# Patient Record
Sex: MALE | Race: WHITE | Employment: OTHER | URBAN - METROPOLITAN AREA
[De-identification: names, ages, dates, MRNs, and addresses within clinical notes are randomized per-mention and may not be internally consistent; named-entity substitution may affect disease eponyms.]

---

## 2017-01-01 ENCOUNTER — CARE COORDINATION (OUTPATIENT)
Dept: CARE COORDINATION | Facility: CLINIC | Age: 82
End: 2017-01-01

## 2017-01-01 ENCOUNTER — TELEPHONE (OUTPATIENT)
Dept: FAMILY MEDICINE | Facility: CLINIC | Age: 82
End: 2017-01-01

## 2017-01-01 ENCOUNTER — APPOINTMENT (OUTPATIENT)
Dept: CT IMAGING | Facility: CLINIC | Age: 82
DRG: 291 | End: 2017-01-01
Attending: EMERGENCY MEDICINE
Payer: MEDICARE

## 2017-01-01 ENCOUNTER — APPOINTMENT (OUTPATIENT)
Dept: PHYSICAL THERAPY | Facility: CLINIC | Age: 82
DRG: 699 | End: 2017-01-01
Payer: MEDICARE

## 2017-01-01 ENCOUNTER — APPOINTMENT (OUTPATIENT)
Dept: CARDIOLOGY | Facility: CLINIC | Age: 82
DRG: 291 | End: 2017-01-01
Attending: INTERNAL MEDICINE
Payer: MEDICARE

## 2017-01-01 ENCOUNTER — APPOINTMENT (OUTPATIENT)
Dept: GENERAL RADIOLOGY | Facility: CLINIC | Age: 82
DRG: 872 | End: 2017-01-01
Attending: EMERGENCY MEDICINE
Payer: MEDICARE

## 2017-01-01 ENCOUNTER — MEDICAL CORRESPONDENCE (OUTPATIENT)
Dept: HEALTH INFORMATION MANAGEMENT | Facility: CLINIC | Age: 82
End: 2017-01-01

## 2017-01-01 ENCOUNTER — OFFICE VISIT (OUTPATIENT)
Dept: FAMILY MEDICINE | Facility: CLINIC | Age: 82
End: 2017-01-01
Payer: MEDICARE

## 2017-01-01 ENCOUNTER — NURSING HOME VISIT (OUTPATIENT)
Dept: GERIATRICS | Facility: CLINIC | Age: 82
End: 2017-01-01
Payer: MEDICARE

## 2017-01-01 ENCOUNTER — TRANSFERRED RECORDS (OUTPATIENT)
Dept: HEALTH INFORMATION MANAGEMENT | Facility: CLINIC | Age: 82
End: 2017-01-01

## 2017-01-01 ENCOUNTER — APPOINTMENT (OUTPATIENT)
Dept: PHYSICAL THERAPY | Facility: CLINIC | Age: 82
DRG: 699 | End: 2017-01-01
Attending: INTERNAL MEDICINE
Payer: MEDICARE

## 2017-01-01 ENCOUNTER — HOSPITAL ENCOUNTER (INPATIENT)
Facility: CLINIC | Age: 82
LOS: 4 days | Discharge: HOSPICE | DRG: 291 | End: 2017-04-06
Attending: EMERGENCY MEDICINE | Admitting: INTERNAL MEDICINE
Payer: MEDICARE

## 2017-01-01 ENCOUNTER — DOCUMENTATION ONLY (OUTPATIENT)
Dept: CARE COORDINATION | Facility: CLINIC | Age: 82
End: 2017-01-01

## 2017-01-01 ENCOUNTER — APPOINTMENT (OUTPATIENT)
Dept: PHYSICAL THERAPY | Facility: CLINIC | Age: 82
DRG: 872 | End: 2017-01-01
Attending: INTERNAL MEDICINE
Payer: MEDICARE

## 2017-01-01 ENCOUNTER — APPOINTMENT (OUTPATIENT)
Dept: ULTRASOUND IMAGING | Facility: CLINIC | Age: 82
DRG: 291 | End: 2017-01-01
Attending: EMERGENCY MEDICINE
Payer: MEDICARE

## 2017-01-01 ENCOUNTER — HOSPITAL ENCOUNTER (INPATIENT)
Facility: CLINIC | Age: 82
LOS: 3 days | Discharge: NURSING, REHAB OR LONG-TERM CARE | DRG: 699 | End: 2017-01-05
Attending: EMERGENCY MEDICINE | Admitting: INTERNAL MEDICINE
Payer: MEDICARE

## 2017-01-01 ENCOUNTER — DISCHARGE SUMMARY NURSING HOME (OUTPATIENT)
Dept: GERIATRICS | Facility: CLINIC | Age: 82
End: 2017-01-01
Payer: MEDICARE

## 2017-01-01 ENCOUNTER — TELEPHONE (OUTPATIENT)
Dept: FAMILY MEDICINE | Facility: CLINIC | Age: 82
End: 2017-01-01
Payer: MEDICARE

## 2017-01-01 ENCOUNTER — APPOINTMENT (OUTPATIENT)
Dept: PHYSICAL THERAPY | Facility: CLINIC | Age: 82
DRG: 872 | End: 2017-01-01
Payer: MEDICARE

## 2017-01-01 ENCOUNTER — TELEPHONE (OUTPATIENT)
Dept: NURSING | Facility: CLINIC | Age: 82
End: 2017-01-01

## 2017-01-01 ENCOUNTER — APPOINTMENT (OUTPATIENT)
Dept: GENERAL RADIOLOGY | Facility: CLINIC | Age: 82
DRG: 291 | End: 2017-01-01
Attending: EMERGENCY MEDICINE
Payer: MEDICARE

## 2017-01-01 ENCOUNTER — HOSPITAL ENCOUNTER (INPATIENT)
Facility: CLINIC | Age: 82
LOS: 3 days | Discharge: HOME HEALTH | DRG: 872 | End: 2017-02-02
Attending: EMERGENCY MEDICINE | Admitting: INTERNAL MEDICINE
Payer: MEDICARE

## 2017-01-01 VITALS
HEART RATE: 83 BPM | DIASTOLIC BLOOD PRESSURE: 81 MMHG | RESPIRATION RATE: 16 BRPM | OXYGEN SATURATION: 95 % | WEIGHT: 153.4 LBS | SYSTOLIC BLOOD PRESSURE: 154 MMHG | TEMPERATURE: 98 F | BODY MASS INDEX: 25.53 KG/M2

## 2017-01-01 VITALS
DIASTOLIC BLOOD PRESSURE: 56 MMHG | BODY MASS INDEX: 25.77 KG/M2 | HEART RATE: 69 BPM | HEIGHT: 67 IN | SYSTOLIC BLOOD PRESSURE: 132 MMHG | RESPIRATION RATE: 18 BRPM | OXYGEN SATURATION: 95 % | TEMPERATURE: 98.5 F | WEIGHT: 164.2 LBS

## 2017-01-01 VITALS
BODY MASS INDEX: 24.09 KG/M2 | TEMPERATURE: 99.2 F | OXYGEN SATURATION: 91 % | RESPIRATION RATE: 18 BRPM | HEART RATE: 63 BPM | SYSTOLIC BLOOD PRESSURE: 151 MMHG | DIASTOLIC BLOOD PRESSURE: 95 MMHG | WEIGHT: 149.25 LBS

## 2017-01-01 VITALS
SYSTOLIC BLOOD PRESSURE: 142 MMHG | TEMPERATURE: 97.9 F | RESPIRATION RATE: 16 BRPM | HEART RATE: 69 BPM | BODY MASS INDEX: 27.01 KG/M2 | HEIGHT: 65 IN | DIASTOLIC BLOOD PRESSURE: 72 MMHG | WEIGHT: 162.1 LBS | OXYGEN SATURATION: 96 %

## 2017-01-01 VITALS
RESPIRATION RATE: 24 BRPM | HEART RATE: 72 BPM | OXYGEN SATURATION: 98 % | BODY MASS INDEX: 26.84 KG/M2 | SYSTOLIC BLOOD PRESSURE: 86 MMHG | HEIGHT: 65 IN | WEIGHT: 161.1 LBS | TEMPERATURE: 99.3 F | DIASTOLIC BLOOD PRESSURE: 51 MMHG

## 2017-01-01 VITALS
HEART RATE: 66 BPM | DIASTOLIC BLOOD PRESSURE: 66 MMHG | SYSTOLIC BLOOD PRESSURE: 126 MMHG | HEIGHT: 66 IN | WEIGHT: 156 LBS | RESPIRATION RATE: 14 BRPM | BODY MASS INDEX: 25.07 KG/M2 | TEMPERATURE: 97.6 F

## 2017-01-01 VITALS
SYSTOLIC BLOOD PRESSURE: 124 MMHG | BODY MASS INDEX: 26.49 KG/M2 | TEMPERATURE: 98 F | HEIGHT: 65 IN | RESPIRATION RATE: 18 BRPM | DIASTOLIC BLOOD PRESSURE: 78 MMHG | WEIGHT: 159 LBS | HEART RATE: 68 BPM

## 2017-01-01 VITALS
BODY MASS INDEX: 26.69 KG/M2 | RESPIRATION RATE: 16 BRPM | HEART RATE: 68 BPM | SYSTOLIC BLOOD PRESSURE: 114 MMHG | WEIGHT: 160.4 LBS | TEMPERATURE: 100.9 F | DIASTOLIC BLOOD PRESSURE: 57 MMHG | OXYGEN SATURATION: 99 %

## 2017-01-01 VITALS
HEIGHT: 65 IN | WEIGHT: 161.1 LBS | RESPIRATION RATE: 18 BRPM | DIASTOLIC BLOOD PRESSURE: 62 MMHG | TEMPERATURE: 98.7 F | BODY MASS INDEX: 26.84 KG/M2 | HEART RATE: 73 BPM | SYSTOLIC BLOOD PRESSURE: 122 MMHG | OXYGEN SATURATION: 97 %

## 2017-01-01 DIAGNOSIS — C67.9 MALIGNANT NEOPLASM OF URINARY BLADDER, UNSPECIFIED SITE (H): ICD-10-CM

## 2017-01-01 DIAGNOSIS — N39.0 URINARY TRACT INFECTION, SITE UNSPECIFIED: ICD-10-CM

## 2017-01-01 DIAGNOSIS — N39.0 URINARY TRACT INFECTION WITHOUT HEMATURIA, SITE UNSPECIFIED: Primary | ICD-10-CM

## 2017-01-01 DIAGNOSIS — F33.1 MODERATE EPISODE OF RECURRENT MAJOR DEPRESSIVE DISORDER (H): Primary | ICD-10-CM

## 2017-01-01 DIAGNOSIS — Z51.5 HOSPICE CARE PATIENT: Primary | ICD-10-CM

## 2017-01-01 DIAGNOSIS — I48.0 PAROXYSMAL A-FIB (H): ICD-10-CM

## 2017-01-01 DIAGNOSIS — M62.81 GENERALIZED MUSCLE WEAKNESS: ICD-10-CM

## 2017-01-01 DIAGNOSIS — I50.9 ACUTE CONGESTIVE HEART FAILURE, UNSPECIFIED CONGESTIVE HEART FAILURE TYPE: ICD-10-CM

## 2017-01-01 DIAGNOSIS — A41.9 SEVERE SEPSIS (H): ICD-10-CM

## 2017-01-01 DIAGNOSIS — R35.0 URINE FREQUENCY: Primary | ICD-10-CM

## 2017-01-01 DIAGNOSIS — I10 ESSENTIAL HYPERTENSION WITH GOAL BLOOD PRESSURE LESS THAN 140/90: ICD-10-CM

## 2017-01-01 DIAGNOSIS — N39.0 URINARY TRACT INFECTION DUE TO PROTEUS: ICD-10-CM

## 2017-01-01 DIAGNOSIS — R50.9 FEVER, UNSPECIFIED: ICD-10-CM

## 2017-01-01 DIAGNOSIS — D64.81 ANEMIA DUE TO ANTINEOPLASTIC CHEMOTHERAPY: ICD-10-CM

## 2017-01-01 DIAGNOSIS — D61.818 PANCYTOPENIA (H): ICD-10-CM

## 2017-01-01 DIAGNOSIS — D63.8 ANEMIA IN OTHER CHRONIC DISEASES CLASSIFIED ELSEWHERE: ICD-10-CM

## 2017-01-01 DIAGNOSIS — N19 RENAL FAILURE: ICD-10-CM

## 2017-01-01 DIAGNOSIS — R65.20 SEVERE SEPSIS (H): ICD-10-CM

## 2017-01-01 DIAGNOSIS — N39.0 URINARY TRACT INFECTION, SITE UNSPECIFIED: Primary | ICD-10-CM

## 2017-01-01 DIAGNOSIS — K92.1 MELENA: Primary | ICD-10-CM

## 2017-01-01 DIAGNOSIS — C67.8 MALIGNANT NEOPLASM OF OVERLAPPING SITES OF BLADDER (H): ICD-10-CM

## 2017-01-01 DIAGNOSIS — D63.8 ANEMIA IN OTHER CHRONIC DISEASES CLASSIFIED ELSEWHERE: Primary | ICD-10-CM

## 2017-01-01 DIAGNOSIS — D69.6 THROMBOCYTOPENIA (H): ICD-10-CM

## 2017-01-01 DIAGNOSIS — C67.8 MALIGNANT NEOPLASM OF OVERLAPPING SITES OF BLADDER (H): Primary | ICD-10-CM

## 2017-01-01 DIAGNOSIS — I63.9 CEREBROVASCULAR ACCIDENT (CVA), UNSPECIFIED MECHANISM (H): ICD-10-CM

## 2017-01-01 DIAGNOSIS — N18.30 CKD (CHRONIC KIDNEY DISEASE) STAGE 3, GFR 30-59 ML/MIN (H): ICD-10-CM

## 2017-01-01 DIAGNOSIS — Z85.51 PERSONAL HISTORY OF MALIGNANT NEOPLASM OF BLADDER: Primary | ICD-10-CM

## 2017-01-01 DIAGNOSIS — N30.90 BLADDER INFECTION: ICD-10-CM

## 2017-01-01 DIAGNOSIS — T45.1X5A ANEMIA DUE TO ANTINEOPLASTIC CHEMOTHERAPY: ICD-10-CM

## 2017-01-01 DIAGNOSIS — Z53.9 DIAGNOSIS NOT YET DEFINED: Primary | ICD-10-CM

## 2017-01-01 DIAGNOSIS — N39.0 URINARY TRACT INFECTION WITHOUT HEMATURIA, SITE UNSPECIFIED: ICD-10-CM

## 2017-01-01 DIAGNOSIS — F33.1 MAJOR DEPRESSIVE DISORDER, RECURRENT EPISODE, MODERATE (H): Primary | ICD-10-CM

## 2017-01-01 DIAGNOSIS — D50.0 IRON DEFICIENCY ANEMIA DUE TO CHRONIC BLOOD LOSS: Primary | ICD-10-CM

## 2017-01-01 DIAGNOSIS — Z53.9 ERRONEOUS ENCOUNTER--DISREGARD: Primary | ICD-10-CM

## 2017-01-01 DIAGNOSIS — I48.91 ATRIAL FIBRILLATION WITH RVR (H): ICD-10-CM

## 2017-01-01 DIAGNOSIS — K59.09 CHRONIC CONSTIPATION: ICD-10-CM

## 2017-01-01 DIAGNOSIS — I73.9 PVD (PERIPHERAL VASCULAR DISEASE) (H): ICD-10-CM

## 2017-01-01 DIAGNOSIS — Z86.19 H/O SEPSIS: Primary | ICD-10-CM

## 2017-01-01 DIAGNOSIS — R50.9 ELEVATED TEMPERATURE: ICD-10-CM

## 2017-01-01 DIAGNOSIS — B96.4 URINARY TRACT INFECTION DUE TO PROTEUS: ICD-10-CM

## 2017-01-01 DIAGNOSIS — E11.00 TYPE 2 DIABETES MELLITUS WITH HYPEROSMOLARITY WITHOUT COMA, WITHOUT LONG-TERM CURRENT USE OF INSULIN (H): Primary | ICD-10-CM

## 2017-01-01 DIAGNOSIS — D64.9 ANEMIA, UNSPECIFIED TYPE: ICD-10-CM

## 2017-01-01 DIAGNOSIS — R79.89 ELEVATED LIVER FUNCTION TESTS: ICD-10-CM

## 2017-01-01 DIAGNOSIS — K59.00 CONSTIPATION, UNSPECIFIED CONSTIPATION TYPE: ICD-10-CM

## 2017-01-01 LAB
ABO + RH BLD: NORMAL
ALBUMIN SERPL-MCNC: 2.2 G/DL (ref 3.4–5)
ALBUMIN SERPL-MCNC: 2.4 G/DL (ref 3.4–5)
ALBUMIN SERPL-MCNC: 2.7 G/DL (ref 3.4–5)
ALBUMIN UR-MCNC: 100 MG/DL
ALBUMIN UR-MCNC: >=300 MG/DL
ALBUMIN UR-MCNC: >=300 MG/DL
ALP SERPL-CCNC: 108 U/L (ref 40–150)
ALP SERPL-CCNC: 78 U/L (ref 40–150)
ALP SERPL-CCNC: 97 U/L (ref 40–150)
ALT SERPL W P-5'-P-CCNC: 334 U/L (ref 0–70)
ALT SERPL W P-5'-P-CCNC: 381 U/L (ref 0–70)
ALT SERPL W P-5'-P-CCNC: 39 U/L (ref 0–70)
ANION GAP SERPL CALCULATED.3IONS-SCNC: 10 MMOL/L (ref 3–14)
ANION GAP SERPL CALCULATED.3IONS-SCNC: 10 MMOL/L (ref 3–14)
ANION GAP SERPL CALCULATED.3IONS-SCNC: 11 MMOL/L (ref 3–14)
ANION GAP SERPL CALCULATED.3IONS-SCNC: 11 MMOL/L (ref 3–14)
ANION GAP SERPL CALCULATED.3IONS-SCNC: 7 MMOL/L (ref 3–14)
ANION GAP SERPL CALCULATED.3IONS-SCNC: 8 MMOL/L (ref 3–14)
ANION GAP SERPL CALCULATED.3IONS-SCNC: 8 MMOL/L (ref 3–14)
ANION GAP SERPL CALCULATED.3IONS-SCNC: 9 MMOL/L (ref 3–14)
ANION GAP SERPL CALCULATED.3IONS-SCNC: 9 MMOL/L (ref 3–14)
APPEARANCE UR: ABNORMAL
APPEARANCE UR: ABNORMAL
APPEARANCE UR: CLEAR
AST SERPL W P-5'-P-CCNC: 27 U/L (ref 0–45)
AST SERPL W P-5'-P-CCNC: 508 U/L (ref 0–45)
AST SERPL W P-5'-P-CCNC: 940 U/L (ref 0–45)
BACTERIA #/AREA URNS HPF: ABNORMAL /HPF
BACTERIA SPEC CULT: ABNORMAL
BACTERIA SPEC CULT: ABNORMAL
BACTERIA SPEC CULT: NO GROWTH
BASOPHILS # BLD AUTO: 0 10E9/L (ref 0–0.2)
BASOPHILS NFR BLD AUTO: 0 %
BASOPHILS NFR BLD AUTO: 0.1 %
BASOPHILS NFR BLD AUTO: 0.2 %
BILIRUB DIRECT SERPL-MCNC: 0.2 MG/DL (ref 0–0.2)
BILIRUB SERPL-MCNC: 0.4 MG/DL (ref 0.2–1.3)
BILIRUB SERPL-MCNC: 0.5 MG/DL (ref 0.2–1.3)
BILIRUB SERPL-MCNC: 0.5 MG/DL (ref 0.2–1.3)
BILIRUB UR QL STRIP: NEGATIVE
BLD GP AB SCN SERPL QL: NORMAL
BLD PROD TYP BPU: NORMAL
BLD PROD TYP BPU: NORMAL
BLD UNIT ID BPU: 0
BLOOD BANK CMNT PATIENT-IMP: NORMAL
BLOOD PRODUCT CODE: NORMAL
BPU ID: NORMAL
BUN SERPL-MCNC: 17 MG/DL (ref 7–30)
BUN SERPL-MCNC: 23 MG/DL (ref 7–30)
BUN SERPL-MCNC: 23 MG/DL (ref 9–26)
BUN SERPL-MCNC: 24 MG/DL (ref 7–30)
BUN SERPL-MCNC: 29 MG/DL (ref 7–30)
BUN SERPL-MCNC: 30 MG/DL (ref 7–30)
BUN SERPL-MCNC: 32 MG/DL (ref 7–30)
BUN SERPL-MCNC: 33 MG/DL (ref 7–30)
C DIFF TOX B STL QL: NORMAL
CALCIUM SERPL-MCNC: 8 MG/DL (ref 8.5–10.1)
CALCIUM SERPL-MCNC: 8.3 MG/DL (ref 8.5–10.1)
CALCIUM SERPL-MCNC: 8.5 MG/DL (ref 8.5–10.1)
CALCIUM SERPL-MCNC: 8.8 MG/DL (ref 8.5–10.1)
CALCIUM SERPL-MCNC: 8.8 MG/DL (ref 8.5–10.1)
CALCIUM SERPL-MCNC: 8.9 MG/DL (ref 8.5–10.1)
CALCIUM SERPL-MCNC: 9.1 MG/DL (ref 8.4–10.2)
CALCIUM SERPL-MCNC: 9.1 MG/DL (ref 8.5–10.1)
CHLORIDE SERPL-SCNC: 100 MMOL/L (ref 94–109)
CHLORIDE SERPL-SCNC: 101 MMOL/L (ref 94–109)
CHLORIDE SERPL-SCNC: 104 MMOL/L (ref 94–109)
CHLORIDE SERPL-SCNC: 107 MMOL/L (ref 94–109)
CHLORIDE SERPL-SCNC: 107 MMOL/L (ref 94–109)
CHLORIDE SERPL-SCNC: 108 MMOL/L (ref 94–109)
CHLORIDE SERPL-SCNC: 109 MMOL/L (ref 94–109)
CHLORIDE SERPLBLD-SCNC: 105 MMOL/L (ref 98–109)
CK SERPL-CCNC: 31 U/L (ref 30–300)
CO2 BLDCOV-SCNC: 24 MMOL/L (ref 21–28)
CO2 SERPL-SCNC: 23 MMOL/L (ref 20–32)
CO2 SERPL-SCNC: 23 MMOL/L (ref 20–32)
CO2 SERPL-SCNC: 24 MMOL/L (ref 20–32)
CO2 SERPL-SCNC: 25 MMOL/L (ref 20–32)
CO2 SERPL-SCNC: 28 MMOL/L (ref 20–32)
CO2 SERPL-SCNC: 29 MMOL/L (ref 22–31)
CO2 SERPL-SCNC: 30 MMOL/L (ref 20–32)
CO2 SERPL-SCNC: 32 MMOL/L (ref 20–32)
COLOR UR AUTO: YELLOW
CREAT SERPL-MCNC: 1.18 MG/DL (ref 0.66–1.25)
CREAT SERPL-MCNC: 1.31 MG/DL (ref 0.66–1.25)
CREAT SERPL-MCNC: 1.37 MG/DL (ref 0.66–1.25)
CREAT SERPL-MCNC: 1.41 MG/DL (ref 0.73–1.18)
CREAT SERPL-MCNC: 1.45 MG/DL (ref 0.66–1.25)
CREAT SERPL-MCNC: 1.49 MG/DL (ref 0.66–1.25)
CREAT SERPL-MCNC: 1.56 MG/DL (ref 0.66–1.25)
CREAT SERPL-MCNC: 1.68 MG/DL (ref 0.66–1.25)
CREAT SERPL-MCNC: 1.94 MG/DL (ref 0.66–1.25)
CREAT SERPL-MCNC: 1.99 MG/DL (ref 0.66–1.25)
CREAT SERPL-MCNC: 2.05 MG/DL (ref 0.66–1.25)
DIFFERENTIAL METHOD BLD: ABNORMAL
EOSINOPHIL # BLD AUTO: 0 10E9/L (ref 0–0.7)
EOSINOPHIL # BLD AUTO: 0.1 10E9/L (ref 0–0.7)
EOSINOPHIL # BLD AUTO: 0.3 10E9/L (ref 0–0.7)
EOSINOPHIL # BLD AUTO: 0.4 10E9/L (ref 0–0.7)
EOSINOPHIL # BLD AUTO: 0.5 10E9/L (ref 0–0.7)
EOSINOPHIL NFR BLD AUTO: 0 %
EOSINOPHIL NFR BLD AUTO: 1.1 %
EOSINOPHIL NFR BLD AUTO: 2.5 %
EOSINOPHIL NFR BLD AUTO: 4.3 %
EOSINOPHIL NFR BLD AUTO: 5.5 %
ERYTHROCYTE [DISTWIDTH] IN BLOOD BY AUTOMATED COUNT: 14.5 % (ref 10–15)
ERYTHROCYTE [DISTWIDTH] IN BLOOD BY AUTOMATED COUNT: 14.5 % (ref 10–15)
ERYTHROCYTE [DISTWIDTH] IN BLOOD BY AUTOMATED COUNT: 14.8 % (ref 11–15)
ERYTHROCYTE [DISTWIDTH] IN BLOOD BY AUTOMATED COUNT: 15.3 % (ref 10–15)
ERYTHROCYTE [DISTWIDTH] IN BLOOD BY AUTOMATED COUNT: 15.5 % (ref 10–15)
ERYTHROCYTE [DISTWIDTH] IN BLOOD BY AUTOMATED COUNT: 16.2 % (ref 10–15)
ERYTHROCYTE [DISTWIDTH] IN BLOOD BY AUTOMATED COUNT: 16.3 % (ref 10–15)
ERYTHROCYTE [DISTWIDTH] IN BLOOD BY AUTOMATED COUNT: 16.3 % (ref 10–15)
FERRITIN SERPL-MCNC: 1348 NG/ML (ref 26–388)
FERRITIN SERPL-MCNC: 532 NG/ML (ref 26–388)
FLUAV+FLUBV AG SPEC QL: NEGATIVE
FLUAV+FLUBV AG SPEC QL: NORMAL
FOLATE SERPL-MCNC: 35.9 NG/ML
GFR SERPL CREATININE-BSD FRML MDRD: 31 ML/MIN/1.7M2
GFR SERPL CREATININE-BSD FRML MDRD: 32 ML/MIN/1.7M2
GFR SERPL CREATININE-BSD FRML MDRD: 33 ML/MIN/1.7M2
GFR SERPL CREATININE-BSD FRML MDRD: 39 ML/MIN/1.7M2
GFR SERPL CREATININE-BSD FRML MDRD: 43 ML/MIN/1.7M2
GFR SERPL CREATININE-BSD FRML MDRD: 45 ML/MIN/1.7M2
GFR SERPL CREATININE-BSD FRML MDRD: 46 ML/MIN/1.73M2
GFR SERPL CREATININE-BSD FRML MDRD: 46 ML/MIN/1.7M2
GFR SERPL CREATININE-BSD FRML MDRD: 50 ML/MIN/1.7M2
GFR SERPL CREATININE-BSD FRML MDRD: 52 ML/MIN/1.7M2
GFR SERPL CREATININE-BSD FRML MDRD: 59 ML/MIN/1.7M2
GLUCOSE BLDC GLUCOMTR-MCNC: 118 MG/DL (ref 70–99)
GLUCOSE BLDC GLUCOMTR-MCNC: 120 MG/DL (ref 70–99)
GLUCOSE BLDC GLUCOMTR-MCNC: 120 MG/DL (ref 70–99)
GLUCOSE BLDC GLUCOMTR-MCNC: 121 MG/DL (ref 70–99)
GLUCOSE BLDC GLUCOMTR-MCNC: 126 MG/DL (ref 70–99)
GLUCOSE BLDC GLUCOMTR-MCNC: 129 MG/DL (ref 70–99)
GLUCOSE BLDC GLUCOMTR-MCNC: 140 MG/DL (ref 70–99)
GLUCOSE BLDC GLUCOMTR-MCNC: 140 MG/DL (ref 70–99)
GLUCOSE BLDC GLUCOMTR-MCNC: 155 MG/DL (ref 70–99)
GLUCOSE BLDC GLUCOMTR-MCNC: 165 MG/DL (ref 70–99)
GLUCOSE BLDC GLUCOMTR-MCNC: 167 MG/DL (ref 70–99)
GLUCOSE BLDC GLUCOMTR-MCNC: 168 MG/DL (ref 70–99)
GLUCOSE SERPL-MCNC: 109 MG/DL (ref 70–99)
GLUCOSE SERPL-MCNC: 117 MG/DL (ref 70–99)
GLUCOSE SERPL-MCNC: 118 MG/DL (ref 70–99)
GLUCOSE SERPL-MCNC: 119 MG/DL (ref 70–100)
GLUCOSE SERPL-MCNC: 120 MG/DL (ref 70–99)
GLUCOSE SERPL-MCNC: 142 MG/DL (ref 70–99)
GLUCOSE SERPL-MCNC: 192 MG/DL (ref 70–99)
GLUCOSE SERPL-MCNC: 200 MG/DL (ref 70–99)
GLUCOSE SERPL-MCNC: 237 MG/DL (ref 70–99)
GLUCOSE SERPL-MCNC: 98 MG/DL (ref 70–99)
GLUCOSE UR STRIP-MCNC: NEGATIVE MG/DL
HBA1C MFR BLD: 6.8 % (ref 4.3–6)
HCT VFR BLD AUTO: 23.9 % (ref 40–53)
HCT VFR BLD AUTO: 24.6 % (ref 40–53)
HCT VFR BLD AUTO: 26.9 % (ref 40–53)
HCT VFR BLD AUTO: 26.9 % (ref 40–53)
HCT VFR BLD AUTO: 27 % (ref 40–53)
HCT VFR BLD AUTO: 27.4 % (ref 40–53)
HCT VFR BLD AUTO: 28.4 % (ref 40–53)
HCT VFR BLD AUTO: 28.5 % (ref 40–53)
HCT VFR BLD AUTO: 29.6 % (ref 39–51)
HCT VFR BLD AUTO: 30.4 % (ref 40–53)
HEMOGLOBIN: 9.1 G/DL (ref 13.4–17.5)
HGB BLD-MCNC: 7.4 G/DL (ref 13.3–17.7)
HGB BLD-MCNC: 7.5 G/DL (ref 13.3–17.7)
HGB BLD-MCNC: 7.6 G/DL (ref 13.3–17.7)
HGB BLD-MCNC: 7.8 G/DL (ref 13.3–17.7)
HGB BLD-MCNC: 8.7 G/DL (ref 13.3–17.7)
HGB BLD-MCNC: 8.8 G/DL (ref 13.3–17.7)
HGB BLD-MCNC: 8.8 G/DL (ref 13.3–17.7)
HGB BLD-MCNC: 9 G/DL (ref 13.3–17.7)
HGB BLD-MCNC: 9.1 G/DL (ref 13.3–17.7)
HGB BLD-MCNC: 9.3 G/DL (ref 13.3–17.7)
HGB BLD-MCNC: 9.8 G/DL (ref 13.3–17.7)
HGB UR QL STRIP: ABNORMAL
IMM GRANULOCYTES # BLD: 0 10E9/L (ref 0–0.4)
IMM GRANULOCYTES NFR BLD: 0.2 %
IMM GRANULOCYTES NFR BLD: 0.3 %
IMM GRANULOCYTES NFR BLD: 0.5 %
IRON SATN MFR SERPL: 16 % (ref 15–46)
IRON SATN MFR SERPL: 8 % (ref 15–46)
IRON SERPL-MCNC: 18 UG/DL (ref 35–180)
IRON SERPL-MCNC: 26 UG/DL (ref 35–180)
KETONES UR STRIP-MCNC: NEGATIVE MG/DL
LACTATE BLD-SCNC: 0.9 MMOL/L (ref 0.7–2.1)
LACTATE BLD-SCNC: 2 MMOL/L (ref 0.7–2.1)
LACTATE BLD-SCNC: 2.3 MMOL/L (ref 0.7–2.1)
LACTATE SERPL-SCNC: 2.6 MMOL/L (ref 0.4–2)
LEUKOCYTE ESTERASE UR QL STRIP: ABNORMAL
LYMPHOCYTES # BLD AUTO: 1 10E9/L (ref 0.8–5.3)
LYMPHOCYTES # BLD AUTO: 1.5 10E9/L (ref 0.8–5.3)
LYMPHOCYTES # BLD AUTO: 1.8 10E9/L (ref 0.8–5.3)
LYMPHOCYTES # BLD AUTO: 1.9 10E9/L (ref 0.8–5.3)
LYMPHOCYTES # BLD AUTO: 2.1 10E9/L (ref 0.8–5.3)
LYMPHOCYTES NFR BLD AUTO: 17.2 %
LYMPHOCYTES NFR BLD AUTO: 18.2 %
LYMPHOCYTES NFR BLD AUTO: 18.7 %
LYMPHOCYTES NFR BLD AUTO: 22.6 %
LYMPHOCYTES NFR BLD AUTO: 9.5 %
Lab: ABNORMAL
Lab: ABNORMAL
Lab: NORMAL
Lab: NORMAL
MCH RBC QN AUTO: 29.8 PG (ref 26.5–33)
MCH RBC QN AUTO: 30.2 PG (ref 26.5–33)
MCH RBC QN AUTO: 30.6 PG (ref 26.5–33)
MCH RBC QN AUTO: 30.7 PG (ref 26.5–33)
MCH RBC QN AUTO: 31 PG (ref 26.5–33)
MCH RBC QN AUTO: 31.2 PG (ref 26.5–33)
MCH RBC QN AUTO: 31.5 PG (ref 26.5–33)
MCHC RBC AUTO-ENTMCNC: 30.5 G/DL (ref 31.5–36.5)
MCHC RBC AUTO-ENTMCNC: 31 G/DL (ref 31.5–36.5)
MCHC RBC AUTO-ENTMCNC: 31.9 G/DL (ref 31.5–36.5)
MCHC RBC AUTO-ENTMCNC: 32.2 G/DL (ref 31.5–36.5)
MCHC RBC AUTO-ENTMCNC: 32.2 G/DL (ref 31.5–36.5)
MCHC RBC AUTO-ENTMCNC: 32.7 G/DL (ref 31.5–36.5)
MCHC RBC AUTO-ENTMCNC: 32.7 G/DL (ref 31.5–36.5)
MCV RBC AUTO: 95 FL (ref 78–100)
MCV RBC AUTO: 95 FL (ref 78–100)
MCV RBC AUTO: 96 FL (ref 78–100)
MCV RBC AUTO: 97.7 FL (ref 80–100)
MCV RBC AUTO: 98 FL (ref 78–100)
MCV RBC AUTO: 98 FL (ref 78–100)
MICRO REPORT STATUS: ABNORMAL
MICRO REPORT STATUS: ABNORMAL
MICRO REPORT STATUS: NORMAL
MICROORGANISM SPEC CULT: ABNORMAL
MICROORGANISM SPEC CULT: ABNORMAL
MONOCYTES # BLD AUTO: 0.5 10E9/L (ref 0–1.3)
MONOCYTES # BLD AUTO: 0.7 10E9/L (ref 0–1.3)
MONOCYTES # BLD AUTO: 0.7 10E9/L (ref 0–1.3)
MONOCYTES # BLD AUTO: 0.8 10E9/L (ref 0–1.3)
MONOCYTES # BLD AUTO: 0.9 10E9/L (ref 0–1.3)
MONOCYTES NFR BLD AUTO: 5.7 %
MONOCYTES NFR BLD AUTO: 7.1 %
MONOCYTES NFR BLD AUTO: 7.5 %
MONOCYTES NFR BLD AUTO: 7.5 %
MONOCYTES NFR BLD AUTO: 8.5 %
NEUTROPHILS # BLD AUTO: 5.5 10E9/L (ref 1.6–8.3)
NEUTROPHILS # BLD AUTO: 6.5 10E9/L (ref 1.6–8.3)
NEUTROPHILS # BLD AUTO: 7.1 10E9/L (ref 1.6–8.3)
NEUTROPHILS # BLD AUTO: 7.7 10E9/L (ref 1.6–8.3)
NEUTROPHILS # BLD AUTO: 8.7 10E9/L (ref 1.6–8.3)
NEUTROPHILS NFR BLD AUTO: 68.8 %
NEUTROPHILS NFR BLD AUTO: 69.5 %
NEUTROPHILS NFR BLD AUTO: 69.6 %
NEUTROPHILS NFR BLD AUTO: 71.4 %
NEUTROPHILS NFR BLD AUTO: 82.7 %
NITRATE UR QL: NEGATIVE
NITRATE UR QL: NEGATIVE
NITRATE UR QL: POSITIVE
NON-SQ EPI CELLS #/AREA URNS LPF: ABNORMAL /LPF
NRBC # BLD AUTO: 0 10*3/UL
NRBC BLD AUTO-RTO: 0 /100
NT-PROBNP SERPL-MCNC: ABNORMAL PG/ML (ref 0–1800)
NUM BPU REQUESTED: 1
PCO2 BLDV: 41 MM HG (ref 40–50)
PH BLDV: 7.38 PH (ref 7.32–7.43)
PH UR STRIP: 5.5 PH (ref 5–7)
PH UR STRIP: 8.5 PH (ref 5–7)
PH UR STRIP: >=9 PH (ref 5–7)
PLATELET # BLD AUTO: 214 10E9/L (ref 150–450)
PLATELET # BLD AUTO: 258 10E9/L (ref 150–450)
PLATELET # BLD AUTO: 279 10E9/L (ref 150–450)
PLATELET # BLD AUTO: 286 10E9/L (ref 150–450)
PLATELET # BLD AUTO: 299 K/CMM (ref 140–450)
PLATELET # BLD AUTO: 313 10E9/L (ref 150–450)
PLATELET # BLD AUTO: 337 10E9/L (ref 150–450)
PLATELET # BLD AUTO: 358 10E9/L (ref 150–450)
PO2 BLDV: 30 MM HG (ref 25–47)
POTASSIUM SERPL-SCNC: 3.5 MMOL/L (ref 3.4–5.3)
POTASSIUM SERPL-SCNC: 3.5 MMOL/L (ref 3.4–5.3)
POTASSIUM SERPL-SCNC: 3.6 MMOL/L (ref 3.4–5.3)
POTASSIUM SERPL-SCNC: 3.7 MMOL/L (ref 3.4–5.3)
POTASSIUM SERPL-SCNC: 3.8 MMOL/L (ref 3.4–5.3)
POTASSIUM SERPL-SCNC: 4.2 MMOL/L (ref 3.5–5.2)
POTASSIUM SERPL-SCNC: 4.7 MMOL/L (ref 3.4–5.3)
PROT SERPL-MCNC: 6.3 G/DL (ref 6.8–8.8)
PROT SERPL-MCNC: 7.1 G/DL (ref 6.8–8.8)
PROT SERPL-MCNC: 7.1 G/DL (ref 6.8–8.8)
RBC # BLD AUTO: 2.45 10E12/L (ref 4.4–5.9)
RBC # BLD AUTO: 2.52 10E12/L (ref 4.4–5.9)
RBC # BLD AUTO: 2.81 10E12/L (ref 4.4–5.9)
RBC # BLD AUTO: 2.82 10E12/L (ref 4.4–5.9)
RBC # BLD AUTO: 2.95 10E12/L (ref 4.4–5.9)
RBC # BLD AUTO: 2.96 10E12/L (ref 4.4–5.9)
RBC # BLD AUTO: 3.03 M/CMM (ref 4.2–5.9)
RBC # BLD AUTO: 3.2 10E12/L (ref 4.4–5.9)
RBC #/AREA URNS AUTO: ABNORMAL /HPF (ref 0–2)
SAO2 % BLDV FROM PO2: 56 %
SODIUM SERPL-SCNC: 139 MMOL/L (ref 133–144)
SODIUM SERPL-SCNC: 140 MMOL/L (ref 133–144)
SODIUM SERPL-SCNC: 140 MMOL/L (ref 133–144)
SODIUM SERPL-SCNC: 142 MMOL/L (ref 133–144)
SODIUM SERPL-SCNC: 142 MMOL/L (ref 136–145)
SP GR UR STRIP: 1.01 (ref 1–1.03)
SP GR UR STRIP: 1.01 (ref 1–1.03)
SP GR UR STRIP: 1.02 (ref 1–1.03)
SPECIMEN EXP DATE BLD: NORMAL
SPECIMEN SOURCE: ABNORMAL
SPECIMEN SOURCE: ABNORMAL
SPECIMEN SOURCE: NORMAL
TIBC SERPL-MCNC: 160 UG/DL (ref 240–430)
TIBC SERPL-MCNC: 228 UG/DL (ref 240–430)
TRANSFUSION STATUS PATIENT QL: NORMAL
TRANSFUSION STATUS PATIENT QL: NORMAL
TRI-PHOS CRY #/AREA URNS HPF: ABNORMAL /HPF
TROPONIN I SERPL-MCNC: 0.04 UG/L (ref 0–0.04)
TSH SERPL DL<=0.005 MIU/L-ACNC: 0.81 MU/L (ref 0.4–4)
URN SPEC COLLECT METH UR: ABNORMAL
UROBILINOGEN UR STRIP-ACNC: 0.2 EU/DL (ref 0.2–1)
VIT B12 SERPL-MCNC: 384 PG/ML (ref 193–986)
WBC # BLD AUTO: 10.5 10E9/L (ref 4–11)
WBC # BLD AUTO: 10.5 10E9/L (ref 4–11)
WBC # BLD AUTO: 11 10E9/L (ref 4–11)
WBC # BLD AUTO: 7.9 10E9/L (ref 4–11)
WBC # BLD AUTO: 8.6 K/CMM (ref 3.8–11)
WBC # BLD AUTO: 9.4 10E9/L (ref 4–11)
WBC # BLD AUTO: 9.9 10E9/L (ref 4–11)
WBC # BLD AUTO: 9.9 10E9/L (ref 4–11)
WBC #/AREA URNS AUTO: >100 /HPF (ref 0–2)
WBC #/AREA URNS AUTO: ABNORMAL /HPF (ref 0–2)
WBC #/AREA URNS AUTO: ABNORMAL /HPF (ref 0–2)

## 2017-01-01 PROCEDURE — 25000125 ZZHC RX 250: Performed by: EMERGENCY MEDICINE

## 2017-01-01 PROCEDURE — 99310 SBSQ NF CARE HIGH MDM 45: CPT | Performed by: NURSE PRACTITIONER

## 2017-01-01 PROCEDURE — 84484 ASSAY OF TROPONIN QUANT: CPT | Performed by: EMERGENCY MEDICINE

## 2017-01-01 PROCEDURE — 83605 ASSAY OF LACTIC ACID: CPT | Performed by: EMERGENCY MEDICINE

## 2017-01-01 PROCEDURE — 87186 SC STD MICRODIL/AGAR DIL: CPT | Performed by: EMERGENCY MEDICINE

## 2017-01-01 PROCEDURE — 40000894 ZZH STATISTIC OT IP EVAL DEFER

## 2017-01-01 PROCEDURE — A9270 NON-COVERED ITEM OR SERVICE: HCPCS | Mod: GY | Performed by: INTERNAL MEDICINE

## 2017-01-01 PROCEDURE — 87088 URINE BACTERIA CULTURE: CPT | Performed by: EMERGENCY MEDICINE

## 2017-01-01 PROCEDURE — 25000128 H RX IP 250 OP 636: Performed by: INTERNAL MEDICINE

## 2017-01-01 PROCEDURE — 80048 BASIC METABOLIC PNL TOTAL CA: CPT | Performed by: INTERNAL MEDICINE

## 2017-01-01 PROCEDURE — 99233 SBSQ HOSP IP/OBS HIGH 50: CPT | Performed by: INTERNAL MEDICINE

## 2017-01-01 PROCEDURE — 99232 SBSQ HOSP IP/OBS MODERATE 35: CPT | Performed by: HOSPITALIST

## 2017-01-01 PROCEDURE — A9270 NON-COVERED ITEM OR SERVICE: HCPCS | Mod: GY | Performed by: HOSPITALIST

## 2017-01-01 PROCEDURE — 25000132 ZZH RX MED GY IP 250 OP 250 PS 637: Mod: GY | Performed by: EMERGENCY MEDICINE

## 2017-01-01 PROCEDURE — 97116 GAIT TRAINING THERAPY: CPT | Mod: GP | Performed by: PHYSICAL THERAPIST

## 2017-01-01 PROCEDURE — 21000001 ZZH R&B HEART CARE

## 2017-01-01 PROCEDURE — 99285 EMERGENCY DEPT VISIT HI MDM: CPT

## 2017-01-01 PROCEDURE — 86850 RBC ANTIBODY SCREEN: CPT | Performed by: EMERGENCY MEDICINE

## 2017-01-01 PROCEDURE — 25000132 ZZH RX MED GY IP 250 OP 250 PS 637: Mod: GY | Performed by: INTERNAL MEDICINE

## 2017-01-01 PROCEDURE — 83605 ASSAY OF LACTIC ACID: CPT

## 2017-01-01 PROCEDURE — 86850 RBC ANTIBODY SCREEN: CPT | Performed by: HOSPITALIST

## 2017-01-01 PROCEDURE — 83880 ASSAY OF NATRIURETIC PEPTIDE: CPT | Performed by: EMERGENCY MEDICINE

## 2017-01-01 PROCEDURE — 86901 BLOOD TYPING SEROLOGIC RH(D): CPT | Performed by: EMERGENCY MEDICINE

## 2017-01-01 PROCEDURE — 36415 COLL VENOUS BLD VENIPUNCTURE: CPT | Performed by: HOSPITALIST

## 2017-01-01 PROCEDURE — 99207 ZZC CDG-MDM COMPONENT: MEETS HIGH - UP CODED: CPT | Performed by: INTERNAL MEDICINE

## 2017-01-01 PROCEDURE — 86923 COMPATIBILITY TEST ELECTRIC: CPT | Performed by: INTERNAL MEDICINE

## 2017-01-01 PROCEDURE — 96365 THER/PROPH/DIAG IV INF INIT: CPT

## 2017-01-01 PROCEDURE — 97530 THERAPEUTIC ACTIVITIES: CPT | Mod: GP | Performed by: PHYSICAL THERAPIST

## 2017-01-01 PROCEDURE — 99316 NF DSCHRG MGMT 30 MIN+: CPT | Performed by: NURSE PRACTITIONER

## 2017-01-01 PROCEDURE — 36415 COLL VENOUS BLD VENIPUNCTURE: CPT | Performed by: INTERNAL MEDICINE

## 2017-01-01 PROCEDURE — 99239 HOSP IP/OBS DSCHRG MGMT >30: CPT | Performed by: HOSPITALIST

## 2017-01-01 PROCEDURE — 85018 HEMOGLOBIN: CPT | Performed by: HOSPITALIST

## 2017-01-01 PROCEDURE — 12000000 ZZH R&B MED SURG/OB

## 2017-01-01 PROCEDURE — 99223 1ST HOSP IP/OBS HIGH 75: CPT | Mod: AI | Performed by: INTERNAL MEDICINE

## 2017-01-01 PROCEDURE — 82803 BLOOD GASES ANY COMBINATION: CPT

## 2017-01-01 PROCEDURE — 00000146 ZZHCL STATISTIC GLUCOSE BY METER IP

## 2017-01-01 PROCEDURE — 87086 URINE CULTURE/COLONY COUNT: CPT | Performed by: FAMILY MEDICINE

## 2017-01-01 PROCEDURE — A9270 NON-COVERED ITEM OR SERVICE: HCPCS | Mod: GY | Performed by: EMERGENCY MEDICINE

## 2017-01-01 PROCEDURE — 25000132 ZZH RX MED GY IP 250 OP 250 PS 637: Mod: GY | Performed by: HOSPITALIST

## 2017-01-01 PROCEDURE — 71020 XR CHEST 2 VW: CPT

## 2017-01-01 PROCEDURE — 85018 HEMOGLOBIN: CPT | Performed by: INTERNAL MEDICINE

## 2017-01-01 PROCEDURE — 25000125 ZZHC RX 250: Performed by: INTERNAL MEDICINE

## 2017-01-01 PROCEDURE — 99207 ZZC CDG-CORRECTLY CODED, REVIEWED AND AGREE: CPT | Performed by: NURSE PRACTITIONER

## 2017-01-01 PROCEDURE — 82728 ASSAY OF FERRITIN: CPT | Performed by: INTERNAL MEDICINE

## 2017-01-01 PROCEDURE — 80048 BASIC METABOLIC PNL TOTAL CA: CPT | Performed by: EMERGENCY MEDICINE

## 2017-01-01 PROCEDURE — 85025 COMPLETE CBC W/AUTO DIFF WBC: CPT | Performed by: EMERGENCY MEDICINE

## 2017-01-01 PROCEDURE — 76705 ECHO EXAM OF ABDOMEN: CPT

## 2017-01-01 PROCEDURE — 81001 URINALYSIS AUTO W/SCOPE: CPT | Performed by: EMERGENCY MEDICINE

## 2017-01-01 PROCEDURE — 87086 URINE CULTURE/COLONY COUNT: CPT | Performed by: EMERGENCY MEDICINE

## 2017-01-01 PROCEDURE — 83605 ASSAY OF LACTIC ACID: CPT | Performed by: INTERNAL MEDICINE

## 2017-01-01 PROCEDURE — 86900 BLOOD TYPING SEROLOGIC ABO: CPT | Performed by: EMERGENCY MEDICINE

## 2017-01-01 PROCEDURE — 99232 SBSQ HOSP IP/OBS MODERATE 35: CPT | Performed by: INTERNAL MEDICINE

## 2017-01-01 PROCEDURE — 25500064 ZZH RX 255 OP 636: Performed by: INTERNAL MEDICINE

## 2017-01-01 PROCEDURE — 83540 ASSAY OF IRON: CPT | Performed by: INTERNAL MEDICINE

## 2017-01-01 PROCEDURE — G0180 MD CERTIFICATION HHA PATIENT: HCPCS | Performed by: FAMILY MEDICINE

## 2017-01-01 PROCEDURE — 40000264 ECHO COMPLETE WITH LUMASON

## 2017-01-01 PROCEDURE — 83550 IRON BINDING TEST: CPT | Performed by: INTERNAL MEDICINE

## 2017-01-01 PROCEDURE — 97530 THERAPEUTIC ACTIVITIES: CPT | Mod: GP

## 2017-01-01 PROCEDURE — 82607 VITAMIN B-12: CPT | Performed by: INTERNAL MEDICINE

## 2017-01-01 PROCEDURE — 25000128 H RX IP 250 OP 636: Performed by: EMERGENCY MEDICINE

## 2017-01-01 PROCEDURE — 99221 1ST HOSP IP/OBS SF/LOW 40: CPT | Mod: 25 | Performed by: INTERNAL MEDICINE

## 2017-01-01 PROCEDURE — 82565 ASSAY OF CREATININE: CPT | Performed by: INTERNAL MEDICINE

## 2017-01-01 PROCEDURE — 82272 OCCULT BLD FECES 1-3 TESTS: CPT | Performed by: FAMILY MEDICINE

## 2017-01-01 PROCEDURE — 40000193 ZZH STATISTIC PT WARD VISIT: Performed by: PHYSICAL THERAPIST

## 2017-01-01 PROCEDURE — 86901 BLOOD TYPING SEROLOGIC RH(D): CPT | Performed by: INTERNAL MEDICINE

## 2017-01-01 PROCEDURE — 82746 ASSAY OF FOLIC ACID SERUM: CPT | Performed by: INTERNAL MEDICINE

## 2017-01-01 PROCEDURE — 85027 COMPLETE CBC AUTOMATED: CPT | Performed by: INTERNAL MEDICINE

## 2017-01-01 PROCEDURE — 86901 BLOOD TYPING SEROLOGIC RH(D): CPT | Performed by: HOSPITALIST

## 2017-01-01 PROCEDURE — 84443 ASSAY THYROID STIM HORMONE: CPT | Performed by: INTERNAL MEDICINE

## 2017-01-01 PROCEDURE — 99239 HOSP IP/OBS DSCHRG MGMT >30: CPT | Performed by: INTERNAL MEDICINE

## 2017-01-01 PROCEDURE — 99207 ZZC CDG-CORRECTLY CODED, REVIEWED AND AGREE: CPT | Performed by: INTERNAL MEDICINE

## 2017-01-01 PROCEDURE — 99222 1ST HOSP IP/OBS MODERATE 55: CPT | Performed by: UROLOGY

## 2017-01-01 PROCEDURE — 97110 THERAPEUTIC EXERCISES: CPT | Mod: GP | Performed by: PHYSICAL THERAPIST

## 2017-01-01 PROCEDURE — 80076 HEPATIC FUNCTION PANEL: CPT | Performed by: INTERNAL MEDICINE

## 2017-01-01 PROCEDURE — 93306 TTE W/DOPPLER COMPLETE: CPT | Mod: 26 | Performed by: INTERNAL MEDICINE

## 2017-01-01 PROCEDURE — 99309 SBSQ NF CARE MODERATE MDM 30: CPT | Performed by: NURSE PRACTITIONER

## 2017-01-01 PROCEDURE — 81001 URINALYSIS AUTO W/SCOPE: CPT | Performed by: FAMILY MEDICINE

## 2017-01-01 PROCEDURE — 99285 EMERGENCY DEPT VISIT HI MDM: CPT | Mod: 25

## 2017-01-01 PROCEDURE — 25000131 ZZH RX MED GY IP 250 OP 636 PS 637: Mod: GY | Performed by: INTERNAL MEDICINE

## 2017-01-01 PROCEDURE — 97161 PT EVAL LOW COMPLEX 20 MIN: CPT | Mod: GP

## 2017-01-01 PROCEDURE — 83036 HEMOGLOBIN GLYCOSYLATED A1C: CPT | Performed by: INTERNAL MEDICINE

## 2017-01-01 PROCEDURE — 85014 HEMATOCRIT: CPT | Performed by: HOSPITALIST

## 2017-01-01 PROCEDURE — 80053 COMPREHEN METABOLIC PANEL: CPT | Performed by: EMERGENCY MEDICINE

## 2017-01-01 PROCEDURE — 85025 COMPLETE CBC W/AUTO DIFF WBC: CPT | Performed by: INTERNAL MEDICINE

## 2017-01-01 PROCEDURE — 40000193 ZZH STATISTIC PT WARD VISIT

## 2017-01-01 PROCEDURE — 86900 BLOOD TYPING SEROLOGIC ABO: CPT | Performed by: INTERNAL MEDICINE

## 2017-01-01 PROCEDURE — 74176 CT ABD & PELVIS W/O CONTRAST: CPT

## 2017-01-01 PROCEDURE — 93005 ELECTROCARDIOGRAM TRACING: CPT

## 2017-01-01 PROCEDURE — 86900 BLOOD TYPING SEROLOGIC ABO: CPT | Performed by: HOSPITALIST

## 2017-01-01 PROCEDURE — 99214 OFFICE O/P EST MOD 30 MIN: CPT | Performed by: FAMILY MEDICINE

## 2017-01-01 PROCEDURE — 25800025 ZZH RX 258: Performed by: EMERGENCY MEDICINE

## 2017-01-01 PROCEDURE — 87804 INFLUENZA ASSAY W/OPTIC: CPT | Performed by: EMERGENCY MEDICINE

## 2017-01-01 PROCEDURE — 99231 SBSQ HOSP IP/OBS SF/LOW 25: CPT | Performed by: UROLOGY

## 2017-01-01 PROCEDURE — 99309 SBSQ NF CARE MODERATE MDM 30: CPT | Performed by: INTERNAL MEDICINE

## 2017-01-01 PROCEDURE — 80048 BASIC METABOLIC PNL TOTAL CA: CPT | Performed by: HOSPITALIST

## 2017-01-01 PROCEDURE — 96361 HYDRATE IV INFUSION ADD-ON: CPT

## 2017-01-01 PROCEDURE — P9016 RBC LEUKOCYTES REDUCED: HCPCS | Performed by: INTERNAL MEDICINE

## 2017-01-01 PROCEDURE — 99221 1ST HOSP IP/OBS SF/LOW 40: CPT | Performed by: UROLOGY

## 2017-01-01 PROCEDURE — 87040 BLOOD CULTURE FOR BACTERIA: CPT | Performed by: EMERGENCY MEDICINE

## 2017-01-01 PROCEDURE — 99215 OFFICE O/P EST HI 40 MIN: CPT | Performed by: FAMILY MEDICINE

## 2017-01-01 PROCEDURE — 82550 ASSAY OF CK (CPK): CPT | Performed by: INTERNAL MEDICINE

## 2017-01-01 PROCEDURE — 87493 C DIFF AMPLIFIED PROBE: CPT | Performed by: HOSPITALIST

## 2017-01-01 PROCEDURE — 97161 PT EVAL LOW COMPLEX 20 MIN: CPT | Mod: GP | Performed by: PHYSICAL THERAPIST

## 2017-01-01 PROCEDURE — 96374 THER/PROPH/DIAG INJ IV PUSH: CPT

## 2017-01-01 PROCEDURE — 86850 RBC ANTIBODY SCREEN: CPT | Performed by: INTERNAL MEDICINE

## 2017-01-01 RX ORDER — ATORVASTATIN CALCIUM 20 MG/1
20 TABLET, FILM COATED ORAL EVERY EVENING
Status: DISCONTINUED | OUTPATIENT
Start: 2017-01-01 | End: 2017-01-01 | Stop reason: HOSPADM

## 2017-01-01 RX ORDER — BLOOD-GLUCOSE CONTROL, NORMAL
EACH MISCELLANEOUS
Qty: 1 EACH | Refills: 0 | Status: SHIPPED | OUTPATIENT
Start: 2017-01-01

## 2017-01-01 RX ORDER — ACETAMINOPHEN 325 MG/1
650 TABLET ORAL EVERY 4 HOURS PRN
Status: DISCONTINUED | OUTPATIENT
Start: 2017-01-01 | End: 2017-01-01 | Stop reason: HOSPADM

## 2017-01-01 RX ORDER — DEXTROSE MONOHYDRATE 25 G/50ML
25-50 INJECTION, SOLUTION INTRAVENOUS
Status: DISCONTINUED | OUTPATIENT
Start: 2017-01-01 | End: 2017-01-01 | Stop reason: HOSPADM

## 2017-01-01 RX ORDER — SENNOSIDES 8.6 MG
2 TABLET ORAL DAILY PRN
Status: DISCONTINUED | OUTPATIENT
Start: 2017-01-01 | End: 2017-01-01 | Stop reason: HOSPADM

## 2017-01-01 RX ORDER — PROCHLORPERAZINE MALEATE 5 MG
5 TABLET ORAL EVERY 6 HOURS PRN
Status: DISCONTINUED | OUTPATIENT
Start: 2017-01-01 | End: 2017-01-01 | Stop reason: HOSPADM

## 2017-01-01 RX ORDER — FUROSEMIDE 10 MG/ML
40 INJECTION INTRAMUSCULAR; INTRAVENOUS ONCE
Status: CANCELLED | OUTPATIENT
Start: 2017-01-01

## 2017-01-01 RX ORDER — CITALOPRAM HYDROBROMIDE 10 MG/1
10 TABLET ORAL DAILY
Qty: 30 TABLET | Refills: 3 | Status: SHIPPED | OUTPATIENT
Start: 2017-01-01 | End: 2017-01-01

## 2017-01-01 RX ORDER — ISOSORBIDE MONONITRATE 30 MG/1
30 TABLET, EXTENDED RELEASE ORAL DAILY
Status: DISCONTINUED | OUTPATIENT
Start: 2017-01-01 | End: 2017-01-01

## 2017-01-01 RX ORDER — PROCHLORPERAZINE 25 MG
12.5 SUPPOSITORY, RECTAL RECTAL EVERY 12 HOURS PRN
Status: DISCONTINUED | OUTPATIENT
Start: 2017-01-01 | End: 2017-01-01 | Stop reason: HOSPADM

## 2017-01-01 RX ORDER — ASPIRIN 81 MG/1
324 TABLET, CHEWABLE ORAL ONCE
Status: COMPLETED | OUTPATIENT
Start: 2017-01-01 | End: 2017-01-01

## 2017-01-01 RX ORDER — ACETAMINOPHEN 650 MG/1
650 SUPPOSITORY RECTAL EVERY 4 HOURS PRN
Status: DISCONTINUED | OUTPATIENT
Start: 2017-01-01 | End: 2017-01-01 | Stop reason: HOSPADM

## 2017-01-01 RX ORDER — FERROUS SULFATE 325(65) MG
325 TABLET ORAL 2 TIMES DAILY
Status: DISCONTINUED | OUTPATIENT
Start: 2017-01-01 | End: 2017-01-01 | Stop reason: HOSPADM

## 2017-01-01 RX ORDER — MULTIPLE VITAMINS W/ MINERALS TAB 9MG-400MCG
1 TAB ORAL DAILY
Status: DISCONTINUED | OUTPATIENT
Start: 2017-01-01 | End: 2017-01-01 | Stop reason: HOSPADM

## 2017-01-01 RX ORDER — MORPHINE SULFATE 20 MG/ML
2.5 SOLUTION ORAL
Qty: 30 ML | Refills: 0 | Status: SHIPPED | OUTPATIENT
Start: 2017-01-01 | End: 2017-01-01

## 2017-01-01 RX ORDER — ACETAMINOPHEN 325 MG/1
650 TABLET ORAL ONCE
Status: COMPLETED | OUTPATIENT
Start: 2017-01-01 | End: 2017-01-01

## 2017-01-01 RX ORDER — SODIUM CHLORIDE 9 MG/ML
INJECTION, SOLUTION INTRAVENOUS CONTINUOUS
Status: DISCONTINUED | OUTPATIENT
Start: 2017-01-01 | End: 2017-01-01 | Stop reason: HOSPADM

## 2017-01-01 RX ORDER — CIPROFLOXACIN 250 MG/1
250 TABLET, FILM COATED ORAL 2 TIMES DAILY
Qty: 14 TABLET | Refills: 0 | Status: SHIPPED | OUTPATIENT
Start: 2017-01-01 | End: 2017-01-01

## 2017-01-01 RX ORDER — FOLIC ACID 1 MG/1
1 TABLET ORAL DAILY
Status: DISCONTINUED | OUTPATIENT
Start: 2017-01-01 | End: 2017-01-01 | Stop reason: HOSPADM

## 2017-01-01 RX ORDER — ACETAMINOPHEN 650 MG/1
650 SUPPOSITORY RECTAL EVERY 4 HOURS PRN
Qty: 12 SUPPOSITORY | Refills: 1 | Status: SHIPPED | OUTPATIENT
Start: 2017-01-01

## 2017-01-01 RX ORDER — LORAZEPAM 2 MG/ML
0.5 CONCENTRATE ORAL EVERY 4 HOURS PRN
Qty: 30 ML | Refills: 1 | Status: SHIPPED | OUTPATIENT
Start: 2017-01-01 | End: 2017-01-01

## 2017-01-01 RX ORDER — ONDANSETRON 2 MG/ML
4 INJECTION INTRAMUSCULAR; INTRAVENOUS EVERY 6 HOURS PRN
Status: DISCONTINUED | OUTPATIENT
Start: 2017-01-01 | End: 2017-01-01 | Stop reason: HOSPADM

## 2017-01-01 RX ORDER — CEFIXIME 100 MG/5ML
260 POWDER, FOR SUSPENSION ORAL DAILY
Status: DISCONTINUED | OUTPATIENT
Start: 2017-01-01 | End: 2017-01-01 | Stop reason: HOSPADM

## 2017-01-01 RX ORDER — NICOTINE POLACRILEX 4 MG
15-30 LOZENGE BUCCAL
Status: DISCONTINUED | OUTPATIENT
Start: 2017-01-01 | End: 2017-01-01 | Stop reason: HOSPADM

## 2017-01-01 RX ORDER — BLOOD-GLUCOSE CONTROL, NORMAL
EACH MISCELLANEOUS
Qty: 1 EACH | Refills: 0 | Status: SHIPPED | OUTPATIENT
Start: 2017-01-01 | End: 2017-01-01

## 2017-01-01 RX ORDER — NITROGLYCERIN 0.4 MG/1
0.4 TABLET SUBLINGUAL EVERY 5 MIN PRN
Status: DISCONTINUED | OUTPATIENT
Start: 2017-01-01 | End: 2017-01-01 | Stop reason: HOSPADM

## 2017-01-01 RX ORDER — LIDOCAINE 40 MG/G
CREAM TOPICAL
Status: DISCONTINUED | OUTPATIENT
Start: 2017-01-01 | End: 2017-01-01

## 2017-01-01 RX ORDER — BISACODYL 10 MG
SUPPOSITORY, RECTAL RECTAL
Qty: 12 SUPPOSITORY | Refills: 1 | Status: SHIPPED | OUTPATIENT
Start: 2017-01-01

## 2017-01-01 RX ORDER — CEFTRIAXONE 1 G/1
1 INJECTION, POWDER, FOR SOLUTION INTRAMUSCULAR; INTRAVENOUS EVERY 24 HOURS
Status: DISCONTINUED | OUTPATIENT
Start: 2017-01-01 | End: 2017-01-01

## 2017-01-01 RX ORDER — ACETAMINOPHEN 500 MG
1000 TABLET ORAL ONCE
Status: COMPLETED | OUTPATIENT
Start: 2017-01-01 | End: 2017-01-01

## 2017-01-01 RX ORDER — FUROSEMIDE 10 MG/ML
40 INJECTION INTRAMUSCULAR; INTRAVENOUS ONCE
Status: COMPLETED | OUTPATIENT
Start: 2017-01-01 | End: 2017-01-01

## 2017-01-01 RX ORDER — CEFUROXIME AXETIL 500 MG/1
500 TABLET ORAL 2 TIMES DAILY
Qty: 22 TABLET | Refills: 0 | DISCHARGE
Start: 2017-01-01 | End: 2017-01-01

## 2017-01-01 RX ORDER — AMOXICILLIN 250 MG
1-2 CAPSULE ORAL 2 TIMES DAILY PRN
Status: DISCONTINUED | OUTPATIENT
Start: 2017-01-01 | End: 2017-01-01 | Stop reason: HOSPADM

## 2017-01-01 RX ORDER — LABETALOL 100 MG/1
300 TABLET, FILM COATED ORAL 2 TIMES DAILY
Status: DISCONTINUED | OUTPATIENT
Start: 2017-01-01 | End: 2017-01-01 | Stop reason: HOSPADM

## 2017-01-01 RX ORDER — DEXTROSE MONOHYDRATE 25 G/50ML
25-50 INJECTION, SOLUTION INTRAVENOUS
Status: DISCONTINUED | OUTPATIENT
Start: 2017-01-01 | End: 2017-01-01

## 2017-01-01 RX ORDER — PIPERACILLIN SODIUM, TAZOBACTAM SODIUM 3; .375 G/15ML; G/15ML
3.38 INJECTION, POWDER, LYOPHILIZED, FOR SOLUTION INTRAVENOUS ONCE
Status: COMPLETED | OUTPATIENT
Start: 2017-01-01 | End: 2017-01-01

## 2017-01-01 RX ORDER — ONDANSETRON 4 MG/1
4 TABLET, ORALLY DISINTEGRATING ORAL EVERY 6 HOURS PRN
Status: DISCONTINUED | OUTPATIENT
Start: 2017-01-01 | End: 2017-01-01 | Stop reason: HOSPADM

## 2017-01-01 RX ORDER — CIPROFLOXACIN 500 MG/1
500 TABLET, FILM COATED ORAL EVERY 12 HOURS SCHEDULED
Status: DISCONTINUED | OUTPATIENT
Start: 2017-01-01 | End: 2017-01-01 | Stop reason: HOSPADM

## 2017-01-01 RX ORDER — CITALOPRAM HYDROBROMIDE 20 MG/1
20 TABLET ORAL DAILY
Status: DISCONTINUED | OUTPATIENT
Start: 2017-01-01 | End: 2017-01-01 | Stop reason: HOSPADM

## 2017-01-01 RX ORDER — FOLIC ACID 1 MG/1
1 TABLET ORAL DAILY
Qty: 30 TABLET | DISCHARGE
Start: 2017-01-01

## 2017-01-01 RX ORDER — CEFIXIME 100 MG/5ML
260 POWDER, FOR SUSPENSION ORAL DAILY
Qty: 91 ML | Refills: 0 | Status: SHIPPED | OUTPATIENT
Start: 2017-01-01 | End: 2017-01-01

## 2017-01-01 RX ORDER — NICOTINE POLACRILEX 4 MG
15-30 LOZENGE BUCCAL
Status: DISCONTINUED | OUTPATIENT
Start: 2017-01-01 | End: 2017-01-01

## 2017-01-01 RX ORDER — CARVEDILOL 12.5 MG/1
12.5 TABLET ORAL 2 TIMES DAILY WITH MEALS
Qty: 60 TABLET | Refills: 0 | Status: SHIPPED | OUTPATIENT
Start: 2017-01-01

## 2017-01-01 RX ORDER — SENNOSIDES 8.6 MG
2 TABLET ORAL 2 TIMES DAILY PRN
Qty: 120 EACH | DISCHARGE
Start: 2017-01-01

## 2017-01-01 RX ORDER — CITALOPRAM HYDROBROMIDE 10 MG/1
TABLET ORAL
Qty: 90 TABLET | Refills: 3 | OUTPATIENT
Start: 2017-01-01

## 2017-01-01 RX ORDER — NALOXONE HYDROCHLORIDE 0.4 MG/ML
.1-.4 INJECTION, SOLUTION INTRAMUSCULAR; INTRAVENOUS; SUBCUTANEOUS
Status: DISCONTINUED | OUTPATIENT
Start: 2017-01-01 | End: 2017-01-01 | Stop reason: HOSPADM

## 2017-01-01 RX ORDER — CARVEDILOL 12.5 MG/1
12.5 TABLET ORAL 2 TIMES DAILY WITH MEALS
Status: DISCONTINUED | OUTPATIENT
Start: 2017-01-01 | End: 2017-01-01 | Stop reason: HOSPADM

## 2017-01-01 RX ORDER — LIDOCAINE 40 MG/G
CREAM TOPICAL
Status: DISCONTINUED | OUTPATIENT
Start: 2017-01-01 | End: 2017-01-01 | Stop reason: HOSPADM

## 2017-01-01 RX ORDER — CIPROFLOXACIN 500 MG/1
500 TABLET, FILM COATED ORAL 2 TIMES DAILY
Qty: 14 TABLET | Refills: 0 | Status: SHIPPED | OUTPATIENT
Start: 2017-01-01 | End: 2017-01-01

## 2017-01-01 RX ORDER — ACETAMINOPHEN 325 MG/1
650 TABLET ORAL EVERY 4 HOURS PRN
Status: DISCONTINUED | OUTPATIENT
Start: 2017-01-01 | End: 2017-01-01

## 2017-01-01 RX ORDER — ATORVASTATIN CALCIUM 20 MG/1
20 TABLET, FILM COATED ORAL EVERY EVENING
Status: DISCONTINUED | OUTPATIENT
Start: 2017-01-01 | End: 2017-01-01

## 2017-01-01 RX ORDER — SODIUM CHLORIDE 9 MG/ML
INJECTION, SOLUTION INTRAVENOUS CONTINUOUS
Status: DISCONTINUED | OUTPATIENT
Start: 2017-01-01 | End: 2017-01-01 | Stop reason: CLARIF

## 2017-01-01 RX ORDER — POLYETHYLENE GLYCOL 3350 17 G/17G
17 POWDER, FOR SOLUTION ORAL DAILY PRN
Status: DISCONTINUED | OUTPATIENT
Start: 2017-01-01 | End: 2017-01-01 | Stop reason: HOSPADM

## 2017-01-01 RX ORDER — SENNOSIDES 8.6 MG
2 TABLET ORAL 2 TIMES DAILY
Status: DISCONTINUED | OUTPATIENT
Start: 2017-01-01 | End: 2017-01-01

## 2017-01-01 RX ORDER — MORPHINE SULFATE 30 MG/1
2.5 TABLET ORAL
Qty: 30 TABLET | Refills: 0 | Status: SHIPPED | OUTPATIENT
Start: 2017-01-01

## 2017-01-01 RX ORDER — CITALOPRAM HYDROBROMIDE 10 MG/1
10 TABLET ORAL DAILY
Status: DISCONTINUED | OUTPATIENT
Start: 2017-01-01 | End: 2017-01-01 | Stop reason: HOSPADM

## 2017-01-01 RX ORDER — ALUMINA, MAGNESIA, AND SIMETHICONE 2400; 2400; 240 MG/30ML; MG/30ML; MG/30ML
15-30 SUSPENSION ORAL EVERY 4 HOURS PRN
Status: DISCONTINUED | OUTPATIENT
Start: 2017-01-01 | End: 2017-01-01 | Stop reason: HOSPADM

## 2017-01-01 RX ORDER — FERROUS SULFATE 325(65) MG
325 TABLET ORAL 2 TIMES DAILY WITH MEALS
Status: DISCONTINUED | OUTPATIENT
Start: 2017-01-01 | End: 2017-01-01 | Stop reason: HOSPADM

## 2017-01-01 RX ORDER — LORAZEPAM 0.5 MG/1
0.5 TABLET ORAL EVERY 4 HOURS PRN
Qty: 30 TABLET | Refills: 1 | Status: SHIPPED | OUTPATIENT
Start: 2017-01-01

## 2017-01-01 RX ORDER — HALOPERIDOL 2 MG/ML
2 SOLUTION ORAL EVERY 6 HOURS PRN
Qty: 30 ML | Refills: 1 | Status: SHIPPED | OUTPATIENT
Start: 2017-01-01 | End: 2017-01-01

## 2017-01-01 RX ORDER — BISACODYL 10 MG
10 SUPPOSITORY, RECTAL RECTAL DAILY PRN
Status: DISCONTINUED | OUTPATIENT
Start: 2017-01-01 | End: 2017-01-01 | Stop reason: HOSPADM

## 2017-01-01 RX ORDER — BISACODYL 10 MG
10 SUPPOSITORY, RECTAL RECTAL ONCE
Status: COMPLETED | OUTPATIENT
Start: 2017-01-01 | End: 2017-01-01

## 2017-01-01 RX ORDER — FUROSEMIDE 40 MG
40 TABLET ORAL DAILY
Status: DISCONTINUED | OUTPATIENT
Start: 2017-01-01 | End: 2017-01-01

## 2017-01-01 RX ORDER — ISOSORBIDE MONONITRATE 60 MG/1
60 TABLET, EXTENDED RELEASE ORAL DAILY
Status: DISCONTINUED | OUTPATIENT
Start: 2017-01-01 | End: 2017-01-01 | Stop reason: HOSPADM

## 2017-01-01 RX ORDER — SODIUM CHLORIDE 9 MG/ML
INJECTION, SOLUTION INTRAVENOUS CONTINUOUS
Status: DISCONTINUED | OUTPATIENT
Start: 2017-01-01 | End: 2017-01-01

## 2017-01-01 RX ORDER — SODIUM CHLORIDE 9 MG/ML
1000 INJECTION, SOLUTION INTRAVENOUS CONTINUOUS
Status: DISCONTINUED | OUTPATIENT
Start: 2017-01-01 | End: 2017-01-01

## 2017-01-01 RX ORDER — ATROPINE SULFATE 10 MG/ML
2 SOLUTION/ DROPS OPHTHALMIC
Qty: 5 ML | Refills: 1 | Status: SHIPPED | OUTPATIENT
Start: 2017-01-01

## 2017-01-01 RX ADMIN — ATORVASTATIN CALCIUM 20 MG: 20 TABLET, FILM COATED ORAL at 20:40

## 2017-01-01 RX ADMIN — SODIUM CHLORIDE: 9 INJECTION, SOLUTION INTRAVENOUS at 19:22

## 2017-01-01 RX ADMIN — CIPROFLOXACIN HYDROCHLORIDE 500 MG: 500 TABLET, FILM COATED ORAL at 08:49

## 2017-01-01 RX ADMIN — FOLIC ACID 1 MG: 1 TABLET ORAL at 20:23

## 2017-01-01 RX ADMIN — CITALOPRAM HYDROBROMIDE 10 MG: 10 TABLET ORAL at 08:53

## 2017-01-01 RX ADMIN — CIPROFLOXACIN HYDROCHLORIDE 500 MG: 500 TABLET, FILM COATED ORAL at 20:47

## 2017-01-01 RX ADMIN — FOLIC ACID 1 MG: 1 TABLET ORAL at 09:51

## 2017-01-01 RX ADMIN — CARVEDILOL 12.5 MG: 12.5 TABLET, FILM COATED ORAL at 09:51

## 2017-01-01 RX ADMIN — ISOSORBIDE MONONITRATE 30 MG: 30 TABLET, EXTENDED RELEASE ORAL at 09:51

## 2017-01-01 RX ADMIN — CEFTRIAXONE 1 G: 1 INJECTION, POWDER, FOR SOLUTION INTRAMUSCULAR; INTRAVENOUS at 21:10

## 2017-01-01 RX ADMIN — ISOSORBIDE MONONITRATE 60 MG: 60 TABLET, EXTENDED RELEASE ORAL at 09:04

## 2017-01-01 RX ADMIN — INSULIN ASPART 1 UNITS: 100 INJECTION, SOLUTION INTRAVENOUS; SUBCUTANEOUS at 12:16

## 2017-01-01 RX ADMIN — CEFTRIAXONE 1 G: 1 INJECTION, POWDER, FOR SOLUTION INTRAMUSCULAR; INTRAVENOUS at 20:02

## 2017-01-01 RX ADMIN — FOLIC ACID 1 MG: 1 TABLET ORAL at 08:33

## 2017-01-01 RX ADMIN — FERROUS SULFATE TAB 325 MG (65 MG ELEMENTAL FE) 325 MG: 325 (65 FE) TAB at 08:33

## 2017-01-01 RX ADMIN — CITALOPRAM HYDROBROMIDE 20 MG: 20 TABLET ORAL at 09:04

## 2017-01-01 RX ADMIN — SODIUM CHLORIDE: 9 INJECTION, SOLUTION INTRAVENOUS at 15:57

## 2017-01-01 RX ADMIN — CITALOPRAM HYDROBROMIDE 20 MG: 20 TABLET ORAL at 08:02

## 2017-01-01 RX ADMIN — LABETALOL HYDROCHLORIDE 300 MG: 100 TABLET, FILM COATED ORAL at 20:45

## 2017-01-01 RX ADMIN — LABETALOL HCL 300 MG: 200 TABLET, FILM COATED ORAL at 10:04

## 2017-01-01 RX ADMIN — LABETALOL HYDROCHLORIDE 300 MG: 100 TABLET, FILM COATED ORAL at 08:33

## 2017-01-01 RX ADMIN — SENNOSIDES 2 TABLET: 8.6 TABLET, FILM COATED ORAL at 22:53

## 2017-01-01 RX ADMIN — ACETAMINOPHEN 1000 MG: 500 TABLET ORAL at 17:08

## 2017-01-01 RX ADMIN — CITALOPRAM HYDROBROMIDE 20 MG: 20 TABLET ORAL at 09:51

## 2017-01-01 RX ADMIN — ACETAMINOPHEN 650 MG: 325 TABLET, FILM COATED ORAL at 01:02

## 2017-01-01 RX ADMIN — LABETALOL HYDROCHLORIDE 300 MG: 100 TABLET, FILM COATED ORAL at 23:56

## 2017-01-01 RX ADMIN — CARVEDILOL 12.5 MG: 12.5 TABLET, FILM COATED ORAL at 07:38

## 2017-01-01 RX ADMIN — FERROUS SULFATE TAB 325 MG (65 MG ELEMENTAL FE) 325 MG: 325 (65 FE) TAB at 20:02

## 2017-01-01 RX ADMIN — FERROUS SULFATE TAB 325 MG (65 MG ELEMENTAL FE) 325 MG: 325 (65 FE) TAB at 08:59

## 2017-01-01 RX ADMIN — LABETALOL HYDROCHLORIDE 300 MG: 100 TABLET, FILM COATED ORAL at 08:25

## 2017-01-01 RX ADMIN — FERROUS SULFATE TAB 325 MG (65 MG ELEMENTAL FE) 325 MG: 325 (65 FE) TAB at 08:02

## 2017-01-01 RX ADMIN — CEFTRIAXONE 1 G: 1 INJECTION, POWDER, FOR SOLUTION INTRAMUSCULAR; INTRAVENOUS at 19:19

## 2017-01-01 RX ADMIN — SODIUM CHLORIDE: 9 INJECTION, SOLUTION INTRAVENOUS at 19:23

## 2017-01-01 RX ADMIN — ACETAMINOPHEN 650 MG: 325 TABLET, FILM COATED ORAL at 20:47

## 2017-01-01 RX ADMIN — LABETALOL HYDROCHLORIDE 300 MG: 100 TABLET, FILM COATED ORAL at 08:53

## 2017-01-01 RX ADMIN — CITALOPRAM HYDROBROMIDE 10 MG: 10 TABLET ORAL at 08:25

## 2017-01-01 RX ADMIN — SODIUM CHLORIDE: 9 INJECTION, SOLUTION INTRAVENOUS at 05:51

## 2017-01-01 RX ADMIN — POLYETHYLENE GLYCOL 3350 17 G: 17 POWDER, FOR SOLUTION ORAL at 08:33

## 2017-01-01 RX ADMIN — VITAMIN D, TAB 1000IU (100/BT) 2000 UNITS: 25 TAB at 09:04

## 2017-01-01 RX ADMIN — SODIUM CHLORIDE: 9 INJECTION, SOLUTION INTRAVENOUS at 18:52

## 2017-01-01 RX ADMIN — ASPIRIN 81 MG 324 MG: 81 TABLET ORAL at 00:09

## 2017-01-01 RX ADMIN — ISOSORBIDE MONONITRATE 30 MG: 30 TABLET, EXTENDED RELEASE ORAL at 08:03

## 2017-01-01 RX ADMIN — ACETAMINOPHEN 650 MG: 325 TABLET, FILM COATED ORAL at 16:27

## 2017-01-01 RX ADMIN — MULTIPLE VITAMINS W/ MINERALS TAB 1 TABLET: TAB at 08:02

## 2017-01-01 RX ADMIN — CEFIXIME 260 MG: 100 POWDER, FOR SUSPENSION ORAL at 08:35

## 2017-01-01 RX ADMIN — CITALOPRAM HYDROBROMIDE 10 MG: 10 TABLET ORAL at 08:33

## 2017-01-01 RX ADMIN — INSULIN ASPART 1 UNITS: 100 INJECTION, SOLUTION INTRAVENOUS; SUBCUTANEOUS at 13:12

## 2017-01-01 RX ADMIN — ISOSORBIDE MONONITRATE 30 MG: 30 TABLET, EXTENDED RELEASE ORAL at 16:07

## 2017-01-01 RX ADMIN — SENNOSIDES 2 TABLET: 8.6 TABLET, FILM COATED ORAL at 08:32

## 2017-01-01 RX ADMIN — CARVEDILOL 12.5 MG: 12.5 TABLET, FILM COATED ORAL at 19:13

## 2017-01-01 RX ADMIN — ATORVASTATIN CALCIUM 20 MG: 20 TABLET, FILM COATED ORAL at 22:05

## 2017-01-01 RX ADMIN — CARVEDILOL 12.5 MG: 12.5 TABLET, FILM COATED ORAL at 16:07

## 2017-01-01 RX ADMIN — FERROUS SULFATE TAB 325 MG (65 MG ELEMENTAL FE) 325 MG: 325 (65 FE) TAB at 20:23

## 2017-01-01 RX ADMIN — FOLIC ACID 1 MG: 1 TABLET ORAL at 14:49

## 2017-01-01 RX ADMIN — ATORVASTATIN CALCIUM 20 MG: 20 TABLET, FILM COATED ORAL at 00:09

## 2017-01-01 RX ADMIN — FERROUS SULFATE TAB 325 MG (65 MG ELEMENTAL FE) 325 MG: 325 (65 FE) TAB at 10:05

## 2017-01-01 RX ADMIN — VITAMIN D, TAB 1000IU (100/BT) 2000 UNITS: 25 TAB at 09:51

## 2017-01-01 RX ADMIN — FERROUS SULFATE TAB 325 MG (65 MG ELEMENTAL FE) 325 MG: 325 (65 FE) TAB at 09:51

## 2017-01-01 RX ADMIN — CEFIXIME 260 MG: 100 POWDER, FOR SUSPENSION ORAL at 12:17

## 2017-01-01 RX ADMIN — ATORVASTATIN CALCIUM 20 MG: 20 TABLET, FILM COATED ORAL at 20:48

## 2017-01-01 RX ADMIN — FERROUS SULFATE TAB 325 MG (65 MG ELEMENTAL FE) 325 MG: 325 (65 FE) TAB at 13:08

## 2017-01-01 RX ADMIN — FERROUS SULFATE TAB 325 MG (65 MG ELEMENTAL FE) 325 MG: 325 (65 FE) TAB at 22:05

## 2017-01-01 RX ADMIN — ATORVASTATIN CALCIUM 20 MG: 20 TABLET, FILM COATED ORAL at 22:53

## 2017-01-01 RX ADMIN — LABETALOL HYDROCHLORIDE 300 MG: 100 TABLET, FILM COATED ORAL at 20:46

## 2017-01-01 RX ADMIN — FERROUS SULFATE TAB 325 MG (65 MG ELEMENTAL FE) 325 MG: 325 (65 FE) TAB at 09:04

## 2017-01-01 RX ADMIN — CEFTRIAXONE 1 G: 1 INJECTION, POWDER, FOR SOLUTION INTRAMUSCULAR; INTRAVENOUS at 18:52

## 2017-01-01 RX ADMIN — SODIUM CHLORIDE: 9 INJECTION, SOLUTION INTRAVENOUS at 22:53

## 2017-01-01 RX ADMIN — LABETALOL HCL 300 MG: 200 TABLET, FILM COATED ORAL at 00:08

## 2017-01-01 RX ADMIN — FERROUS SULFATE TAB 325 MG (65 MG ELEMENTAL FE) 325 MG: 325 (65 FE) TAB at 20:48

## 2017-01-01 RX ADMIN — FOLIC ACID 1 MG: 1 TABLET ORAL at 08:49

## 2017-01-01 RX ADMIN — FERROUS SULFATE TAB 325 MG (65 MG ELEMENTAL FE) 325 MG: 325 (65 FE) TAB at 20:40

## 2017-01-01 RX ADMIN — SODIUM CHLORIDE, POTASSIUM CHLORIDE, SODIUM LACTATE AND CALCIUM CHLORIDE 1000 ML: 600; 310; 30; 20 INJECTION, SOLUTION INTRAVENOUS at 16:51

## 2017-01-01 RX ADMIN — FOLIC ACID 1 MG: 1 TABLET ORAL at 08:03

## 2017-01-01 RX ADMIN — CARVEDILOL 12.5 MG: 12.5 TABLET, FILM COATED ORAL at 18:18

## 2017-01-01 RX ADMIN — FERROUS SULFATE TAB 325 MG (65 MG ELEMENTAL FE) 325 MG: 325 (65 FE) TAB at 17:47

## 2017-01-01 RX ADMIN — BISACODYL 10 MG: 10 SUPPOSITORY RECTAL at 10:24

## 2017-01-01 RX ADMIN — FUROSEMIDE 40 MG: 40 TABLET ORAL at 16:07

## 2017-01-01 RX ADMIN — FOLIC ACID 1 MG: 1 TABLET ORAL at 09:04

## 2017-01-01 RX ADMIN — INSULIN ASPART 1 UNITS: 100 INJECTION, SOLUTION INTRAVENOUS; SUBCUTANEOUS at 17:20

## 2017-01-01 RX ADMIN — LABETALOL HYDROCHLORIDE 300 MG: 100 TABLET, FILM COATED ORAL at 08:59

## 2017-01-01 RX ADMIN — SODIUM CHLORIDE 1000 ML: 9 INJECTION, SOLUTION INTRAVENOUS at 16:41

## 2017-01-01 RX ADMIN — LABETALOL HYDROCHLORIDE 300 MG: 100 TABLET, FILM COATED ORAL at 08:49

## 2017-01-01 RX ADMIN — LABETALOL HYDROCHLORIDE 300 MG: 100 TABLET, FILM COATED ORAL at 20:13

## 2017-01-01 RX ADMIN — MULTIPLE VITAMINS W/ MINERALS TAB 1 TABLET: TAB at 09:51

## 2017-01-01 RX ADMIN — SULFUR HEXAFLUORIDE 5 ML: KIT at 11:30

## 2017-01-01 RX ADMIN — VITAMIN D, TAB 1000IU (100/BT) 2000 UNITS: 25 TAB at 08:02

## 2017-01-01 RX ADMIN — LABETALOL HYDROCHLORIDE 300 MG: 100 TABLET, FILM COATED ORAL at 20:02

## 2017-01-01 RX ADMIN — ACETAMINOPHEN 650 MG: 325 TABLET, FILM COATED ORAL at 16:51

## 2017-01-01 RX ADMIN — FUROSEMIDE 40 MG: 10 INJECTION, SOLUTION INTRAVENOUS at 21:37

## 2017-01-01 RX ADMIN — CARVEDILOL 12.5 MG: 12.5 TABLET, FILM COATED ORAL at 08:02

## 2017-01-01 RX ADMIN — CITALOPRAM HYDROBROMIDE 20 MG: 20 TABLET ORAL at 10:04

## 2017-01-01 RX ADMIN — PIPERACILLIN AND TAZOBACTAM 3.38 G: 3; .375 INJECTION, POWDER, FOR SOLUTION INTRAVENOUS at 16:42

## 2017-01-01 RX ADMIN — FUROSEMIDE 40 MG: 40 TABLET ORAL at 08:02

## 2017-01-01 RX ADMIN — LABETALOL HYDROCHLORIDE 300 MG: 100 TABLET, FILM COATED ORAL at 20:23

## 2017-01-01 RX ADMIN — FERROUS SULFATE TAB 325 MG (65 MG ELEMENTAL FE) 325 MG: 325 (65 FE) TAB at 00:08

## 2017-01-01 RX ADMIN — MULTIPLE VITAMINS W/ MINERALS TAB 1 TABLET: TAB at 10:04

## 2017-01-01 RX ADMIN — FOLIC ACID 1 MG: 1 TABLET ORAL at 10:05

## 2017-01-01 RX ADMIN — SODIUM CHLORIDE 1000 ML: 9 INJECTION, SOLUTION INTRAVENOUS at 19:10

## 2017-01-01 RX ADMIN — VITAMIN D, TAB 1000IU (100/BT) 2000 UNITS: 25 TAB at 10:04

## 2017-01-01 RX ADMIN — FERROUS SULFATE TAB 325 MG (65 MG ELEMENTAL FE) 325 MG: 325 (65 FE) TAB at 22:53

## 2017-01-01 RX ADMIN — MULTIPLE VITAMINS W/ MINERALS TAB 1 TABLET: TAB at 09:04

## 2017-01-01 RX ADMIN — CARVEDILOL 12.5 MG: 12.5 TABLET, FILM COATED ORAL at 21:27

## 2017-01-01 ASSESSMENT — ACTIVITIES OF DAILY LIVING (ADL)
DRESS: 2-->ASSISTIVE PERSON
TRANSFERRING: 1-->ASSISTIVE EQUIPMENT
NUMBER_OF_TIMES_PATIENT_HAS_FALLEN_WITHIN_LAST_SIX_MONTHS: 1
TRANSFERRING: 2-->ASSISTIVE PERSON
COGNITION: 0 - NO COGNITION ISSUES REPORTED
BATHING: 2-->ASSISTIVE PERSON
BATHING: 1-->ASSISTIVE EQUIPMENT
RETIRED_EATING: 0-->INDEPENDENT
RETIRED_COMMUNICATION: 0-->UNDERSTANDS/COMMUNICATES WITHOUT DIFFICULTY
TOILETING: 1-->ASSISTIVE EQUIPMENT
TOILETING: 2-->ASSISTIVE PERSON
RETIRED_COMMUNICATION: 0-->UNDERSTANDS/COMMUNICATES WITHOUT DIFFICULTY
RETIRED_EATING: 0-->INDEPENDENT
COGNITION: 0 - NO COGNITION ISSUES REPORTED
SWALLOWING: 0-->SWALLOWS FOODS/LIQUIDS WITHOUT DIFFICULTY
AMBULATION: 2-->ASSISTIVE PERSON
FALL_HISTORY_WITHIN_LAST_SIX_MONTHS: NO
SWALLOWING: 0-->SWALLOWS FOODS/LIQUIDS WITHOUT DIFFICULTY
DRESS: 0-->INDEPENDENT
FALL_HISTORY_WITHIN_LAST_SIX_MONTHS: YES
AMBULATION: 1-->ASSISTIVE EQUIPMENT

## 2017-01-01 ASSESSMENT — ENCOUNTER SYMPTOMS
COUGH: 0
CHILLS: 0
FEVER: 0
VOMITING: 0
NAUSEA: 0
FATIGUE: 1

## 2017-01-02 PROBLEM — N39.0 UTI (URINARY TRACT INFECTION): Status: ACTIVE | Noted: 2017-01-01

## 2017-01-02 NOTE — IP AVS SNAPSHOT
"[place] [address] ONCOLOGY: [phone]                                              INTERAGENCY TRANSFER FORM - PHYSICIAN ORDERS   2017                    Hospital Admission Date: 2017  DAVID BINGHAM   : 1933  Sex: Male        Attending Provider: Agueda Garcia MD     Allergies:  No Known Allergies    Infection:  None   Service:  HOSPITALIST    Ht:  1.702 m (5' 7\")   Wt:  74.481 kg (164 lb 3.2 oz)   Admission Wt:  72 kg (158 lb 11.7 oz)    BMI:  25.71 kg/m 2   BSA:  1.88 m 2            Patient PCP Information     Provider PCP Type    Flip Prakash MD General      ED Clinical Impression     Diagnosis Description Comment Added By Time Added    Urinary tract infection, site unspecified [N39.0] Urinary tract infection, site unspecified [N39.0]  Trierweiler, Chad A, MD 2017  8:58 PM    Generalized muscle weakness [M62.81] Generalized muscle weakness [M62.81]  Trierweiler, Chad A, MD 2017  8:58 PM      Hospital Problems as of 2017              Priority Class Noted POA    UTI (urinary tract infection) Medium  2017 Yes      Non-Hospital Problems as of 2017              Priority Class Noted    iamENTHESOPATHY OF HIP   2005    Intermittent asthma   2012    Hyperlipidemia LDL goal <70   2012    Chronic constipation   2012    Rhinorrhea   2013    Infected sebaceous cyst Medium  2014    Cerebral infarction (H)   3/11/2014    Occlusion and stenosis of carotid artery with cerebral infarction Medium  3/19/2014    PVD (peripheral vascular disease) (H) Medium  3/25/2014    Stroke (H)   3/25/2014    Constipation Medium  4/10/2014    Late effects of CVA (cerebrovascular accident) Medium  2014    Advance Care Planning   10/27/2014    Atrial fibrillation with RVR (H)   2015    Syncope and collapse   2015    Coronary artery disease involving native coronary artery of native heart without angina pectoris   2015    Bladder " cancer (H) Medium  1/4/2016    Urine frequency Medium  3/30/2016    Closed right hip fracture (H) Medium  8/17/2016    Fall Medium  8/17/2016    Pulmonary hypertension (H) Medium  Unknown    Snores Medium  Unknown    Pulmonary nodules Medium  Unknown    Anemia Medium  Unknown    Closed right hip fracture, with routine healing, subsequent encounter Medium  8/28/2016    S/P ORIF (open reduction internal fixation) fracture Medium  8/28/2016    Paroxysmal a-fib (H) Medium  8/28/2016    Anemia due to blood loss, acute Medium  8/28/2016    Malignant neoplasm of urinary bladder, unspecified site (H) Medium  8/28/2016    Constipation, unspecified constipation type Medium  8/28/2016    Pancytopenia (H) Medium  9/7/2016    Adjustment disorder with depressed mood Medium  9/14/2016    Essential hypertension with goal blood pressure less than 140/90 Medium  9/30/2016    CKD (chronic kidney disease) stage 3, GFR 30-59 ml/min Medium  10/16/2016    Thrombocytopenia (H) Medium  10/25/2016    Gross hematuria Medium  10/31/2016    Generalized muscle weakness Medium  11/12/2016    Bladder tumor Medium  12/9/2016      Code Status History     Date Active Date Inactive Code Status Order ID Comments User Context    1/4/2017  6:40 PM  DNR/DNI [id number]  Papo Ochoa MD Outpatient    1/2/2017 10:36 PM 1/4/2017  6:40 PM DNR/DNI [id number]  Agueda Garcia MD Inpatient    12/9/2016  5:20 PM 1/2/2017 10:36 PM Full Code [id number]  Flip Porter MD Outpatient    12/9/2016  3:47 PM 12/9/2016  5:20 PM Full Code [id number]  Flip Porter MD Inpatient    8/17/2016  8:16 PM 8/20/2016  9:19 AM DNR/DNI [id number]  Faustino Gunter MD Inpatient    4/8/2016  8:57 AM 8/17/2016  8:16 PM Full Code [id number]  Flip Porter MD Outpatient    1/4/2016  4:14 PM 1/5/2016  4:54 PM Full Code [id number]  Flip Porter MD Inpatient    1/4/2016 11:34 AM 1/4/2016  4:14 PM Full Code [id number]  Flip Porter MD  Outpatient    5/29/2015  2:27 PM 1/4/2016 11:34 AM DNR/DNI [id number]  Agueda Garcia MD Outpatient    5/28/2015  4:53 PM 5/29/2015  2:27 PM DNR/DNI [id number]  Agueda Garcia MD Inpatient    4/10/2014 10:48 AM 5/28/2015  4:53 PM Full Code [id number]  Luis Silveira MD Outpatient    3/26/2014 10:11 PM 4/10/2014 10:48 AM Full Code [id number]  Luis Silveira MD Inpatient    3/25/2014  9:55 AM 3/26/2014 10:11 PM DNR/DNI [id number]  Mia Kim MD Outpatient    3/20/2014 10:03 AM 3/25/2014  9:55 AM DNR/DNI [id number]  Michael Chance MD Inpatient    3/15/2014  9:51 PM 3/20/2014 10:03 AM Full Code [id number]  Yasmine Castillo RN Inpatient    3/10/2014  3:47 PM 3/15/2014  9:51 PM Full Code [id number]  Shira Sood MD Inpatient         Medication Review      START taking        Dose / Directions Comments    cefUROXime 500 MG tablet   Commonly known as:  CEFTIN   Used for:  Urinary tract infection due to Proteus        Dose:  500 mg   Take 1 tablet (500 mg) by mouth 2 times daily for 11 days   Quantity:  22 tablet   Refills:  0        folic acid 1 MG tablet   Commonly known as:  FOLVITE   Used for:  Iron deficiency anemia due to chronic blood loss        Dose:  1 mg   Take 1 tablet (1 mg) by mouth daily   Quantity:  30 tablet   Refills:  0          CONTINUE these medications which may have CHANGED, or have new prescriptions. If we are uncertain of the size of tablets/capsules you have at home, strength may be listed as something that might have changed.        Dose / Directions Comments    sennosides 8.6 MG tablet   Commonly known as:  SENOKOT   This may have changed:    - when to take this  - reasons to take this   Used for:  Constipation, unspecified constipation type        Dose:  2 tablet   Take 2 tablets by mouth 2 times daily as needed for constipation   Quantity:  120 each   Refills:  0          CONTINUE these medications which have NOT CHANGED        Dose / Directions Comments    CENTRUM SILVER per  tablet        Dose:  1 tablet   Take 1 tablet by mouth daily   Quantity:  30 tablet   Refills:  0        ferrous sulfate 325 (65 FE) MG tablet   Commonly known as:  IRON        Dose:  325 mg   Take 325 mg by mouth 2 times daily   Refills:  0        labetalol 300 MG tablet   Commonly known as:  NORMODYNE   Used for:  Essential hypertension, CAD (coronary artery disease)        Dose:  300 mg   Take 1 tablet (300 mg) by mouth 2 times daily   Quantity:  180 tablet   Refills:  0        LIPITOR PO        Dose:  20 mg   Take 20 mg by mouth every evening   Refills:  0        polyethylene glycol powder   Commonly known as:  MIRALAX/GLYCOLAX        Dose:  17 g   Take 17 g by mouth daily as needed   Refills:  0        VITAMIN D (CHOLECALCIFEROL) PO        Dose:  2000 Units   Take 2,000 Units by mouth daily   Refills:  0                  Further instructions from your care team       [facility]  [address] [place] [address]  [phone]    Summary of Visit     Reason for your hospital stay       UTI, weakness             After Care     Activity - Up with nursing assistance           Advance Diet as Tolerated       Follow this diet upon discharge: Advance to a regular diet as tolerated       Fall precautions           General info for SNF       Length of Stay Estimate: Short Term Care: Estimated # of Days <30  Condition at Discharge: Stable  Level of care:skilled   Rehabilitation Potential: Fair  Admission H&P remains valid and up-to-date: Yes  Recent Chemotherapy: N/A  Use Nursing Home Standing Orders: Yes       Mantoux instructions       Give two-step Mantoux (PPD) Per Facility Policy Yes             Referrals     Occupational Therapy Adult Consult       Evaluate and treat as clinically indicated.    Reason:  Weakness, deconditioning       Physical Therapy Adult Consult       Evaluate and treat as clinically indicated.    Reason:  UTI, weakness             Your next 10 appointments already scheduled     [place]  09, 2017 12:00 PM   Level O with [facility]   [facility] ([facility])    [facility]  [address]  [facility] [address]   [phone]            Jan 09, 2017  1:00 PM   Return Visit with Earle Morgan MD   [facility] ([facility])    [facility]  [address]  [facility] [address]   [phone]            Mar 17, 2017  2:00 PM   Cystoscopy with Flip Porter MD, [facility] ([facility])    [address]  [address]  [facility] [address]   [phone]              Follow-Up Appointment Instructions     Future Labs/Procedures    Follow Up and recommended labs and tests     Comments:    Follow up with [facility] physician.  The following labs/tests are recommended: H&H in 4-5 days.      Follow-Up Appointment Instructions     Follow Up and recommended labs and tests       Follow up with [facility] physician.  The following labs/tests are recommended: H&H in 4-5 days.             Statement of Approval     Ordered          01/05/17 1203  I have reviewed and agree with all the recommendations and orders detailed in this document.   EFFECTIVE NOW     Approved and electronically signed by:  Julian Chambers MD

## 2017-01-02 NOTE — IP AVS SNAPSHOT
` `     [place] [address] ONCOLOGY: [phone]                 INTERAGENCY TRANSFER FORM - NOTES (H&P, Discharge Summary, Consults, Procedures, Therapies)   2017                    Hospital Admission Date: 2017  DAVID HERNANDEZ   : 1933  Sex: Male        Patient PCP Information     Provider PCP Type    Flip Prakash MD General         History & Physicals      Interval H&P Note by Na Griggs at 2016  3:01 PM     Author:  Na Griggs Service:  (none) Author Type:  HUC    Filed:  2016  3:01 PM Note Time:  2016  3:01 PM Related:  Original note: H&P (View-Only) by Flip Prakash MD filed at 2016  2:00 PM    Status:  Signed :  Na [place] [place]         This note is for the purpose of making the H&P performed in clinic within the last 30 days available in the hospital encounter.       H&P (View-Only) by Flip Prakash MD at 2016  1:19 PM     Author:  Flip Prakash MD Service:  (none) Author Type:  Physician    Filed:  2016  2:00 PM Note Time:  2016  1:19 PM Status:  Signed    :  Flip Prakash MD (Physician)             [address] [address]  [phone]  Dept: [phone]    PRE-OP EVALUATION:  Today's date: 2016    David Hernandez (: 1933) presents for pre-operative evaluation assessment as requested by Dr. Porter.  He requires evaluation and anesthesia risk assessment prior to undergoing surgery/procedure for treatment of bladder .  Proposed procedure: COMBINED CYSTOSCOPY, BIOPSY BLADDER    Date of Surgery/ Procedure: 16  Time of Surgery/ Procedure: 11 AM  Hospital/Surgical Facility: [facility]  Fax number for surgical facility:   Primary Physician: Flip Prakash  Type of Anesthesia Anticipated: General    Patient has a Health Care Directive or Living Will:   YES     1. YES - DO YOU HAVE A HISTORY OF HEART ATTACK, STROKE, STENT, BYPASS OR SURGERY ON AN ARTERY IN THE HEAD, NECK, HEART OR LEG? 5 STENTS  2. YES - DO YOU EVER HAVE ANY PAIN OR DISCOMFORT IN YOUR CHEST?   3. YES - DO YOU HAVE A HISTORY OF HEART FAILURE   4. YES - ARE YOUR TROUBLED BY SHORTNESS OF BREATH WHEN WALKING ON THE LEVEL, UP A SLIGHT HILL OR AT NIGHT?   5. NO - Do you currently have a cold, bronchitis or other respiratory infection?  6. NO - Do you have a cough, shortness of breath or wheezing?  7. YES - DO YOU SOMETIMES GET PAINS IN THE CALVES OF YOUR LEGS WHEN YOU WALK?   8. NO - Do you or anyone in your family have previous history of blood clots?  9. NO - Do you or does anyone in your family have a serious bleeding problem such as prolonged bleeding following surgeries or cuts?  10. YES - HAVE YOU EVER HAD PROBLEMS WITH ANEMIA OR BEEN TOLD TO TAKE IRON PILLS? Currently taking iron  11. YES - HAVE YOU HAD ANY ABNORMAL BLOOD LOSS SUCH AS BLACK, TARRY OR BLOODY STOOLS, OR ABNORMAL VAGINAL BLEEDING?   12. YES - HAVE YOU EVER HAD A BLOOD TRANSFUSION? In August 2016 before surgery for his leg  13. YES - HAVE YOU OR ANY OF YOUR RELATIVES EVER HAD PROBLEMS WITH ANESTHESIA? Personal history  14. NO - DO YOU HAVE SLEEP APNEA, EXCESSIVE SNORING OR DAYTIME DROWSINESS?   15. YES - DO YOU HAVE ANY PROSTHETIC HEART VALVES?   16. NO - Do you have prosthetic joints?  17. NO - Is there any chance that you may be pregnant?      HPI:                                                      Brief HPI related to upcoming procedure:       See problem list for active medical problems.  Problems all longstanding and stable, except as noted/documented.  See ROS for pertinent symptoms related to these conditions.                                                                                                  .    MEDICAL HISTORY:                                                      Patient Active Problem List    Diagnosis Date  Noted     Generalized muscle weakness 11/12/2016     Priority: Medium     Gross hematuria 10/31/2016     Priority: Medium     Thrombocytopenia (H) 10/25/2016     Priority: Medium     CKD (chronic kidney disease) stage 3, GFR 30-59 ml/min 10/16/2016     Priority: Medium     Essential hypertension with goal blood pressure less than 140/90 09/30/2016     Priority: Medium     Adjustment disorder with depressed mood 09/14/2016     Priority: Medium     Pancytopenia (H) 09/07/2016     Priority: Medium     Closed right hip fracture, with routine healing, subsequent encounter 08/28/2016     Priority: Medium     S/P ORIF (open reduction internal fixation) fracture 08/28/2016     Priority: Medium     Paroxysmal a-fib (H) 08/28/2016     Priority: Medium     Anemia due to blood loss, acute 08/28/2016     Priority: Medium     Malignant neoplasm of urinary bladder, unspecified site (H) 08/28/2016     Priority: Medium     Constipation, unspecified constipation type 08/28/2016     Priority: Medium     Pulmonary hypertension (H)      Priority: Medium     Snores      Priority: Medium     Pulmonary nodules      Priority: Medium     per PET Scan 4-23-16 = Several tiny, partially calcified pulmonary       Anemia      Priority: Medium     Closed right hip fracture (H) 08/17/2016     Priority: Medium     Fall 08/17/2016     Priority: Medium     Right his fracture        Urine frequency 03/30/2016     Priority: Medium     Bladder cancer (H) 01/04/2016     Priority: Medium     Coronary artery disease involving native coronary artery of native heart without angina pectoris 12/29/2015     Priority: Medium     S/P stents twice, 3 one time and 2 the other       Syncope and collapse 05/29/2015     Priority: Medium     Atrial fibrillation with RVR (H) 05/28/2015     Priority: Medium     Advance Care Planning 10/27/2014     Priority: Medium     Advance Care Planning:   Receipt of ACP document:  Received: Health Care Directive which was witnessed or  notarized on 2/19/04.  Document previously scanned on 3-27-14.  Order reviewed and found to be valid.  Code Status reflects choices in most recent ACP document.  Confirmed/documented designated decision maker(s). See permanent comments section of demographics in clinical tab. View document(s) and details by clicking on code status. Added by Ashley Bledsoe on 10/27/2014.         Late effects of CVA (cerebrovascular accident) 05/16/2014     Priority: Medium     Constipation 04/10/2014     Priority: Medium     Stroke (H) 03/25/2014     Priority: Medium     PVD (peripheral vascular disease) (H) 03/25/2014     Priority: Medium     Occlusion and stenosis of carotid artery with cerebral infarction 03/19/2014     Priority: Medium     Cerebral infarction (H) 03/11/2014     Priority: Medium     Diagnosis updated by automated process. Provider to review and confirm.       Infected sebaceous cyst 02/24/2014     Priority: Medium     Rhinorrhea 07/12/2013     Priority: Medium     Intermittent asthma 12/12/2012     Priority: Medium     Hyperlipidemia LDL goal <70 12/12/2012     Priority: Medium     Chronic constipation 12/12/2012     Priority: Medium     iamENTHESOPATHY OF HIP 08/16/2005     Priority: Medium      Past Medical History   Diagnosis Date     Coronary artery disease      Hypertension      Hyperlipidaemia      History of MI (myocardial infarction)      anterior     GERD (gastroesophageal reflux disease)      Hyperglycemia      Presbycusis      Osteoarthritis      Atrial fibrillation with RVR (H) 2014     prabably vagal episode exacerbated by the nitroglycerin administration,, with atrial fib     CVA (cerebral infarction)      left middle cerebral artery stroke, right upper extremity hemiparesis     Stented coronary artery      stents x 5     Bladder cancer (H)      Syncope and collapse      probable vaso vagal      Peripheral vascular disease (H)      Rhinorrhea      Chronic constipation      Difficult intubation       Intermittent asthma      no medicatons     Rectal carcinoma (H)      Hx of, without reoccurance     Pulmonary hypertension (H)      Snores      Pulmonary nodules      per PET Scan 4-23-16 = Several tiny, partially calcified pulmonary     Anemia      Fall 8-17-16     Right his fracture      Past Surgical History   Procedure Laterality Date     Vascular surgery  2003     2 stents     Vascular surgery  2008     3 stents     Hip surgery       L & R hip replacment     Endarterectomy carotid  3/15/2014     Procedure: ENDARTERECTOMY CAROTID;  INTERMAXILLARY FIXATION TO DISTRACT MANDIBLE FOR SURGICAL ACCESS TO CAROTID () LEFT CAROTID ENDARTERECTOMY WITH EEG (DR. SLADE)       Heart cath coronary angiogram w/lv gram  4-13-10     diffuse disease - possible future intervention on circumflex     Heart cath, angioplasty  4-4-08     Stenosis of the circumflex artery SALOME, Continued good stenting results of LAD,SALOME stenting X 2 RCA, continued apical LAD disease      Heart cath coronary angiogram w/lv gram  9-16-04     No significant restenosis of stented segments of LAD. APidcal stenosis 60%. 30% proximal circumflex, 60% RCA     Heart cath, angioplasty  10-13-03     DEX LAD X 2 , RCA 60 % stenosis     Open reduction internal fixation mandible  3/15/2014     Procedure: OPEN REDUCTION INTERNAL FIXATION MANDIBLE;;  Surgeon: Haseeb Monsivais DDS;  Location:  OR     Colonoscopy       Phacoemulsification clear cornea with standard intraocular lens implant Left 6/30/2015     Procedure: PHACOEMULSIFICATION CLEAR CORNEA WITH STANDARD INTRAOCULAR LENS IMPLANT;  Surgeon: Alexander Barksdale MD;  Location:  EC     Phacoemulsification clear cornea with standard intraocular lens implant Right 7/21/2015     Procedure: PHACOEMULSIFICATION CLEAR CORNEA WITH STANDARD INTRAOCULAR LENS IMPLANT;  Surgeon: Alexander Barksdale MD;  Location:  [facility]     Cystoscopy, transurethral resection (tur) tumor bladder, combined N/A 1/4/2016      Procedure: COMBINED CYSTOSCOPY, TRANSURETHRAL RESECTION (TUR) TUMOR BLADDER;  Surgeon: Flip Porter MD;  Location:  OR     Cystoscopy, retrogrades, combined Bilateral 1/4/2016     Procedure: COMBINED CYSTOSCOPY, RETROGRADES;  Surgeon: Flip Porter MD;  Location:  OR     Orthopedic surgery       ORIF MANDIBLE     Cystoscopy, transurethral resection (tur) tumor bladder, combined N/A 4/8/2016     Procedure: COMBINED CYSTOSCOPY, TRANSURETHRAL RESECTION (TUR) TUMOR BLADDER;  Surgeon: Flip Porter MD;  Location:  OR      left heart catheterization  2008     2 coronary stents in the right coronary artery and a stent in the marginal branch     Hc left heart catheterization  2010      75% stenosis proximal to his previously placed marginal branch stent but was not interested in intervention     Open reduction internal fixation femur proximal Right 8/19/2016     Procedure: OPEN REDUCTION INTERNAL FIXATION FEMUR PROXIMAL;  Surgeon: Obed Bledsoe MD;  Location:  OR     Arthroplasty revision hip Right 8/19/2016     Procedure: ARTHROPLASTY REVISION HIP;  Surgeon: Obed Bledsoe MD;  Location:  OR     Current Outpatient Prescriptions   Medication Sig Dispense Refill     sennosides (SENOKOT) 8.6 MG tablet Take 2 tablets by mouth 2 times daily       ferrous sulfate (IRON) 325 (65 FE) MG tablet Take 325 mg by mouth 2 times daily        polyethylene glycol (MIRALAX/GLYCOLAX) powder Take 17 g by mouth as needed        labetalol (NORMODYNE) 300 MG tablet Take 1 tablet (300 mg) by mouth 2 times daily 180 tablet 0     Atorvastatin Calcium (LIPITOR PO) Take 20 mg by mouth every evening        Multiple Vitamins-Minerals (CENTRUM SILVER) per tablet Take 1 tablet by mouth daily 30 tablet      VITAMIN D, CHOLECALCIFEROL, PO Take 2,000 Units by mouth daily        aspirin 81 MG tablet Take 81 mg by mouth daily       OTC products: None, except as noted above    No Known Allergies   Latex  "Allergy: NO    Social History   Substance Use Topics     Smoking status: Former Smoker -- 1.00 packs/day for 25 years     Types: Cigarettes, Pipe, Cigars     Quit date: 01/01/1975     Smokeless tobacco: Never Used     Alcohol Use: Yes      Comment: 1/day     History   Drug Use No       REVIEW OF SYSTEMS:                                                    C: NEGATIVE for fever, chills, change in weight  I: NEGATIVE for worrisome rashes, moles or lesions  E: NEGATIVE for vision changes or irritation  E/M: NEGATIVE for ear, mouth and throat problems  R: NEGATIVE for significant cough or SOB  B: NEGATIVE for masses, tenderness or discharge  CV: NEGATIVE for chest pain, palpitations or peripheral edema  GI: NEGATIVE for nausea, abdominal pain, heartburn, or change in bowel habits   male :positive for  M: NEGATIVE for significant arthralgias or myalgia  N: NEGATIVE for weakness, dizziness or paresthesias  E: NEGATIVE for temperature intolerance, skin/hair changes  H: NEGATIVE for bleeding problems  P: NEGATIVE for changes in mood or affect    EXAM:                                                    /60 mmHg  Pulse 70  Temp(Src) 98  F (36.7  C) (Tympanic)  Resp 18  Ht 5' 7\" (1.702 m)  Wt 167 lb (75.751 kg)  BMI 26.15 kg/m2  SpO2 98%    GENERAL APPEARANCE: healthy, alert and no distress     EYES: EOMI, - PERRL     HENT: ear canals and TM's normal and nose and mouth without ulcers or lesions     NECK: no adenopathy, no asymmetry, masses, or scars and thyroid normal to palpation     RESP: lungs clear to auscultation - no rales, rhonchi or wheezes     CV: regular rates and rhythm, normal S1 S2, no S3 or S4 and no murmur, click or rub -     ABDOMEN:  soft, nontender, no HSM or masses and bowel sounds normal     MS: extremities normal- no gross deformities noted, no evidence of inflammation in joints, FROM in all extremities.     SKIN: no suspicious lesions or rashes     NEURO: Normal strength and tone, sensory " exam grossly normal, mentation intact and speech normal     PSYCH: mentation appears normal. and affect normal/bright     LYMPHATICS: No axillary, cervical, inguinal, or supraclavicular nodes    DIAGNOSTICS:                                                        Recent Labs   Lab Test  10/31/16   1040 10/18/16 10/10/16   08/17/16   1738   04/08/16   0630   03/18/14   1006   05/18/10   0547   HGB  8.7*   --   8.8*   < >  9.2*   < >   --    < >   --    < >  12.5*   PLT  269   --   222   < >  156   < >   --    < >   --    < >   --    INR   --    --    --    --   1.12   --    --    --    --    --   1.02   NA  138  141  141  140   < >  142   < >   --    < >   --    < >  144   POTASSIUM  4.6  4.2  4.2  4.2   < >  4.3   < >   --    < >   --    < >  4.3   CR  1.47*  1.44*  1.35*  1.31*   < >  1.49*   < >   --    < >   --    < >  1.08   A1C   --    --    --    --    --    --   5.9   --   6.2*   < >   --     < > = values in this interval not displayed.        IMPRESSION:                                                    Reason for surgery/procedure: gross hematuria secondary to bladder cancer    The proposed surgical procedure is considered INTERMEDIATE risk.    REVISED CARDIAC RISK INDEX  The patient has the following serious cardiovascular risks for perioperative complications such as (MI, PE, VFib and 3  AV Block):  Coronary Artery Disease (MI, positive stress test, angina, Qs on EKG)  INTERPRETATION: 1 risks: Class II (low risk - 0.9% complication rate)    The patient has the following additional risks for perioperative complications:  No identified additional risks      ICD-10-CM    1. Preop general physical exam Z01.818 CBC with platelets differential     Electrolyte panel (Na, K, Cl, CO2, Anion gap)     Creatinine     Urea nitrogen     CANCELED: UA with Microscopic   2. Malignant neoplasm of urinary bladder, unspecified site (H) C67.9    3. Anemia due to blood loss, acute D62    4. Paroxysmal a-fib (H) I48.0    5.  Essential hypertension with goal blood pressure less than 140/90 I10    6. CKD (chronic kidney disease) stage 3, GFR 30-59 ml/min N18.3    7. Late effects of CVA (cerebrovascular accident) I69.90    8. Cerebrovascular accident (CVA), unspecified mechanism (H) I63.9        RECOMMENDATIONS:                                                              APPROVAL GIVEN to proceed with proposed procedure, without further diagnostic evaluation       Signed Electronically by: Flip Prakash MD    Copy of this evaluation report is provided to requesting physician.    [facility] Pre Guidelines                Discharge Summaries      Discharge Summaries by Julian Chambers MD at 1/5/2017 12:03 PM     Author:  Julian Chambers MD Service:  Hospitalist Author Type:  Physician    Filed:  1/5/2017 12:12 PM Note Time:  1/5/2017 12:03 PM Status:  Signed    :  Julian Chambers MD (Physician)           [facility]    Discharge Summary  Hospitalist    Date of Admission:  1/2/2017  Date of Discharge:  1/5/2017  Discharging Provider: Julian Chambers  Date of Service (when I saw the patient): 01/05/2017    Discharge Diagnoses  UTI  Generalized weakness  HTN  Dyslipidemia  CAD  Paroxysmal A-fib  Chronic systolic CHF  CKD stage III  Bladder cancer  Chronic anemia  Constipation     History of Present Illness  Brian Hernandez is an 83 year old male who presented with generalized weakness, found to have UTI.    Hospital Course  Brian Hernandez was admitted on 1/2/2017.  The following problems were addressed during his hospitalization:    Proteus UTI:  He had a recent cystoscopy with transurethral resection of bladder cancer on 12/10/2016.  Wilkins catheter was kept in place for one week, and removed, on 12/19/2016.  UA on admission grossly abnormal, culture grew proteus resistant to Macrobid only.  Treated with ceftriaxone, will transition to ciprofloxacin at discharge to complete a 2-week course of antibiotic.  Follow up  with PCP to consider suppression therapy given prior UTIs and his bladder cancer with procedure.    Generalized weakness:  Suspected multifactorial- bladder cancer, anemia, ABILIO, UTI, but current UTI contributing to worsening weakness.  PT/OT recommend TCU.     Hypertension:  Continue PTA labetalol 300 mg p.o. twice daily.      Dyslipidemia:  Admission CK wnl.  Continue PTA daily Lipitor 20 mg p.o.     Coronary artery disease, status post multiple stents placed in the past:  This does not seem to be an acute issue at this time.  He is not on aspirin.  Continue with his prior to admission beta blocker.    Chronic systolic CHF:  Echocardiogram in 05/2015 showing ejection fraction of 45-50%.  No signs of exacerbation.     Paroxysmal atrial fibrillation:  Now off anticoagulation because he developed hemoptysis in the past.      Chronic kidney disease, stage III:  Most recent creatinine varied between 1.2 and 1.4.  Currently stable.      Bladder cancer:  Status post multiple cystoscopies with transurethral resections of large bladder tumors, with last one on 12/10/2016 by Dr. Porter:  He is also status post chemotherapy and radiation therapy.   Urology consulted, recommend follow up as scheduled in March for cystoscopy monitoring.  Note patient reports he is tired of this - unclear if he truly wishes to stop monitoring and pursue a more comfort based approach.  Encouraged him to discuss with his wife and PCP.      Chronic anemia:  Iron studies consistent with anemia of chronic disease.  Likely due to ongoing illness and urological procedure, hematuria.  Iron supplement increased to bid and placed on folic acid this admission.    Constipation.  Continue PTA regimen.     Julian Chambers    Significant Results and Procedures  See below    Pending Results  These results will be followed up by: Hospitalist service  Unresulted Labs Ordered in the Past 30 Days of this Admission     Date and Time Order Name Status Description     1/3/2017 0740 Vitamin B12 In process           Code Status  DNR / DNI       Primary Care Physician  Flip Prakash    Constitutional: Well developed, well nourished, elderly male in no acute distress  Respiratory: Clear to auscultation bilaterally, no crackles or wheezes  Cardiovascular: Regular rate and rhythm, S1/S2 without murmur, rubs or gallops  GI: Abdomen soft, non-tender, non-distended, normal bowel sounds  Lymph/Hematologic: No edema  Musculoskeletal: Extremities warm and well perfused  Neurologic: Cranial nerves grossly intact, gross motor movements intact, alert and appropriate  Psychiatric: normal affect    Discharge Disposition  Discharged to nursing home  Condition at discharge: Stable    Consultations This Hospital Stay  SOCIAL WORK IP CONSULT  PHYSICAL THERAPY ADULT IP CONSULT  OCCUPATIONAL THERAPY ADULT IP CONSULT  UROLOGY IP CONSULT  PHYSICAL THERAPY ADULT IP CONSULT  OCCUPATIONAL THERAPY ADULT IP CONSULT    Time Spent on This Encounter  IJulian, personally saw the patient today and spent greater than 30 minutes discharging this patient.    Discharge Orders    General info for SNF   Length of Stay Estimate: Short Term Care: Estimated # of Days <30  Condition at Discharge: Stable  Level of care:skilled   Rehabilitation Potential: Fair  Admission H&P remains valid and up-to-date: Yes  Recent Chemotherapy: N/A  Use Nursing Home Standing Orders: Yes     Mantoux instructions   Give two-step Mantoux (PPD) Per Facility Policy Yes     Reason for your hospital stay   UTI, weakness     Activity - Up with nursing assistance     Follow Up and recommended labs and tests   Follow up with jail physician.  The following labs/tests are recommended: H&H in 4-5 days.     DNR/DNI     Physical Therapy Adult Consult   Evaluate and treat as clinically indicated.    Reason:  UTI, weakness     Occupational Therapy Adult Consult   Evaluate and treat as clinically indicated.    Reason:  Weakness,  deconditioning     Fall precautions     Advance Diet as Tolerated   Follow this diet upon discharge: Advance to a regular diet as tolerated       Discharge Medications  Current Discharge Medication List      START taking these medications    Details   folic acid (FOLVITE) 1 MG tablet Take 1 tablet (1 mg) by mouth daily  Qty: 30 tablet    Associated Diagnoses: Iron deficiency anemia due to chronic blood loss      cefUROXime (CEFTIN) 500 MG tablet Take 1 tablet (500 mg) by mouth 2 times daily for 11 days  Qty: 22 tablet, Refills: 0    Associated Diagnoses: Urinary tract infection due to Proteus         CONTINUE these medications which have CHANGED    Details   sennosides (SENOKOT) 8.6 MG tablet Take 2 tablets by mouth 2 times daily as needed for constipation  Qty: 120 each    Associated Diagnoses: Constipation, unspecified constipation type         CONTINUE these medications which have NOT CHANGED    Details   ferrous sulfate (IRON) 325 (65 FE) MG tablet Take 325 mg by mouth 2 times daily       polyethylene glycol (MIRALAX/GLYCOLAX) powder Take 17 g by mouth daily as needed       labetalol (NORMODYNE) 300 MG tablet Take 1 tablet (300 mg) by mouth 2 times daily  Qty: 180 tablet, Refills: 0    Associated Diagnoses: Essential hypertension; CAD (coronary artery disease)      Atorvastatin Calcium (LIPITOR PO) Take 20 mg by mouth every evening       Multiple Vitamins-Minerals (CENTRUM SILVER) per tablet Take 1 tablet by mouth daily  Qty: 30 tablet      VITAMIN D, CHOLECALCIFEROL, PO Take 2,000 Units by mouth daily            Allergies  No Known Allergies  Data  Most Recent 3 CBC's:  Recent Labs   Lab Test  01/04/17 0730 01/03/17   1545  01/03/17   0740  01/02/17   1911 12/16/16   1115   WBC   --    --   9.9  9.9  7.7   HGB  9.0*  8.8*  8.8*  9.3*  10.6*   MCV   --    --   95  96  98   PLT   --    --   258  279  270      Most Recent 3 BMP's:  Recent Labs   Lab Test  01/04/17 0730 01/03/17   0740  01/02/17 1911   NA   142  142  139   POTASSIUM  3.7  3.8  3.8   CHLORIDE  109  107  104   CO2  25  25  24   BUN  17  24  32*   CR  1.18  1.31*  1.37*   ANIONGAP  8  10  11   TOR  8.5  8.3*  8.8   GLC  117*  109*  120*     Most Recent 2 LFT's:  Recent Labs   Lab Test  01/02/17   1911  10/31/16   1040   AST  27  13   ALT  39  18   ALKPHOS  78  97   BILITOTAL  0.4  0.3     Most Recent 6 Bacteria Isolates From Any Culture (See EPIC Reports for Culture Details):  Recent Labs   Lab Test  01/02/17   2011  10/31/16   1300  11/14/15   1116   CULT  >100,000 colonies/mL Proteus mirabilis*  <10,000 colonies/mL urogenital andi  <10,000 colonies/mL urogenital andi Susceptibility testing not routinely done       Results for orders placed or performed during the hospital encounter of 11/01/16   CT Chest/Abdomen/Pelvis w Contrast    Narrative    CT CHEST/ABDOMEN/PELVIS WITH CONTRAST 11/1/2016 5:20 PM    HISTORY: Follow-up bladder cancer.    TECHNIQUE: Scans were obtained from the lung apices through the pelvis  with IV contrast. Radiation dose for this scan was reduced using  automated exposure control, adjustment of the mA and/or kV according  to patient size, or iterative reconstruction technique.    CONTRAST GIVEN: 82mL Isovue-370.    COMPARISON: CT abdomen 12/11/2015.    FINDINGS:  Chest: No mediastinal or hilar adenopathy or mass. Coronary  calcifications with calcification of the thoracic aorta. Linear  atelectasis or fibrosis both lung bases. No evidence for pulmonary  metastases.    Abdomen and pelvis: Splenic granulomata. Liver, spleen, and pancreas  are otherwise normal. Calcification of the abdominal aorta with  arterial calcifications in both kidneys. Both kidneys are otherwise  normal. Irregularity to the wall of the bladder with irregular  enhancement involving the wall of the bladder. This is worrisome for  recurrent bladder neoplasm. Bilateral total hip arthroplasties which  obscure detail in the pelvis. Scattered diverticula in the  sigmoid  colon without evidence of diverticulitis. Colon and small bowel are  otherwise normal. Appendix is normal. No abdominal or pelvic  adenopathy. No worrisome skeletal abnormality identified. Remainder of  the scan is negative.      Impression    IMPRESSION:   1. Irregularity to the wall of the bladder with irregular enhancement  of the wall of the bladder. Findings are worrisome for recurrent  bladder neoplasm.  2. No definite evidence for metastatic disease.  3. Vascular calcifications.  4. Scattered diverticula in the sigmoid colon without evidence of  diverticulitis.    EVETTE JOE MD                                Consult Notes     No notes of this type exist for this encounter.         Progress Notes - Physician (Notes from 01/02/17 through 01/05/17)      Progress Notes by Elle Barros LSW at 1/5/2017 11:21 AM     Author:  Elle Barros LSW Service:  (none) Author Type:      Filed:  1/5/2017 11:24 AM Note Time:  1/5/2017 11:21 AM Status:  Signed    :  Elle Barros LSW ()           DORIAN  I: SW was updated by [facility] that patient has a bed at their facility. DORIAN updated patient's spouse and she is okay with this plan. Patient's wife would prefer SW arrange transport for patient through  and is grant with the fees associated with transport. DORIAN called  and arranged w/c ride for 1530. CC text paged MD. DORIAN will fax orders/PAS/Scripts when complete. DORIAN updated patient's spouse and facility on transport time.    P: SW will continue to follow and assist as needed.    DIMITRIOS Diamond   *[phone]         Progress Notes by Agueda Garcia MD at 1/4/2017  8:38 PM     Author:  Agueda Garcia MD Service:  Hospitalist Author Type:  Physician    Filed:  1/4/2017  8:40 PM Note Time:  1/4/2017  8:38 PM Status:  Signed    :  Agueda Garcia MD (Physician)           X coverage: called by the nurse because pt lost iv access ad refuses to  have another iv placed; he is here for UTI with E coli; as per the notes- plan to d/c on oral Cipro; plan to d/c tomorrow/; will switch now from iv Ceftriaxone to po Cipro.    Adelita Garcia MD       Progress Notes by Elle Barros LSW at 1/4/2017 12:33 PM     Author:  Elle Barros LSW Service:  (none) Author Type:      Filed:  1/4/2017  3:52 PM Note Time:  1/4/2017 12:33 PM Status:  Addendum    :  Elle Barros LSW ()      Related Notes: Original Note by Elle Barros LSW () filed at 1/4/2017  3:29 PM         Care Transition Initial Assessment - SW     Met with: PATIENT,SPOUSE    Active Problems:    UTI (urinary tract infection)         DATA  Lives With: spouse  Living Arrangements: house  Identified issues/concerns regarding health management: Patient will need help with d/c plans   Patient feels that they have adequate support @ home? YES  Transportation Available: family or friend will provide (Sons provide transportation)    ASSESSMENT  Cognitive Status: Alert and oriented  Concerns to be addressed: Patient is a 83 year old male who was admitted to the hospital for UTI. Prior to hospitalization patient was living at home with spouse where he was managing well. Patient is aware that he may have some d./c needs when pt gets ready to d/c from hospital. Sw called patient's spouse to discuss private pay TCU as OT has deferred patient to d/c to TCU and MD feels patient is ready to d/c today. SW explained that patient would need to private pay due to not meeting 3 day inpatient stay. SW explained most facilities require $3,000-$5,000 upon admission. Patient spouse feels she is being rushed out and will not give any choices until spouse meets with MD and patient. Spouse is on her way up to hospital. Kelin BLANKENSHIP, called PT to discuss their recommendation. PT stated they did not put their note in but are recommending home with Assist of spouse and Home  PT. Sw will follow up once patient's spouse arrives at hospital.     PLAN  Patient Goals and Preferences: TCU vs. Home with HC PT  Patient anticipates discharging to:  TCU vs. Home with HC PT      ADDENDUM  I: SW met with patient, spouse and son to discuss d/c planning needs. Patient and family do not feel patient is safe to d/c home and will need TCU placement at d/c. SW explained that if patient d/c's today it will be private pay. MD feels patient is appropriate to d/c pending bed availability. Patient's spouse would like a referral sent to facilities in [facility] and expressed interest in [facility]. SW will send referral via [facility]. SW will update patient and family once bed is confirmed at one the preferred facilities.    ADDENDUM  I: [facility] from [facility] has no beds available.    Elle Barros, [facility]   *[phone]             Progress Notes by Papo Ochoa MD at 1/4/2017  3:38 PM     Author:  Papo Ochoa MD Service:  Hospitalist Author Type:  Physician    Filed:  1/4/2017  3:50 PM Note Time:  1/4/2017  3:38 PM Status:  Signed    :  Papo Ochoa MD (Physician)           [facility]    Hospitalist Progress Note    Date of Service (when I saw the patient): 01/04/2017    Assessment and Plan    Mr. 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        1.  Proteus UTI:  He had a recent cystoscopy with transurethral resection of bladder cancer on 12/10/2016.  Wilkins catheter was kept in place for one week, and removed, on 12/19/2016.  He was advised to come to ER in now with generalized worsening weakness, which was suspected due to UTI on admission  - UA on admission -grossly abnormal, culture grew proteus which is pansensitive.   - started on ceftriaxone 1 gram IV in ER continued.   - Afebrile (Tmax 99.8), WBC count normal. Taking PO. Vitals stable. Saline lock IV.    - He will need 2 weeks course of abx, cipro or ceftin, then follow up with PCP prior to that to see possibly suppression therapy given prior UTIs and his bladder cancer with procedure.    2.  Generalized weakness:  Suspected multifactorial- bladder cancer, anemia, ABILIO, UTI, but current UTI contributing to worsening weakness.   - Treat UTI as per #1.    - Creatinine improved to baseline  - PT and OT to assess the patient, consult for Social Work, as I anticipate that the patient may need a TCU at the time of discharge.      3.  History of hypertension:  His blood pressure for now seems to be well controlled.  We will continue with his prior to admission labetalol 300 mg p.o. twice daily.      4.  History of dyslipidemia:  Hold PTA daily Lipitor 20 mg p.o.   -  CK normal.    5.  History of coronary artery disease, status post multiple stents placed in the past:  This does not seem to be an acute issue at this time.  He is not on aspirin.  - continue with his prior to admission beta blocker.    6.  History of bladder cancer, status post multiple cystoscopies with transurethral resections of large bladder tumors, with last one on 12/10/2016 by Dr. Porter:  He is also status post chemotherapy and radiation therapy.   - Urology consulted given reported blood in urine which has improved, and Hb is stable.  -  Urology recommending outpatient follow up, next cystoscopy scheduled in march.     7.  History of paroxysmal atrial fibrillation:  I think he was on Eliquis at some point in the past, now off anticoagulation because he developed hemoptysis in the past.      8.  Chronic kidney disease, stage III:  Most recent creatinine varied between 1.2 and 1.4.  Currently his creatinine is 1.37, around his baseline.  We will closely monitor his kidney function.  We will avoid nephrotoxic drugs.      9.  Ischemic systolic cardiomyopathy, with echocardiogram in 05/2015 showing ejection fraction of 45-50%:  He seems euvolemic at this time.  He is not on any diuretics at home.  We will mildly hydrate him with normal saline at 50 cc per hour, and we will closely monitor his fluid status since it seems that during his admission in 08/2016, he developed respiratory distress due to volume overload.      10. Anemia_ chronic likely due to ongoing illness and urological procedure, hematuria.  - initial slight drop is likely dilutional  - iron panel s/o AOCD  - on iron supplement, increase to BID, add Folic acid   - had dark stool, likely due to iron, guaiac ordered - no need as Hb stable.    11. History of constipation.  Had issues with constipation needing visit to ER after urological procedure, was on scheduled Senna, and MiraLax p.r.n.  had loose stool yesterday so scheduled senna stopped. Wife very concerned given his history, will made these available as PRN.     DVT Prophylaxis: Pneumatic Compression Devices  Code Status: DNR/DNI    Disposition: Expected discharge: medically stable for discharge. Therapy recommending Home PT OT but family requesting TCU given weakness, and care needed at home.   - discussed with SW, working on TCU, discharge when bed available at TCU.  - Discussed at length with patient, his wife and son at bedside.     Papo Ochoa MD  hospitalist    Interval History  Patient was seen and examined, denies pain.  C/o fatigue, ongoing for weeks.  - no dyspnea. Had blood in urine improved. Denies abd pain.  - no nausea or vomiting.  -Afebrile.    -Data reviewed today: I reviewed all new labs and imaging results over the last 24 hours. I personally reviewed no images or EKG's today.    Physical Exam  Temp: 98.7  F (37.1  C) Temp src: Oral BP: 112/49 mmHg   Heart Rate: 85 Resp: 16 SpO2: 96 % O2 Device: None (Room air)    Filed Vitals:    01/02/17 2247 01/03/17 0614 01/04/17 0455   Weight: 72 kg (158 lb 11.7 oz) 72 kg (158 lb 11.7 oz) 72.848 kg (160 lb 9.6 oz)     Vital Signs with Ranges  Temp:  [98.7  F (37.1  C)-99.8  F (37.7  C)] 98.7  F (37.1  C)  Heart Rate:  [72-85] 85  Resp:  [16] 16  BP: (112-159)/(49-70) 112/49 mmHg  SpO2:  [91 %-96 %] 96 %  I/O last 3 completed shifts:  In: 670 [P.O.:670]  Out: -     Constitutional: Alert, awake. Not in distress.   HEENT: PERRLA, EOMI, mucosa moist  Respiratory: Bilateral equal air entry, clear to auscultation. No respiratory distress.  Cardiovascular: Regular s1s2, no murmur, rub or gallop. No tachycardia.  GI: Soft, non distended, non tender, bowel tones active.  Skin/Integumen: No rash, no blister.    Medications       ferrous sulfate  325 mg Oral BID     labetalol  300 mg Oral BID     cefTRIAXone  1 g Intravenous Q24H     sodium chloride (PF)  3 mL Intracatheter Q8H       Data    Recent Labs  Lab 01/04/17  0730 01/03/17  1545 01/03/17  0740 01/02/17  1911   WBC  --   --  9.9 9.9   HGB 9.0* 8.8* 8.8* 9.3*   MCV  --   --  95 96   PLT  --   --  258 279     --  142 139   POTASSIUM 3.7  --  3.8 3.8   CHLORIDE 109  --  107 104   CO2 25  --  25 24   BUN 17  --  24 32*   CR 1.18  --  1.31* 1.37*   ANIONGAP 8  --  10 11   TOR 8.5  --  8.3* 8.8   *  --  109* 120*   ALBUMIN  --   --   --  2.7*   PROTTOTAL  --   --   --  7.1   BILITOTAL  --   --   --  0.4   ALKPHOS  --   --   --  78   ALT  --   --   --  39   AST  --   --   --  27       No results found for this or any previous  visit (from the past 24 hour(s)).         Progress Notes by Sveta Loo PT at 1/4/2017 12:35 PM     Author:  Sveta Loo PT Service:  (none) Author Type:  Physical Therapist    Filed:  1/4/2017 12:35 PM Note Time:  1/4/2017 12:35 PM Status:  Signed    :  Sveta Loo PT (Physical Therapist)              01/04/17 0800   Quick Adds   Type of Visit Initial PT Evaluation   Living Environment   Lives With spouse   Living Arrangements house   Home Accessibility tub/shower is not walk in   Number of Stairs to Enter Home 2  (2 rail)   Number of Stairs Within Home 14  (one rail)   Transportation Available family or friend will provide  (Sons provide transportation)   Self-Care   Usual Activity Tolerance moderate   Equipment Currently Used at Home walker, rolling  (tub bench)   Functional Level Prior   Ambulation 1-->assistive equipment  (FWW)   Transferring 1-->assistive equipment   Toileting 2-->assistive person  (occasional assist from wife)   Bathing 3-->assistive equipment and person  (tub bench and wife)   Dressing 2-->assistive person  (Assistance from wife sometimes)   Fall history within last six months yes   Number of times patient has fallen within last six months 1  (Patient reports he fell in August and needed a rehab stay)   Which of the above functional risks had a recent onset or change? ambulation;transferring   Prior Functional Level Comment Patient reports he is modified independent with mobility with a FWW. He reports he needs assist with ADL's. R UE coorindation/strength impaired from previous stroke.    General Information   Onset of Illness/Injury or Date of Surgery - Date 01/02/17   Referring Physician MD Jose   Patient/Family Goals Statement Return home   Pertinent History of Current Problem (include personal factors and/or comorbidities that impact the POC) 83 year old male admitted with weakness and found to have a UTI. PMH significant for HTN. Pt also reports a previous stroke  about 2-3 years prior affecting the R UE/LE.    Precautions/Limitations fall precautions   General Info Comments Activity: ambulate with assist   Cognitive Status Examination   Orientation orientation to person, place and time   Level of Consciousness alert   Follows Commands and Answers Questions 100% of the time   Pain Assessment   Patient Currently in Pain (L sided abdominal pain)   Posture    Posture Not impaired   Range of Motion (ROM)   ROM Comment Decreased R UE AROM d/t previous stroke - pt does use this hand with functional mobility.    Strength   Strength Comments Sufficient for mobility as noted below.    Bed Mobility   Bed Mobility Comments Supine to sit with SBA.    Transfer Skills   Transfer Comments Sit to/from stand with CGA (performs from bed, standard height chair and toilet).    Gait   Gait Comments Ambulates 300 feet x 2 reps with FWW and CGA progressing to SBA. Decreased gait speed. Pt reports weakness compared to baseline. No LOB.    Balance   Balance Comments Requires bilateral UE supporton FWW for safe dynamic mobility.    Coordination   Coordination (R UE appears to have some coorindation deficits. )   General Therapy Interventions   Planned Therapy Interventions gait training;strengthening;transfer training;home program guidelines;progressive activity/exercise   Clinical Impression   Criteria for Skilled Therapeutic Intervention yes, treatment indicated   PT Diagnosis Generalized weakness   Influenced by the following impairments Decreased LE strength, decreased activity tolerance   Functional limitations due to impairments Decreased independence with gait/ADL's   Clinical Presentation Stable/Uncomplicated   Clinical Presentation Rationale Patient profile currently appears stable.    Clinical Decision Making (Complexity) Low complexity   Therapy Frequency` daily   Predicted Duration of Therapy Intervention (days/wks) 3 days   Anticipated Discharge Disposition Home with Home Therapy;Home  "with Assist;Transitional Care Facility   Risk & Benefits of therapy have been explained Yes   Patient, Family & other staff in agreement with plan of care Yes   Clinical Impression Comments Patient appropriate for continued acute care PT to address strength deficits to maximize independence with functional mobility.    [facility] AM-PAC TM \"6 Clicks\"   2016, Trustees of [facility], under license to [employer].  All rights reserved.   6 Clicks Short Forms Basic Mobility Inpatient Short Form   [facility] AM-PAC  \"6 Clicks\" V.2 Basic Mobility Inpatient Short Form   1. Turning from your back to your side while in a flat bed without using bedrails? 4 - None   2. Moving from lying on your back to sitting on the side of a flat bed without using bedrails? 4 - None   3. Moving to and from a bed to a chair (including a wheelchair)? 3 - A Little   4. Standing up from a chair using your arms (e.g., wheelchair, or bedside chair)? 3 - A Little   5. To walk in hospital room? 3 - A Little   6. Climbing 3-5 steps with a railing? 3 - A Little   Basic Mobility Raw Score (Score out of 24.Lower scores equate to lower levels of function) 20   Total Evaluation Time   Total Evaluation Time (Minutes) 10          Progress Notes by Kelin East RN at 1/4/2017 12:30 PM     Author:  Kelin East RN Service:  Care Coordinator Author Type:      Filed:  1/4/2017 12:31 PM Note Time:  1/4/2017 12:30 PM Status:  Signed    :  Kelin East RN ()           Paged PT to come and consult the patient per MD patient is ready to d/c.  Would like recommendations on safe d/c disposition.        ED Provider Notes by Trierweiler, Chad A, MD at 1/2/2017  6:06 PM     Author:  Trierweiler, Chad A, MD Service:  Emergency Medicine Author Type:  Physician    Filed:  1/3/2017 10:43 AM Note Time:  1/2/2017  6:06 PM Status:  Signed    :  Trierweiler, Chad A, MD (Physician)             History     Chief " Complaint:  Generalized Weakness      HPI   Brian Hernandez is a 83 year old male with history of bladder cancer, CAD, hypertension, hyperlipidemia, CVA, atrial fibrillation who presents to the emergency department today with generalized weakness. In August of 2016 the patient fractured his hip and he underwent a total hip arthroplasty and femur ORIF and he was then in at TCU for 77 days and following this he has had residual weakness. On 12/09/16 the patient had a cystoscopy and transurethral resection tumor bladder performed secondary to bladder cancer by Dr. Porter. Today, the patient states that he feels fatigued and tired. His family states that the patient has be progressive weakness and fatigue with shortness of breath with exertion since being discharged. His family states that on Friday 12/30/16 the patient had at home physical therapy and the therapist ceased therapy because the patient was too weak. His son states that the patient is able to ambulate to the bathroom and is able to walk up 6 steps. The has had difficulty getting to the bathroom has he is incontinent of urine and during the night he frequently gets up to use the bathroom.Today, the patient's wife states that she called the patient's primary care provider who recommended that he come to the ED. His wife notes that he has lost weight and his PO intake has been decreased. He denies fevers or chills. Denies nausea or vomiting. Denies cough.     Allergies:  No Known Drug Allergies      Medications:    Ditropan   Senokot   Normodyne   Lipitor      Past Medical History:    CAD  Hypertension   Hyperlipidemia   MI   GERD  Presbycusis   Atrial fibrillation   CVA  Bladder cancer   Peripheral vascular disease   Chronic constipation   Intermittent asthma   Rectal carcinoma   Pulmonary hypertension   Pulmonary nodules   Anema    Past Surgical History:     Vascular surgery, multiple stents  SUJIT bilateral  Endarterectomy carotid  Heart cath coronary  "angiogram x4   ORIF mandible  ORIF femur   Revision SUJIT 08/16  Arthroplasty   Cystoscopy, transurethral resection tumor bladder, combined - 12/09/16    Family History:    History reviewed. No pertinent family history.      Social History:  The patient was accompanied to the ED by wife and sons.  Smoking Status: Former smoker -- 1.00 packs/day 1.00 packs.day for 25 years - 01/01/1975  Smokeless Tobacco: Never used  Alcohol Use: Yes     Marital Status:        Review of Systems   Constitutional: Positive for fatigue. Negative for fever and chills.   Respiratory: Negative for cough.    Gastrointestinal: Negative for nausea and vomiting.   All other systems reviewed and are negative.    Physical Exam   First Vitals:  BP: 124/62 mmHg  Pulse: 84  Temp: 99.7  F (37.6  C)  Resp: 16  Height: 170.2 cm (5' 7\")  SpO2: 96 %    Physical Exam  General:  Elderly frail man resting comfortably on the bed.      Eye:  Pupils are equal, round, and reactive.  Extraocular movements intact.    ENT:  No rhinorrhea.  Moist mucus membranes.  Normal tongue and tonsil.    Cardiac:  Regular rate and rhythm.  No murmurs, gallops, or rubs.    Pulmonary:  Clear to auscultation bilaterally.  No wheezes, rales, or rhonchi.    Abdomen:  Positive bowel sounds.  Abdomen is soft and non-distended, without focal tenderness.    Musculoskeletal:  Normal movement of all extremities without evidence for deficit.    Skin:  Warm and dry without rashes.    Neurologic:  Non-focal exam without asymmetric weakness or numbness.    Psychiatric:  Normal affect with appropriate interaction with examiner.      Emergency Department Course       Laboratory:  Laboratory findings were communicated with the patient and family who voiced understanding of the findings.    CBC:WBC 9.9, HGB 9.3 (L),    CMP: Glucose 128 (H), BUN 32 (H), Creatinine 1.37 (H), GFR 50 (L), Albumin 2.7 (L)   Type and screen: ABO O, Rh Pos, Antibody screen Neg     UA: Blood Large (A), pH " 8.5 (H), Protein albumin >-300 (A), Nitrite Positive (A), Leukocyte esterase Small (A), WBC 25-50 (A), RBC 5-10 (A), Bacteria Moderate (A), Triple phosphates Moderate (A) o/w Negative   Urine culture aerobic bacteria: Pending       Interventions:  1910 NS 1000 mL IV    2110 Rocephin 1 g IV      Emergency Department Course:  Nursing notes and vitals reviewed.  I performed an exam of the patient as documented above.   IV was inserted and blood was drawn for laboratory testing, results above.   The patient provided a urine sample here in the emergency department. This was sent for laboratory testing, findings above.   2116: I spoke with Dr. Garcia of the hospitalist service regarding patient's presentation, findings, and plan of care.   I discussed the treatment plan with the patient. They expressed understanding of this plan and consented to admission. I discussed the patient with Dr. Garcia, who will admit the patient to a monitored bed for further evaluation and treatment.     Impression & Plan      Medical Decision Making:  Brian Hernanedz is a 83 year old male wit a history of bladder cancer who presents to the emergency department today with progressive weakness over the last several weeks. He also describes having urinary frequency and having to get up many times during the night. His physical exam is unremarkable except for his deconditioned state. His blood work is reassuring, all essentially at baseline. However, his urineanalysis is positive for infection and I have to wonder if this is the leading cause for his malaise and weakness. The family notes that he is not doing well at home and he will require placement. I will admitted the patient for his UTI, treated with Rocephin and with further social work evaluation and plan for rehab stay after he is stabilized. I spoke with Dr. Garcia of the hospitalist service who agrees to accept car of the patient.     Diagnosis:    ICD-10-CM    1. Urinary tract  infection, site unspecified N39.0 Urine Culture Aerobic Bacterial   2. Generalized muscle weakness M62.81       Scribe Disclosure:  I, Pepito Garcia, am serving as a scribe at 6:06 PM on 1/2/2017 to document services personally performed by Trierweiler, Chad A, MD, based on my observations and the provider's statements to me.   1/2/2017    EMERGENCY DEPARTMENT        Trierweiler, Chad A, MD  01/03/17 1043       Progress Notes by Papo Ochoa MD at 1/3/2017  8:35 AM     Author:  Papo Ochoa MD Service:  Hospitalist Author Type:  Physician    Filed:  1/3/2017  9:02 AM Note Time:  1/3/2017  8:35 AM Status:  Signed    :  Papo Ochoa MD (Physician)           [facility]    Hospitalist Progress Note    Date of Service (when I saw the patient): 01/03/2017    Assessment and Plan    Mr. 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        1.  Urinary tract infection:  He had a recent cystoscopy with transurethral resection of bladder cancer on 12/10/2016.  Wilkins catheter was kept in place for one week, and removed, on 12/19/2016.  He was advised to come to ER in now with generalized worsening weakness, suspected due to urinary tract infection.   - Urinalysis on admission -grossly abnormal  with white blood cells 25-50, large blood, positive nitrites, small leukocyte esterase, moderate bacteria. But he does not have fever and no leukocytosis.  - He received one dose of ceftriaxone in the ER.   - Continue ceftriaxone 1 gram IV daily for now and follow up urine culture.   - Gently hydrate him with normal saline at 50 cc per hour for now, and closely monitor his fluid status.      2.  Generalized weakness:  Suspected multifactorial, but current urinary tract infection likely contributing to worsening weakness. Treat UTI as per #1.    - Gentle IV hydration.    - PT and OT to assess the patient, consult for Social Work, as I anticipate that the patient may need a TCU at the time of discharge.      3.  History of hypertension:  His blood pressure for now seems to be well controlled.  We will continue with his prior to admission labetalol 300 mg p.o. twice daily.      4.  History of dyslipidemia:  Hold PTA daily Lipitor 20 mg p.o.   - check CK    5.  History of coronary artery disease, status post multiple stents placed in the past:  This does not seem to be an acute issue at this time.  He is not on aspirin.    - continue with his prior to admission beta blocker     6.  History of bladder cancer, status post multiple cystoscopies with transurethral resections of large bladder tumors, with last one on 12/10/2016 by Dr. Porter:  He is also status post chemotherapy and radiation therapy.  He will follow up with Dr. Porter.      7.  History of paroxysmal atrial fibrillation:  I think he was on Eliquis at some point in the past, now off anticoagulation because he developed hemoptysis in the past.      8.  Chronic kidney disease, stage III:  Most recent creatinine varied between 1.2 and 1.4.  Currently his creatinine is 1.37, around his baseline.  We will closely monitor his kidney function.  We will avoid nephrotoxic drugs.      9.  Ischemic systolic cardiomyopathy, with echocardiogram in 05/2015 showing  ejection fraction of 45-50%:  He seems euvolemic at this time.  He is not on any diuretics at home.  We will mildly hydrate him with normal saline at 50 cc per hour, and we will closely monitor his fluid status since it seems that during his admission in 08/2016, he developed respiratory distress due to volume overload.      10.  History of constipation.  We will continue his prior to admission scheduled Senna, and MiraLax p.r.n.      DVT Prophylaxis: Pneumatic Compression Devices  Code Status: DNR/DNI    Disposition: Expected discharge in 1-2 days once urine culture available, evaluated by therapy and safe dispo plan made. Discussed with his wife at length.    Papo Ochoa MD  hospitalist    Interval History  Patient was seen and examined, reports increased urinary    -Data reviewed today: I reviewed all new labs and imaging results over the last 24 hours. I personally reviewed no images or EKG's today.    Physical Exam  Temp: 97.8  F (36.6  C) Temp src: Oral BP: 149/70 mmHg Pulse: 78 Heart Rate: 75 Resp: 16 SpO2: 95 % O2 Device: None (Room air)    Filed Vitals:    01/02/17 2247 01/03/17 0614   Weight: 72 kg (158 lb 11.7 oz) 72 kg (158 lb 11.7 oz)     Vital Signs with Ranges  Temp:  [97.8  F (36.6  C)-99.7  F (37.6  C)] 97.8  F (36.6  C)  Pulse:  [78-84] 78  Heart Rate:  [75-78] 75  Resp:  [16] 16  BP: (124-160)/(57-83) 149/70 mmHg  SpO2:  [92 %-97 %] 95 %  I/O last 3 completed shifts:  In: 1373 [I.V.:373; IV Piggyback:1000]  Out: -     Constitutional: Alert, awake. Not in distress.   HEENT PERRLA, EOMI, mucosa dry  Respiratory: Bilateral equal air entry, clear to auscultation. No respiratory distress.  Cardiovascular: Regular s1s2, no murmur, rub or gallop. No tachycardia.  GI: Soft, non distended, non tender, bowel tones active.  Skin/Integumen: No rash, no blister.  Other:      Medications    NaCl 50 mL/hr at 01/02/17 7985       atorvastatin (LIPITOR) tablet 20 mg  20 mg Oral QPM     ferrous sulfate  325 mg  "Oral BID     labetalol  300 mg Oral BID     sennosides  2 tablet Oral BID     cefTRIAXone  1 g Intravenous Q24H     sodium chloride (PF)  3 mL Intracatheter Q8H       Data    Recent Labs  Lab 01/03/17  0740 01/02/17  1911   WBC 9.9 9.9   HGB 8.8* 9.3*   MCV 95 96    279    139   POTASSIUM 3.8 3.8   CHLORIDE 107 104   CO2 25 24   BUN 24 32*   CR 1.31* 1.37*   ANIONGAP 10 11   TOR 8.3* 8.8   * 120*   ALBUMIN  --  2.7*   PROTTOTAL  --  7.1   BILITOTAL  --  0.4   ALKPHOS  --  78   ALT  --  39   AST  --  27       No results found for this or any previous visit (from the past 24 hour(s)).         ED Notes by Patrica Bledsoe RN at 1/2/2017  8:18 PM     Author:  Patrica Bledsoe RN Service:  (none) Author Type:  Registered Nurse    Filed:  1/2/2017  8:25 PM Note Time:  1/2/2017  8:18 PM Status:  Signed    :  Patrica Bledsoe RN (Registered Nurse)           [facility]  ED Nurse Handoff Report    ED Chief complaint: Generalized Weakness      ED Diagnosis:   Final diagnoses:   None       Code Status: Full Code per prior status, not addressed at this time    Allergies: No Known Allergies    Activity level:  Has stood at bedside but has not ambulated, stable with standing, has been increasingly weak at home     Needed?: No    Isolation: No  Infection: Not Applicable    Bariatric?: No      Vital Signs:   Filed Vitals:    01/02/17 1704 01/02/17 1836 01/02/17 1837   BP: 124/62 146/79    Pulse: 84     Temp: 99.7  F (37.6  C)     TempSrc: Oral     Resp: 16     Height: 1.702 m (5' 7\")     SpO2: 96%  94%       Cardiac Rhythm: ,        Pain level: 0-10 Pain Scale: 2    Is this patient confused?: Yes, needs frequent cues on what to do    Patient Report: Initial Complaint: weakness  Focused Assessment: Had ORIF femur, was at rehab facility for 77 days, back at home was unable to participate in home PT d/t increased weakness which is getting worse.   Hx bladder CA.  Wife also " reports poor PO intake.  Tests Performed: Blood drawn, urine sample sent  Abnormal Results: see results, urine in process  Treatments provided: 1 L NS    Family Comments: wife at bedside    OBS brochure/video discussed/provided to patient: No    ED Medications:   Medications   sodium chloride (PF) 0.9% PF flush 3 mL (not administered)   sodium chloride (PF) 0.9% PF flush 3 mL (not administered)   0.9% sodium chloride BOLUS (1,000 mLs Intravenous New Bag 1/2/17 1910)     Followed by   0.9% sodium chloride infusion (not administered)       Drips infusing?:  No      ED NURSE PHONE NUMBER: [phone]                      Procedure Notes     No notes of this type exist for this encounter.         Progress Notes - Therapies (Notes from 01/02/17 through 01/05/17)      Progress Notes by Sveta Loo PT at 1/4/2017 12:35 PM     Author:  Sveta Loo PT Service:  (none) Author Type:  Physical Therapist    Filed:  1/4/2017 12:35 PM Note Time:  1/4/2017 12:35 PM Status:  Signed    :  Sveta Loo PT (Physical Therapist)              01/04/17 0800   Quick Adds   Type of Visit Initial PT Evaluation   Living Environment   Lives With spouse   Living Arrangements house   Home Accessibility tub/shower is not walk in   Number of Stairs to Enter Home 2  (2 rail)   Number of Stairs Within Home 14  (one rail)   Transportation Available family or friend will provide  (Sons provide transportation)   Self-Care   Usual Activity Tolerance moderate   Equipment Currently Used at Home walker, rolling  (tub bench)   Functional Level Prior   Ambulation 1-->assistive equipment  (FWW)   Transferring 1-->assistive equipment   Toileting 2-->assistive person  (occasional assist from wife)   Bathing 3-->assistive equipment and person  (tub bench and wife)   Dressing 2-->assistive person  (Assistance from wife sometimes)   Fall history within last six months yes   Number of times patient has fallen within last six months 1  (Patient  reports he fell in August and needed a rehab stay)   Which of the above functional risks had a recent onset or change? ambulation;transferring   Prior Functional Level Comment Patient reports he is modified independent with mobility with a FWW. He reports he needs assist with ADL's. R UE coorindation/strength impaired from previous stroke.    General Information   Onset of Illness/Injury or Date of Surgery - Date 01/02/17   Referring Physician MD Jose   Patient/Family Goals Statement Return home   Pertinent History of Current Problem (include personal factors and/or comorbidities that impact the POC) 83 year old male admitted with weakness and found to have a UTI. PMH significant for HTN. Pt also reports a previous stroke about 2-3 years prior affecting the R UE/LE.    Precautions/Limitations fall precautions   General Info Comments Activity: ambulate with assist   Cognitive Status Examination   Orientation orientation to person, place and time   Level of Consciousness alert   Follows Commands and Answers Questions 100% of the time   Pain Assessment   Patient Currently in Pain (L sided abdominal pain)   Posture    Posture Not impaired   Range of Motion (ROM)   ROM Comment Decreased R UE AROM d/t previous stroke - pt does use this hand with functional mobility.    Strength   Strength Comments Sufficient for mobility as noted below.    Bed Mobility   Bed Mobility Comments Supine to sit with SBA.    Transfer Skills   Transfer Comments Sit to/from stand with CGA (performs from bed, standard height chair and toilet).    Gait   Gait Comments Ambulates 300 feet x 2 reps with FWW and CGA progressing to SBA. Decreased gait speed. Pt reports weakness compared to baseline. No LOB.    Balance   Balance Comments Requires bilateral UE supporton FWW for safe dynamic mobility.    Coordination   Coordination (R UE appears to have some coorindation deficits. )   General Therapy Interventions   Planned Therapy Interventions gait  "training;strengthening;transfer training;home program guidelines;progressive activity/exercise   Clinical Impression   Criteria for Skilled Therapeutic Intervention yes, treatment indicated   PT Diagnosis Generalized weakness   Influenced by the following impairments Decreased LE strength, decreased activity tolerance   Functional limitations due to impairments Decreased independence with gait/ADL's   Clinical Presentation Stable/Uncomplicated   Clinical Presentation Rationale Patient profile currently appears stable.    Clinical Decision Making (Complexity) Low complexity   Therapy Frequency` daily   Predicted Duration of Therapy Intervention (days/wks) 3 days   Anticipated Discharge Disposition Home with Home Therapy;Home with Assist;Transitional Care Facility   Risk & Benefits of therapy have been explained Yes   Patient, Family & other staff in agreement with plan of care Yes   Clinical Impression Comments Patient appropriate for continued acute care PT to address strength deficits to maximize independence with functional mobility.    [facility] [employer] TM \"6 Clicks\"   2016, Trustees of [facility], under license to [employer].  All rights reserved.   6 Clicks Short Forms Basic Mobility Inpatient Short Form   [facility] AM-PAC  \"6 Clicks\" V.2 Basic Mobility Inpatient Short Form   1. Turning from your back to your side while in a flat bed without using bedrails? 4 - None   2. Moving from lying on your back to sitting on the side of a flat bed without using bedrails? 4 - None   3. Moving to and from a bed to a chair (including a wheelchair)? 3 - A Little   4. Standing up from a chair using your arms (e.g., wheelchair, or bedside chair)? 3 - A Little   5. To walk in hospital room? 3 - A Little   6. Climbing 3-5 steps with a railing? 3 - A Little   Basic Mobility Raw Score (Score out of 24.Lower scores equate to lower levels of function) 20   Total Evaluation Time   Total Evaluation Time " (Minutes) 10

## 2017-01-02 NOTE — IP AVS SNAPSHOT
` ` Patient Information     Patient Name Sex     Brian Hernandez ([id number]) Male 1933       Room Bed    [facility]      Patient Demographics     Address Phone E-mail Address    [address] [address] [phone] (Home)  none (Work)  [phone] (Mobile) *Preferred* [email or web address]      Patient Ethnicity & Race     Ethnic Group Patient Race    American White      Emergency Contact(s)     Name Relation Home Work Mobile    Mi Hernandez Spouse [phone] none None    Eladio Hernandez Son [phone] none [phone]      Documents on File        Status Date Received Description       Documents for the Patient    Insurance Card  05     Other  05 medicare    Other  05 no show policy    Consent Form  05     Face Sheet Received 05/21/10     Privacy Notice - [place] Received 02/10/14     External Medication Information Consent Accepted 12     Patient ID Received 14 DL -2015    Consent for Services - Hospital/Clinic Received 12     Advance Directives and Living Will Received 12 [place] Health Care Direct[place]  04    Insurance Card Received 12 BCBS    Insurance Card Received 12 Medicare    Advance Directives and Living Will Received 13 Health Care Directive,  04    Consent for EHR Access  13 Copied from existing Consent for services - C/HOD collected on 2012    [facility] Specified Other       Consent for Services - Hospital/Clinic Received 14     Consent to Communicate   Consent to Communicate, 14    Insurance Card Received 02/10/14 Medicare    Insurance Card Received 02/10/14 BCBS- [place]    Consent for EHR Access Received 02/10/14     Consent for Services - Hospital/Clinic Received 02/10/14     Consent to Communicate Received 02/10/14     Advance Directives and Living Will Not Received  VALIDATION OF AD--2004    Advance Directives and Living Will Received  HEALTH CARE DIRECTIVE  02/19/2004    Physical Therapy Certification Received 06/11/14 5/13/14 to 7/11/14    Speech Therapy Certification Received 05/21/14 5/20/14-7/15/14    Speech Therapy Re-Certification Received 07/07/14 7/3/14-8/1/14, progress note    Physical Therapy Re-Certification Received 08/01/14 7/12/14 to 8/1/14    Occupational Therapy Certification Received 12/01/14 11/20-1/1/15    Occupational Therapy Re-Certification Received 02/02/15 1/2-1/30/15    Insurance Card Received 02/19/15  ALDO Jamison    Consent for Services - Hospital/Clinic Received 02/19/15     Patient ID Received 06/30/15     Business/Insurance/Care Coordination/Health Form - Patient  11/18/15 CANCELLATION AND LATE ARRIVAL POLICY    Business/Insurance/Care Coordination/Health Form - Patient  12/03/15 CANCELLATION AND LATE ARRIVAL POLICY    Insurance Card Received 01/04/16     Consent for Services/Privacy Notice - Hospital/Clinic Received 03/30/16     Business/Insurance/Care Coordination/Health Form - Patient  05/25/16 MEDICARE BLUE-MEDICATION APPROVED-05/23/2016    [facility] Authorization - File Only  05/25/16 [place] [facility]    [facility] Authorization  08/18/16     [facility] Authorization  12/15/16 [facility]/Spinal Modulation       Documents for the Encounter    CMS IM for Patient Signature Received 01/03/17 1MM      Admission Information     Attending Provider Admitting Provider Admission Type Admission Date/Time    Agueda Garcia MD Nistor, Doina Simona, MD Emergency 01/02/17  1756    Discharge Date Hospital Service Auth/Cert Status Service Area     Hospitalist [facility]    Unit Room/Bed Admission Status     88 ONCOLOGY 8832/8832-01 Admission (Confirmed)            Admission     Complaint    UTI (urinary tract infection)      Hospital Account     Name Acct ID Class Status Primary Coverage    Brian Hernandez [id number] Inpatient Open MEDICARE - MEDICARE            Guarantor Account (for Hospital Account #[id number])      Name Relation to Pt Service Area Active? Acct Type    Brian Hernandez Self FCS Yes Personal/Family    Address Phone          [address] [place] [address] [phone](H)  none(O)              Coverage Information (for Hospital Account #[id number])     1. MEDICARE/MEDICARE     F/O Payor/Plan Precert #    MEDICARE/MEDICARE     Subscriber Subscriber #    Brian Hernandez [id number]    Address Phone    [employer]  [address]  [place] [address] [phone]          2. BCBS/[place]     F/O Payor/Plan Precert #    [place]     Subscriber Subscriber #    Brian Hernandez [id number]    Address Phone    [address] [address]  [place], [place] [id number] [phone]

## 2017-01-02 NOTE — IP AVS SNAPSHOT
` [place] [address] ONCOLOGY: [phone]            Medication Administration Report for Brian Hernandez as of 01/05/17 1435   Legend:    Given Hold Not Given Due Canceled Entry Other Actions    Time Time (Time) Time  Time-Action       Inactive    Active    Linked        Medications 12/30/16 12/31/16 01/01/17 01/02/17 01/03/17 01/04/17 01/05/17    acetaminophen (TYLENOL) Suppository 650 mg  Dose: 650 mg Freq: EVERY 4 HOURS PRN Route: RE  PRN Reason: mild pain  Start: 01/02/17 2235   Admin Instructions: Alternate ibuprofen (if ordered) with acetaminophen.  Maximum acetaminophen dose from all sources = 75 mg/kg/day not to exceed 4 grams/day.               acetaminophen (TYLENOL) tablet 650 mg  Dose: 650 mg Freq: EVERY 4 HOURS PRN Route: PO  PRN Reason: mild pain  Start: 01/02/17 2235   Admin Instructions: Alternate ibuprofen (if ordered) with acetaminophen.  Maximum acetaminophen dose from all sources = 75 mg/kg/day not to exceed 4 grams/day.          2047 (650 mg)-Given            ciprofloxacin (CIPRO) tablet 500 mg  Dose: 500 mg Freq: EVERY 12 HOURS SCHEDULED Route: PO  Indications of Use: URINARY TRACT INFECTION  Start: 01/04/17 2045 2047 (500 mg)-Given        0849 (500 mg)-Given       [ ] 2000           ferrous sulfate (IRON) tablet 325 mg  Dose: 325 mg Freq: 2 TIMES DAILY Route: PO  Start: 01/02/17 2245   Admin Instructions: Absorbed best on an empty stomach. If stomach upset occurs, can take with meals.        2253 (325 mg)-Given        0833 (325 mg)-Given       2002 (325 mg)-Given        0859 (325 mg)-Given       2023 (325 mg)-Given        1308 (325 mg)-Given       [ ] 2200           folic acid (FOLVITE) tablet 1 mg  Dose: 1 mg Freq: DAILY Route: PO  Start: 01/04/17 1845         2023 (1 mg)-Given        0849 (1 mg)-Given           labetalol (NORMODYNE) tablet 300 mg  Dose: 300 mg Freq: 2 TIMES DAILY Route: PO  Start: 01/02/17 2300   Admin Instructions: Hold it if SBP<110 mmHg or HR<55bmp         2356 (300 mg)-Given        0833 (300 mg)-Given       2002 (300 mg)-Given        0859 (300 mg)-Given       2023 (300 mg)-Given        0849 (300 mg)-Given       [ ] 2100           naloxone (NARCAN) injection 0.1-0.4 mg  Dose: 0.1-0.4 mg Freq: EVERY 2 MIN PRN Route: IV  PRN Reason: opioid reversal  Start: 01/02/17 2236   Admin Instructions: For respiratory rate LESS than or EQUAL to 8.  Partial reversal dose:  0.1 mg titrated q 2 minutes for Analgesia Side Effects Monitoring Sedation Level of 3 (frequently drowsy, arousable, drifts to sleep during conversation).Full reversal dose:  0.4 mg bolus for Analgesia Side Effects Monitoring Sedation Level of 4 (somnolent, minimal or no response to stimulation).               ondansetron (ZOFRAN-ODT) ODT tab 4 mg  Dose: 4 mg Freq: EVERY 6 HOURS PRN Route: PO  PRN Reason: nausea  Start: 01/02/17 2236   Admin Instructions: This is Step 1 of nausea and vomiting management.  If nausea not resolved in 15 minutes, go to Step 2 prochlorperazine (COMPAZINE). Do not push through foil backing. Peel back foil and gently remove. Place on tongue immediately. Administration with liquid unnecessary              Or  ondansetron (ZOFRAN) injection 4 mg  Dose: 4 mg Freq: EVERY 6 HOURS PRN Route: IV  PRN Reasons: nausea,vomiting  Start: 01/02/17 2236   Admin Instructions: This is Step 1 of nausea and vomiting management.  If nausea not resolved in 15 minutes, go to Step 2 prochlorperazine (COMPAZINE).               polyethylene glycol (MIRALAX/GLYCOLAX) Packet 17 g  Dose: 17 g Freq: DAILY PRN Route: PO  PRN Reason: constipation  Start: 01/02/17 2236   Admin Instructions: 1 Packet = 17 grams. Mixed prescribed dose in 8 ounces of water.  1 Packet = 17 grams. Mixed prescribed dose in 8 ounces of water. Follow with 8 oz. of water.         0833 (17 g)-Given             sennosides (SENOKOT) tablet 2 tablet  Dose: 2 tablet Freq: DAILY PRN Route: PO  PRN Reason: constipation  Start: 01/04/17 8469     "         Completed Medications  Medications 12/30/16 12/31/16 01/01/17 01/02/17 01/03/17 01/04/17 01/05/17         Dose: 1,000 mL Freq: ONCE Route: IV  Last Dose: Stopped (01/02/17 2115)  Start: 01/02/17 1857   End: 01/02/17 2115 1910 (1,000 mL)-New Bag       2115-Stopped             Discontinued Medications  Medications 12/30/16 12/31/16 01/01/17 01/02/17 01/03/17 01/04/17 01/05/17         Rate: 50 mL/hr Freq: CONTINUOUS Route: IV  Start: 01/02/17 2245   End: 01/04/17 1511       2253 ( )-New Bag        1557 ( )-New Bag        1511-Med Discontinued          Rate: 125 mL/hr Freq: CONTINUOUS Route: IV  Start: 01/02/17 1857   End: 01/02/17 2236   Admin Instructions: Administer after the bolus.        2236-Med Discontinued                  Dose: 20 mg Freq: EVERY EVENING Route: PO  Start: 01/02/17 2245   End: 01/03/17 0903       2253 (20 mg)-Given        0903-Med Discontinued           Dose: 1 g Freq: EVERY 24 HOURS Route: IV  Indications of Use: URINARY TRACT INFECTION  Start: 01/03/17 2100   End: 01/04/17 2038 2002 (1 g)-New Bag        2038-Med Discontinued          Dose: 1 g Freq: EVERY 24 HOURS Route: IV  Indications of Use: URINARY TRACT INFECTION  Last Dose: Stopped (01/02/17 2216)  Start: 01/02/17 2100   End: 01/02/17 2236 2110 (1 g)-New Bag       2216-Stopped       2236-Med Discontinued            Freq: EVERY 1 HOUR PRN Route: Top  PRN Reason: pain  PRN Comment: with VAD insertion or accessing implanted port.  Start: 01/02/17 2236   End: 01/04/17 2338   Admin Instructions: Do NOT give if patient has a history of allergy to any local anesthetic or any \"kassi\" product.   Apply 30 minutes prior to VAD insertion or port access.  MAX Dose:  2.5 g (  of 5 g tube)          2338-Med Discontinued          Dose: 1 mL Freq: EVERY 1 HOUR PRN Route: OTHER  PRN Comment: mild pain with VAD insertion or accessing implanted port  Start: 01/02/17 2236   End: 01/04/17 2338   Admin Instructions: Do NOT give if " "patient has a history of allergy to any local anesthetic or any \"kassi\" product. MAX dose 1 mL subcutaneous OR intradermal in divided doses.          2338-Med Discontinued          Dose: 2 tablet Freq: 2 TIMES DAILY Route: PO  Start: 01/02/17 2245   End: 01/03/17 1837       2253 (2 tablet)-Given        0832 (2 tablet)-Given       1837-Med Discontinued           Dose: 3 mL Freq: EVERY 8 HOURS Route: IK  Start: 01/02/17 2245   End: 01/04/17 2338   Admin Instructions: And Q1H PRN, to lock peripheral IV dormant line.        (2249)-Not Given        (0546)-Not Given       1553 (3 mL)-Given               (0506)-Not Given              1622 (3 mL)-Given       2338-Med Discontinued          Dose: 3 mL Freq: EVERY 1 HOUR PRN Route: IK  PRN Reason: line flush  PRN Comment: for peripheral IV flush post IV meds  Start: 01/02/17 2236   End: 01/04/17 2338         2338-Med Discontinued          Dose: 3 mL Freq: EVERY 8 HOURS Route: IK  Start: 01/02/17 1857   End: 01/02/17 2236   Admin Instructions: And Q1H PRN, to lock peripheral IV dormant line.        2236-Med Discontinued  (2259)-Not Given                Dose: 3 mL Freq: EVERY 1 HOUR PRN Route: IK  PRN Reason: line flush  PRN Comment: for peripheral IV flush post IV meds  Start: 01/02/17 1856   End: 01/02/17 2236 2236-Med Discontinued       Medications 12/30/16 12/31/16 01/01/17 01/02/17 01/03/17 01/04/17 01/05/17        "

## 2017-01-02 NOTE — IP AVS SNAPSHOT
"` [place] [address] ONCOLOGY: [phone]                                              INTERAGENCY TRANSFER FORM - NURSING   2017                    Hospital Admission Date: 2017  DAVID BINGHAM   : 1933  Sex: Male        Attending Provider: Agueda Garcia MD     Allergies:  No Known Allergies    Infection:  None   Service:  HOSPITALIST    Ht:  1.702 m (5' 7\")   Wt:  74.481 kg (164 lb 3.2 oz)   Admission Wt:  72 kg (158 lb 11.7 oz)    BMI:  25.71 kg/m 2   BSA:  1.88 m 2            Patient PCP Information     Provider PCP Type    Flip Prakash MD General      Current Code Status     Date Active Code Status Order ID Comments User Context       Prior      Code Status History     Date Active Date Inactive Code Status Order ID Comments User Context    2017  6:40 PM  DNR/DNI [id number]  Papo Ochoa MD Outpatient    2017 10:36 PM 2017  6:40 PM DNR/DNI [id number]  Agueda Garcia MD Inpatient    2016  5:20 PM 2017 10:36 PM Full Code [id number]  lFip Porter MD Outpatient    2016  3:47 PM 2016  5:20 PM Full Code [id number]  Flip Porter MD Inpatient    2016  8:16 PM 2016  9:19 AM DNR/DNI [id number]  Faustino Gunter MD Inpatient    2016  8:57 AM 2016  8:16 PM Full Code [id number]  Flip Porter MD Outpatient    2016  4:14 PM 2016  4:54 PM Full Code [id number]  Flip Porter MD Inpatient    2016 11:34 AM 2016  4:14 PM Full Code [id number]  Flip Porter MD Outpatient    2015  2:27 PM 2016 11:34 AM DNR/DNI [id number]  Agueda Garcia MD Outpatient    2015  4:53 PM 2015  2:27 PM DNR/DNI [id number]  Agueda Garcia MD Inpatient    4/10/2014 10:48 AM 2015  4:53 PM Full Code [id number]  Luis Silveira MD Outpatient    3/26/2014 10:11 PM 4/10/2014 10:48 AM Full Code [id number]  Luis Silveira MD Inpatient    3/25/2014  9:55 AM " 3/26/2014 10:11 PM DNR/DNI [id number]  Mia Kim MD Outpatient    3/20/2014 10:03 AM 3/25/2014  9:55 AM DNR/DNI [id number]  Michael Chance MD Inpatient    3/15/2014  9:51 PM 3/20/2014 10:03 AM Full Code [id number]  Yasmine Catsillo, RN Inpatient    3/10/2014  3:47 PM 3/15/2014  9:51 PM Full Code [id number]  Shira Sood MD Inpatient      Advance Directives        Does patient have a scanned Advance Directive/ACP document in EPIC?           Yes        Hospital Problems as of 1/5/2017              Priority Class Noted POA    UTI (urinary tract infection) Medium  1/2/2017 Yes      Non-Hospital Problems as of 1/5/2017              Priority Class Noted    iamENTHESOPATHY OF HIP   8/16/2005    Intermittent asthma   12/12/2012    Hyperlipidemia LDL goal <70   12/12/2012    Chronic constipation   12/12/2012    Rhinorrhea   7/12/2013    Infected sebaceous cyst Medium  2/24/2014    Cerebral infarction (H)   3/11/2014    Occlusion and stenosis of carotid artery with cerebral infarction Medium  3/19/2014    PVD (peripheral vascular disease) (H) Medium  3/25/2014    Stroke (H)   3/25/2014    Constipation Medium  4/10/2014    Late effects of CVA (cerebrovascular accident) Medium  5/16/2014    Advance Care Planning   10/27/2014    Atrial fibrillation with RVR (H)   5/28/2015    Syncope and collapse   5/29/2015    Coronary artery disease involving native coronary artery of native heart without angina pectoris   12/29/2015    Bladder cancer (H) Medium  1/4/2016    Urine frequency Medium  3/30/2016    Closed right hip fracture (H) Medium  8/17/2016    Fall Medium  8/17/2016    Pulmonary hypertension (H) Medium  Unknown    Snores Medium  Unknown    Pulmonary nodules Medium  Unknown    Anemia Medium  Unknown    Closed right hip fracture, with routine healing, subsequent encounter Medium  8/28/2016    S/P ORIF (open reduction internal fixation) fracture Medium  8/28/2016    Paroxysmal a-fib (H) Medium  8/28/2016    Anemia due to  blood loss, acute Medium  8/28/2016    Malignant neoplasm of urinary bladder, unspecified site (H) Medium  8/28/2016    Constipation, unspecified constipation type Medium  8/28/2016    Pancytopenia (H) Medium  9/7/2016    Adjustment disorder with depressed mood Medium  9/14/2016    Essential hypertension with goal blood pressure less than 140/90 Medium  9/30/2016    CKD (chronic kidney disease) stage 3, GFR 30-59 ml/min Medium  10/16/2016    Thrombocytopenia (H) Medium  10/25/2016    Gross hematuria Medium  10/31/2016    Generalized muscle weakness Medium  11/12/2016    Bladder tumor Medium  12/9/2016      Immunizations     Name Date      Influenza (High Dose) 3 valent vaccine 10/01/16     Influenza (High Dose) 3 valent vaccine 10/06/15     Influenza (High Dose) 3 valent vaccine 10/03/13     Influenza (IIV3) 09/24/14     Influenza (IIV3) 10/19/11     Pneumococcal (PCV 13) 06/15/15     Pneumococcal 23 valent 01/01/06     Tdap (Adacel,Boostrix) 06/11/12          END      ASSESSMENT     Discharge Profile Flowsheet     EXPECTED DISCHARGE     COMMUNICATION ASSESSMENT      Expected Discharge Date  01/05/17 01/05/17 1126   Patient's communication style  spoken language (English or Bilingual) 01/02/17 1704    DISCHARGE NEEDS ASSESSMENT     FINAL RESOURCES      Concerns To Be Addressed  discharge planning concerns 01/04/17 1540   Resources List  Transitional Care 01/05/17 1126    Patient/family verbalizes understanding of discharge plan recommendations?  Yes 01/04/17 1540   Other Resources  Transportation Services 01/05/17 1126    Medical Team notified of plan?  yes 01/04/17 1540   Transportation Agency  [facility] 01/05/17 1126    Anticipated Changes Related to Illness  inability to care for self 01/04/17 1540   Transportation Contact Info  [phone] 01/05/17 1126    Equipment Currently Used at Home  walker, rolling (tub bench) 01/04/17 0818   Transportation Status  Active 01/05/17 1126    Transportation Available   "family or friend will provide 01/04/17 1540   Transitional Care  [facility] [phone] 01/05/17 1126    Current Discharge Risk  physical impairment 01/04/17 1540   PAS Number  [id number] 08/22/16 1429    # of Referrals Placed by CTS  Post Acute Facilities 01/04/17 1540   Senior Linkage Line Referral Placed  08/22/16 08/22/16 1429    Equipment Used at Home  cane, straight (also owns FWW) 03/26/14 1428   Referrals Placed  Senior Linkage Line 08/22/16 1429    GASTROINTESTINAL (ADULT,PEDIATRIC,OB)     SKIN      GI WDL  WDL 01/05/17 0023   Inspection  Full 01/04/17 1648    Last Bowel Movement  01/04/17 01/04/17 1648   Skin WDL  WDL 01/05/17 0023    GI Signs/Symptoms  abdominal discomfort 01/04/17 1106   SAFETY      Passing flatus  yes 01/05/17 0023   Safety WDL  WDL 01/05/17 0023                 Assessment WDL (Within Defined Limits) Definitions           Safety WDL     Effective: 09/28/15    Row Information: <b>WDL Definition:</b> Bed in low position, wheels locked; call light in reach; upper side rails up x 2; ID band on<br> <font color=\"gray\"><i>Item=AS safety wdl>>List=AS safety wdl>>Version=F14</i></font>      Skin WDL     Effective: 09/28/15    Row Information: <b>WDL Definition:</b> Warm; dry; intact; elastic; without discoloration; pressure points without redness<br> <font color=\"gray\"><i>Item=AS skin wdl>>List=AS skin wdl>>Version=F14</i></font>      Vitals     Vital Signs Flowsheet     VITAL SIGNS     Height Method  Stated 01/02/17 1706    Temp  98.5  F (36.9  C) 01/05/17 1114   Weight  74.481 kg (164 lb 3.2 oz) 01/05/17 0348    Temp src  Oral 01/05/17 1114   Estimated Weight (From ED)  75.8 kg (167 lb 1.7 oz) 01/02/17 1706    Resp  18 01/05/17 1114   POSITIONING      Pulse  69 01/05/17 1114   Body Position  supine, head elevated 01/05/17 0947    Heart Rate  79 01/04/17 2022   Head of Bed (HOB)  HOB at 20 degrees 01/05/17 0947    Pulse/Heart Rate Source  Monitor 01/05/17 1114   DAILY CARE      BP  " "132/56 mmHg 01/05/17 1114   Activity Type  ambulated in room;ambulated to bathroom 01/05/17 0947    BP Location  Left arm 01/05/17 1114   Activity Level of Assistance  assistance, 1 person 01/05/17 0947    OXYGEN THERAPY     Activity Assistive Device  walker;gait belt 01/05/17 0947    SpO2  95 % 01/05/17 1114   ECG      O2 Device  None (Room air) 01/05/17 1114   ECG Rhythm  Sinus rhythm 12/09/16 1359    PAIN/COMFORT     Ectopy  None 12/09/16 1359    Patient Currently in Pain  denies 01/04/17 1644   Lead Monitored  Lead II 12/09/16 1359    Preferred Pain Scale  word (verbal rating pain scale) 12/09/16 1505   ANALGESIA SIDE EFFECTS MONITORING      0-10 Pain Scale  2 01/02/17 1706   Side Effects Monitoring: Respiratory Quality  R 01/04/17 1644    HEIGHT AND WEIGHT     Side Effects Monitoring: Respiratory Depth  N 01/04/17 1644    Height  1.702 m (5' 7\") 01/02/17 1706   Side Effects Monitoring: Sedation Level  1 01/04/17 1644            Patient Lines/Drains/Airways Status    Active LINES/DRAINS/AIRWAYS     Name: Placement date: Placement time: Site: Days: Last dressing change:    Wound 08/17/16 Right Elbow Abrasion(s) 08/17/16  1926  Elbow  140     Incision/Surgical Site 08/19/16 Right Hip 08/19/16  1850   138             Patient Lines/Drains/Airways Status    Active PICC/CVC     **None**            Intake/Output Detail Report     Date Intake     Output   Net    Shift P.O. I.V. IV Piggyback Total Urine Blood Total       Noc 01/03/17 2300 - 01/04/17 0659 -- -- -- -- -- -- -- 0    Day 01/04/17 0700 - 01/04/17 1459 420 -- -- 420 -- -- -- 420    Olga 01/04/17 1500 - 01/04/17 2259 540 -- -- 540 -- -- -- 540    Noc 01/04/17 2300 - 01/05/17 0659 -- -- -- -- -- -- -- 0    Day 01/05/17 0700 - 01/05/17 1459 -- -- -- -- -- -- -- 0      Last Void/BM       Most Recent Value    Urine Occurrence 1 at 01/05/2017 1120    Stool Occurrence 1 at 01/05/2017 0947      Case Management/Discharge Planning     Case Management/Discharge Planning " Flowsheet     REFERRAL INFORMATION     EXPECTED DISCHARGE      Admission Type  inpatient 01/04/17 1540   Expected Discharge Date  01/05/17 01/05/17 1126    Arrived From  home or self-care 01/04/17 1540   ASSESSMENT/CONCERNS TO BE ADDRESSED      # of Referrals Placed by [facility]  Post Acute Facilities 01/04/17 1540   Concerns To Be Addressed  discharge planning concerns 01/04/17 1540    Reason For Consult  discharge planning 01/04/17 1540   DISCHARGE PLANNING      Record Reviewed  patient profile 01/04/17 1540   Patient/family verbalizes understanding of discharge plan recommendations?  Yes 01/04/17 1540     Assigned to Case  DIMITRIOS Diamond 01/05/17 1126   Medical Team notified of plan?  yes 01/04/17 1540    Primary Care Clinic Name  [facility] 05/29/15 1428   Anticipated Changes Related to Illness  inability to care for self 01/04/17 1540    Primary Care MD Name  Flip Prakash MD 08/22/16 1042   Transportation Available  family or friend will provide 01/04/17 1540    LIVING ENVIRONMENT     Current Discharge Risk  physical impairment 01/04/17 1540    Lives With  spouse 01/04/17 1540   Equipment Used at Home  cane, straight (also owns FWW) 03/26/14 1428    Living Arrangements  house 01/04/17 1540   FINAL NOTE      Provides Primary Care For  no one 01/04/17 1540   Final Note  Patient is to d/c to [facility] via w/c at 1530 on 1/5 01/05/17 1126    Quality Of Family Relationships  supportive;involved 01/04/17 1540   FINAL RESOURCES      Able to Return to Prior Living Arrangements  yes 01/04/17 1540   Equipment Currently Used at Home  walker, rolling (tub bench) 01/04/17 0818    HOME SAFETY     Resources List  Transitional Care 01/05/17 1126    Patient Feels Safe Living in Home?  yes 01/04/17 1540   Other Resources  Transportation Services 01/05/17 1126    ASSESSMENT OF FAMILY/SOCIAL SUPPORT     Transportation Agency  [facility] 01/05/17 1126    Marital Status   01/04/17  1540   Transportation Contact Info  [phone] 01/05/17 1126    Who is your support system?  Wife;Children 01/04/17 1540   Transportation Status  Active 01/05/17 1126    Spouse's Name  Mi 01/04/17 1540   Transitional Care  [facility] [phone] 01/05/17 1126    Description of Support System  Supportive;Involved 01/04/17 1540   PAS Number  [id number] 08/22/16 1429    Support Assessment  Adequate family and caregiver support 01/04/17 1540   Senior Linkage Line Referral Placed  08/22/16 08/22/16 1429    Quality of Family Relationships  supportive;involved 01/04/17 1540   Referrals Placed  Senior Linkage Line 08/22/16 1429    Communication preferences  phone 01/04/17 1540   ABUSE RISK SCREEN      COPING/STRESS     QUESTION TO PATIENT:  Has a member of your family or a partner(now or in the past) intimidated, hurt, manipulated, or controlled you in any way?  no 01/02/17 2145    Major Change/Loss/Stressor  hospitalization 01/04/17 1540   QUESTION TO PATIENT: Do you feel safe going back to the place where you are living?  yes 01/02/17 2145    Patient Personal Strengths  able to adapt;strong support system 01/04/17 1540   OBSERVATION: Is there reason to believe there has been maltreatment of a vulnerable adult (ie. Physical/Sexual/Emotional abuse, self neglect, lack of adequate food, shelter, medical care, or financial exploitation)?  no 01/02/17 2145    Sources Of Support  spouse;adult child(kori) 01/04/17 1540   (R) MENTAL HEALTH SUICIDE RISK      Reaction To Health Status  accepting 01/04/17 1540   Are you depressed or being treated for depression?  No 01/02/17 2145    Understanding Of Condition And Treatment  adequate understanding of medical condition 01/04/17 1540

## 2017-01-02 NOTE — TELEPHONE ENCOUNTER
Patient's wife Mi called reporting patient has worsening weakness since discharged from ED 12/2016. PT has stopped therapies due to weakness and also reports pt has SOB. Only able to walk about 6 steps. Wife denies vomiting, hematuria, diarrhea, blood in stool or fever. Denies chest pain, facial and leg numbness/ tingling. Advised wife have pt seen at ED for further evaluation. She verbalized understanding and agreement with plan.

## 2017-01-02 NOTE — IP AVS SNAPSHOT
"          [place] [address] ONCOLOGY: [phone]                                              INTERAGENCY TRANSFER FORM - LAB / IMAGING / EKG / EMG RESULTS   2017                    Hospital Admission Date: 2017  DAVID BINGHAM   : 1933  Sex: Male        Attending Provider: Agueda Garcia MD     Allergies:  No Known Allergies    Infection:  None   Service:  HOSPITALIST    Ht:  1.702 m (5' 7\")   Wt:  74.481 kg (164 lb 3.2 oz)   Admission Wt:  72 kg (158 lb 11.7 oz)    BMI:  25.71 kg/m 2   BSA:  1.88 m 2            Patient PCP Information     Provider PCP Type    Flip Prakash MD General         Lab Results - 3 Days (17 - 17)      Basic metabolic panel [[id number]] (Abnormal)  Resulted: 17 0807, Result status: Final result    Ordering provider: Papo Ochoa MD  17 0000 Resulting lab: [facility]    Specimen Information    Type Source Collected On   Blood  17 0730          Components       Value Reference Range Flag Lab   Sodium 142 133 - 144 mmol/L  FrStHsLb   Potassium 3.7 3.4 - 5.3 mmol/L  FrStHsLb   Chloride 109 94 - 109 mmol/L  FrStHsLb   Carbon Dioxide 25 20 - 32 mmol/L  FrStHsLb   Anion Gap 8 3 - 14 mmol/L  FrStHsLb   Glucose 117 70 - 99 mg/dL H FrStHsLb   Urea Nitrogen 17 7 - 30 mg/dL  FrStHsLb   Creatinine 1.18 0.66 - 1.25 mg/dL  FrStHsLb   GFR Estimate 59 >60 mL/min/1.7m2 L FrStHsLb   Comment:  Non  GFR Calc   GFR Estimate If Black 71 >60 mL/min/1.7m2  FrStHsLb   Comment:  African American GFR Calc   Calcium 8.5 8.5 - 10.1 mg/dL  FrStHsLb            Hemoglobin and hematocrit [[id number]] (Abnormal)  Resulted: 17 0740, Result status: Final result    Ordering provider: Papo Ochoa MD  17 0000 Resulting lab: [facility]    Specimen Information    Type Source Collected On   Blood  17 3913          Components       Value Reference Range Flag Lab   Hemoglobin 9.0 13.3 - " 17.7 g/dL L FrStHsLb   Hematocrit 27.4 40.0 - 53.0 % L FrStHsLb            Urine Culture Aerobic Bacterial [[id number]] (Abnormal)  Resulted: 01/03/17 2340, Result status: Final result    Ordering provider: Trierweiler, Chad A, MD  01/02/17 2058 Resulting lab: [facility]    Specimen Information    Type Source Collected On   Urine  01/02/17 2011          Components       Value Reference Range Flag Lab   Specimen Description Midstream Urine   47694   Special Requests Specimen received in preservative   75   Culture Micro >100,000 colonies/mL Proteus mirabilis  A 225   Micro Report Status FINAL 01/03/2017   225   Organism: >100,000 colonies/mL Proteus mirabilis   225            Clostridium difficile toxin B PCR [[id number]]  Resulted: 01/03/17 2220, Result status: Final result    Ordering provider: Papo Ochoa MD  01/03/17 1414 Resulting lab: [facility]    Specimen Information    Type Source Collected On   Stool  01/03/17 1400          Components       Value Reference Range Flag Lab   Specimen Description Feces   FrStHsLb   C Diff Toxin B PCR -- NEG   75   Result:         Negative  Negative: Clostridium difficile target DNA sequences NOT detected, presumed   negative for Clostridium difficile toxin B or the number of bacteria present   may be below the limit of detection for the test.   FDA approved assay performed using An[employer] GeneXpert real-time PCR.   A negative result does not exclude actual disease due to Clostridium difficile   and may be due to improper collection, handling and storage of the specimen or   the number of organisms in the specimen is below the detection limit of the   assay.              Hemoglobin and hematocrit [[id number]] (Abnormal)  Resulted: 01/03/17 1606, Result status: Final result    Ordering provider: Papo Ochoa MD  01/03/17 1425 Resulting lab: [facility]    Specimen Information    Type Source  Collected On   Blood  01/03/17 1545          Components       Value Reference Range Flag Lab   Hemoglobin 8.8 13.3 - 17.7 g/dL L FrStHsLb   Hematocrit 26.9 40.0 - 53.0 % L FrStHsLb            Folate [[id number]]  Resulted: 01/03/17 1317, Result status: Final result    Ordering provider: Agueda Garcia MD  01/03/17 0740 Resulting lab: [facility]    Specimen Information    Type Source Collected On     01/03/17 0740          Components       Value Reference Range Flag Lab   Folate 35.9 >5.4 ng/mL  51            Ferritin [[id number]] (Abnormal)  Resulted: 01/03/17 0927, Result status: Final result    Ordering provider: Agueda Garcia MD  01/03/17 0740 Resulting lab: [facility]    Specimen Information    Type Source Collected On     01/03/17 0740          Components       Value Reference Range Flag Lab   Ferritin 532 26 - 388 ng/mL H FrStHsLb            CK total [[id number]]  Resulted: 01/03/17 0923, Result status: Final result    Ordering provider: Agueda Garcia MD  01/03/17 0740 Resulting lab: [facility]    Specimen Information    Type Source Collected On     01/03/17 0740          Components       Value Reference Range Flag Lab   CK Total 31 30 - 300 U/L  FrStHsLb            Iron and iron binding capacity [[id number]] (Abnormal)  Resulted: 01/03/17 0923, Result status: Final result    Ordering provider: Agueda Garcia MD  01/03/17 0740 Resulting lab: [facility]    Specimen Information    Type Source Collected On     01/03/17 0740          Components       Value Reference Range Flag Lab   Iron 18 35 - 180 ug/dL L FrStHsLb   Iron Binding Cap 228 240 - 430 ug/dL L FrStHsLb   Iron Saturation Index 8 15 - 46 % L FrStHsLb            Vitamin B12 [[id number]]  Resulted: 01/03/17 0906, Result status: In process    Ordering provider: Agueda Garcia MD  01/03/17 0740 [facility]    Specimen Information     Type Source Collected On     01/03/17 0740            Basic metabolic panel [[id number]] (Abnormal)  Resulted: 01/03/17 0803, Result status: Final result    Ordering provider: Agueda Garcia MD  01/03/17 0000 Resulting lab: [facility]    Specimen Information    Type Source Collected On   Blood  01/03/17 0740          Components       Value Reference Range Flag Lab   Sodium 142 133 - 144 mmol/L  FrStHsLb   Potassium 3.8 3.4 - 5.3 mmol/L  FrStHsLb   Chloride 107 94 - 109 mmol/L  FrStHsLb   Carbon Dioxide 25 20 - 32 mmol/L  FrStHsLb   Anion Gap 10 3 - 14 mmol/L  FrStHsLb   Glucose 109 70 - 99 mg/dL H FrStHsLb   Urea Nitrogen 24 7 - 30 mg/dL  FrStHsLb   Creatinine 1.31 0.66 - 1.25 mg/dL H FrStHsLb   GFR Estimate 52 >60 mL/min/1.7m2 L FrStHsLb   Comment:  Non  GFR Calc   GFR Estimate If Black 63 >60 mL/min/1.7m2  FrStHsLb   Comment:  African American GFR Calc   Calcium 8.3 8.5 - 10.1 mg/dL L FrStHsLb            CBC with platelets [[id number]] (Abnormal)  Resulted: 01/03/17 0751, Result status: Final result    Ordering provider: Agueda Garcia MD  01/03/17 0000 Resulting lab: [facility]    Specimen Information    Type Source Collected On   Blood  01/03/17 0740          Components       Value Reference Range Flag Lab   WBC 9.9 4.0 - 11.0 10e9/L  FrStHsLb   RBC Count 2.82 4.4 - 5.9 10e12/L L FrStHsLb   Hemoglobin 8.8 13.3 - 17.7 g/dL L FrStHsLb   Hematocrit 26.9 40.0 - 53.0 % L FrStHsLb   MCV 95 78 - 100 fl  FrStHsLb   MCH 31.2 26.5 - 33.0 pg  FrStHsLb   MCHC 32.7 31.5 - 36.5 g/dL  FrStHsLb   RDW 14.5 10.0 - 15.0 %  FrStHsLb   Platelet Count 258 150 - 450 10e9/L  FrStHsLb            *UA reflex to Microscopic [[id number]] (Abnormal)  Resulted: 01/02/17 2054, Result status: Final result    Ordering provider: Trierweiler, Chad A, MD  01/02/17 2011 Resulting lab: [facility]    Specimen Information    Type Source Collected On     01/02/17 2011           Components       Value Reference Range Flag Lab   Color Urine Yellow   55143   Appearance Urine Cloudy   80855   Glucose Urine Negative NEG mg/dL  25576   Bilirubin Urine Negative NEG   99220   Ketones Urine Negative NEG mg/dL  23443   Specific [place] Urine 1.015 1.003 - 1.035   99323   Blood Urine Large NEG  A 81961   pH Urine 8.5 5.0 - 7.0 pH H 41594   Protein Albumin Urine >=300 NEG mg/dL A 16407   Urobilinogen Urine 0.2 0.2 - 1.0 EU/dL  71996   Nitrite Urine Positive NEG  A 01589   Leukocyte Esterase Urine Small NEG  A 27616   Source Midstream Urine   64031            Urine Microscopic [[id number]] (Abnormal)  Resulted: 01/02/17 2054, Result status: Final result    Ordering provider: Trierweiler, Chad A, MD  01/02/17 2011 Resulting lab: [facility]    Specimen Information    Type Source Collected On     01/02/17 2011          Components       Value Reference Range Flag Lab   WBC Urine 25-50 0 - 2 /HPF A 87128   RBC Urine 5-10 0 - 2 /HPF A 73418   Bacteria Urine Moderate NEG /HPF A 22954   Triple Phosphates Moderate NEG /HPF A 22976            Comprehensive metabolic panel [[id number]] (Abnormal)  Resulted: 01/02/17 1944, Result status: Final result    Ordering provider: Trierweiler, Chad A, MD  01/02/17 1856 Resulting lab: [facility]    Specimen Information    Type Source Collected On   Blood  01/02/17 1911          Components       Value Reference Range Flag Lab   Sodium 139 133 - 144 mmol/L  FrStHsLb   Potassium 3.8 3.4 - 5.3 mmol/L  FrStHsLb   Chloride 104 94 - 109 mmol/L  FrStHsLb   Carbon Dioxide 24 20 - 32 mmol/L  FrStHsLb   Anion Gap 11 3 - 14 mmol/L  FrStHsLb   Glucose 120 70 - 99 mg/dL H FrStHsLb   Urea Nitrogen 32 7 - 30 mg/dL H FrStHsLb   Creatinine 1.37 0.66 - 1.25 mg/dL H FrStHsLb   GFR Estimate 50 >60 mL/min/1.7m2 L FrStHsLb   Comment:  Non  GFR Calc   GFR Estimate If Black 60 >60 mL/min/1.7m2 L FrStHsLb   Comment:   GFR Calc    Calcium 8.8 8.5 - 10.1 mg/dL  FrStHsLb   Bilirubin Total 0.4 0.2 - 1.3 mg/dL  FrStHsLb   Albumin 2.7 3.4 - 5.0 g/dL L FrStHsLb   Protein Total 7.1 6.8 - 8.8 g/dL  FrStHsLb   Alkaline Phosphatase 78 40 - 150 U/L  FrStHsLb   ALT 39 0 - 70 U/L  FrStHsLb   AST 27 0 - 45 U/L  FrStHsLb            CBC with platelets differential [[id number]] (Abnormal)  Resulted: 01/02/17 1927, Result status: Final result    Ordering provider: Trierweiler, Chad A, MD  01/02/17 1856 Resulting lab: [facility]    Specimen Information    Type Source Collected On   Blood  01/02/17 1911          Components       Value Reference Range Flag Lab   WBC 9.9 4.0 - 11.0 10e9/L  FrStHsLb   RBC Count 2.95 4.4 - 5.9 10e12/L L FrStHsLb   Hemoglobin 9.3 13.3 - 17.7 g/dL L FrStHsLb   Hematocrit 28.4 40.0 - 53.0 % L FrStHsLb   MCV 96 78 - 100 fl  FrStHsLb   MCH 31.5 26.5 - 33.0 pg  FrStHsLb   MCHC 32.7 31.5 - 36.5 g/dL  FrStHsLb   RDW 14.5 10.0 - 15.0 %  FrStHsLb   Platelet Count 279 150 - 450 10e9/L  FrStHsLb   Diff Method Automated Method   FrStHsLb   % Neutrophils 71.4 %  FrStHsLb   % Lymphocytes 18.2 %  FrStHsLb   % Monocytes 7.5 %  FrStHsLb   % Eosinophils 2.5 %  FrStHsLb   % Basophils 0.1 %  FrStHsLb   % Immature Granulocytes 0.3 %  FrStHsLb   Nucleated RBCs 0 0 /100  FrStHsLb   Absolute Neutrophil 7.1 1.6 - 8.3 10e9/L  FrStHsLb   Absolute Lymphocytes 1.8 0.8 - 5.3 10e9/L  FrStHsLb   Absolute Monocytes 0.7 0.0 - 1.3 10e9/L  FrStHsLb   Absolute Eosinophils 0.3 0.0 - 0.7 10e9/L  FrStHsLb   Absolute Basophils 0.0 0.0 - 0.2 10e9/L  FrStHsLb   Abs Immature Granulocytes 0.0 0 - 0.4 10e9/L  FrStHsLb   Absolute Nucleated RBC 0.0   FrStHsLb            Testing Performed By     Lab - Abbreviation Name Director Address Valid Date Range    14 - FrStHsLb [facility] Unknown [address] [place] [address] 05/08/15 1057 - Present    45 - FHZ329 M[facility] Unknown Unknown 01/28/02 0000 - Present    51 - Unknown [facility] Unknown [address] [facility] [address] 12/31/14 1010 - Present    75 - Unknown [facility] Unknown [address] [facility] [address] 01/15/15 1019 - Present    225 - Unknown [facility] Unknown [address] [facility] [address] 12/19/14 0954 - Present    [id number] - Unknown [facility] Unknown [address] [place] [address] 07/23/15 1146 - Present            Unresulted Labs     None      Encounter-Level Documents:     There are no encounter-level documents.      Order-Level Documents:     There are no order-level documents.

## 2017-01-03 NOTE — H&P
PRIMARY CARE PHYSICIAN:  Flip Prakash MD      PRIMARY UROLOGIST:  Flip Porter MD      CHIEF COMPLAINT:  Generalized weakness.      HISTORY OF PRESENT ILLNESS:  This has been obtained partially from the patient, and from his wife who was present at the time of my examination and provided accurate information, and I also reviewed his chart extensively.  Mr. Brian Hernandez is an extremely pleasant 83-year-old gentleman with past medical history of coronary artery disease, status post multiple stents placed, history of hypertension, dyslipidemia, CVA, bladder cancer, status post repeat cystoscopies with transurethral resection of tumors, status post chemotherapy and radiation therapy, history of paroxysmal atrial fibrillation, off anticoagulation, history of stroke, chronic kidney disease stage III, colorectal cancer, status post surgery and chemotherapy and radiation therapy, who came in for evaluation of generalized weakness.  The patient had an admission in 08/2016 due to a mechanical fall that resulted in a right-sided periprosthetic hip fracture.  He underwent ORIF of the right hip, and he was discharged to a transitional care unit, [facility].  It seems that he was in the TCU for 77 days, and he was sent back home at the end of 11/2016.  After returning home, he was doing relatively well, although still feeling weak.  He was having physical therapy visiting him at home twice a week.  It seems that in the last couple of weeks also, he became progressively more weak, having trouble getting up the stairs.  The last time that PT saw him at home was last Friday, and he was told that because he was so weak, he could not really participate in PT, so they stopped these visits.      Upon further questioning, the patient confirms that he feels very weak, very tired.  He feels winded with minimal activity.  He denies any fever, no coughing, no cold symptoms, no sweats or chills, no chest pain, no headaches.  He denies  any nausea, no vomiting, no diarrhea, no recent constipation.  He does report having some lower abdominal pain, and increased urinary frequency, especially at night; no real dysuria, and no hematuria seen.  He denies leg swelling.      The patient has a history of extensive tumors of the bladder for which he underwent cystoscopy with transurethral resection of the bladder tumor three times; in 01/2016, 04/2016, and repeat one in 12/2016.  The biopsy showed high-grade papillary urothelial carcinoma.  He was seen by Oncology, and he underwent chemotherapy and radiation therapy.  Last cystoscopy with transurethral resection of the large bladder tumor was done on 12/10/2016.  The Wilkins catheter was left in place for one week, and removed on 12/19/2016.      In the ER, the patient was seen by Dr. Trierweiler.  His initial vitals showed blood pressure of 124/62, heart rate was 84, respiratory rate 16, oxygen saturation 96% on room air, and temperature was 99.7.  He did have basic blood work which showed a BMP with elevated BUN of 32, creatinine 1.37.  Liver function tests within normal limits.  Glucose was 120.  CBC with no leukocytosis, hemoglobin 9.3, hematocrit 28.4, and normal platelet count.      Urinalysis was grossly abnormal with white blood cells 25-50, positive nitrites, small leukocyte esterase, large blood, moderate bacteria.  Urine culture was sent from ER.      Because of evidence of urinary tract infection, he was given one dose of ceftriaxone and one liter of normal saline, and Hospitalist Service was called regarding the admission.      PAST MEDICAL HISTORY:   1.  History of hypertension.   2.  History of dyslipidemia.   3.  History of stroke.   4.  History of severe left carotid artery stenosis, status post left carotid endarterectomy in 03/2014.   5.  History of colorectal cancer, status post surgery, chemotherapy and radiation therapy.   6.  History of paroxysmal atrial fibrillation.  It seems that he  was on Eliquis for a while, but he developed hemoptysis, and was taken off anticoagulation.   7.  History of coronary artery disease, status post myocardial infarction in the past.  He had two stents placed in , and three stents in .  Last cardiac catheterization, in 10/2010, showed patent stents, but there was some narrowing of the circumflex proximal to the stent.   8.  Chronic kidney disease, stage III, baseline creatinine between 1.2 and 1.4.   9.  Bladder cancer, status post repeated cystoscopies, with transurethral resections of large bladder tumors.  Last procedure was on 12/10/2016 by Dr. Porter.  He also underwent chemotherapy and radiation therapy for the bladder tumors.   10.  History of vasovagal syncope.   11.  Pulmonary hypertension.   12.  History of constipation.      PAST SURGICAL HISTORY:   1.  Multiple angiograms.  In , he had two drug-eluting stents placed to LAD.  Repeat angiogram in 2004 showed patent prior stents.  Repeat angiogram in 2008 with balloon angioplasty and three stents placed to occluded right coronary artery, and stenting of circumflex artery.   2.  Status post left carotid endarterectomy in 2014.  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   3.  Eye surgery for cataract.   4.  Status post hemicolectomy.   5.  Status post lung surgery for nodule resection.   6.  Bilateral hip replacement.   7.  Open reduction internal fixation of the right hip for right periprosthetic hip fracture in 2016.   8.  Status post three urological procedures of cystoscopy with transurethral resection of tumor of bladder in 2016, 2016, and 2016.      SOCIAL HISTORY:  The patient used to smoke, but he quit smoking in .  He denies illicit drug abuse.  He reports drinking one beer or one shot of octavio daily.      FAMILY HISTORY:  Reviewed with the patient and his wife.  It seems that his father  when he  was young.  His mother  because of advanced age.  Otherwise, no remarkable family history.      PRIOR TO ADMISSION MEDICATIONS:   1.  Senna 2 tablets by mouth twice daily.   2.  Iron sulfate 325 mg p.o. twice daily.   3.  MiraLax 17 grams by mouth daily p.r.n.   4.  Labetalol 300 mg by mouth twice daily.   5.  Lipitor 20 mg by mouth every evening.   6.  Multivitamin 1 tablet p.o. daily.   7.  Vitamin D 2000 units p.o. daily.      ALLERGIES:  He does not have any known drug allergies.      REVIEW OF SYSTEMS:  A 10-point review of systems was conducted and it was negative except for pertinent positives mentioned in the history of present illness.      PHYSICAL EXAM:   VITALS:  Blood pressure is 147/81, heart rate 84, respiratory rate 16, oxygen saturation 94% on room air.  T-max was 99.7.   GENERAL:  The patient is awake, alert, in no acute distress at the time of my examination, but he looks fatigued.   HEENT:  Head is normocephalic, atraumatic.  Pupils are equal, round, and reactive to light.  Oral mucosa is moist.   NECK:  Supple.  No cervical lymphadenopathy.  No thyromegaly.   CHEST:  There is bilateral air entry.  No wheezing, no rales, no crackles.   CARDIOVASCULAR:  There is normal S1 and S2, regular rate and rhythm.  No murmurs, no rubs.   ABDOMEN:  Soft.  Mildly tender to palpation in lower abdomen.  No rebound, no guarding.  Bowel sounds are present.   EXTREMITIES:  There is no leg swelling, no calf tenderness.  2+ peripheral pulses are palpable.   SKIN:  Intact.  No rashes.  No cyanosis.   NEUROLOGICAL:  The patient is awake, alert.  Oriented to self, place and time.  There are no new neurological deficits.  Sensory is grossly intact.   PSYCHIATRIC:  Normal mood, normal affect.   MUSCULOSKELETAL:  He does not seem to have any obvious joint deformities, no swelling, no joint rashes.      LABS:  Sodium 139, potassium 3.9, chloride 104, bicarbonate 24, BUN 32, creatinine 1.37, calcium 8.8, anion gap 11,  albumin 2.7, total protein 7.1, total bilirubin 0.4, alkaline phosphatase 78, ALT 39, AST 27.  Glucose is 120.  White blood cells 9.9, hemoglobin 9.3, hematocrit 28.4, platelet count 279,000.      UA was abnormal with white blood cells 25-50, small leukocyte esterase, positive nitrites, large blood, moderate bacteria.  Urine culture is pending.      ASSESSMENT:  Mr. 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      PLAN:   1.  Urinary tract infection:  He had a recent cystoscopy with transurethral resection of bladder cancer on 12/10/2016.  Wilkins catheter was kept in place for one week, and removed, I think, on 12/19/2016.  He comes in now with generalized worsening weakness, suspected due to urinary tract infection.  Urinalysis is grossly abnormal with white blood cells 25-50, large blood, positive nitrites, small leukocyte esterase, moderate bacteria.  He does not have fever and no leukocytosis.  He received one dose of ceftriaxone in the ER.  I will continue ceftriaxone 1 gram IV daily for now.  We will follow up urine culture.  We will mildly hydrate him with normal saline at 50 cc per hour for now, and closely monitor his fluid status.   2.  Generalized weakness:  Suspected multifactorial, but current urinary tract  infection likely contributing to worsening weakness.  We will try to treat UTI as per #1.  We will provide mild IV hydration.  We will ask PT and OT to assess the patient.  I also put in a consult for Social Work, as I anticipate that the patient may need a TCU at the time of discharge.   3.  History of hypertension:  His blood pressure for now seems to be well controlled.  We will continue with his prior to admission labetalol 300 mg p.o. twice daily.   4.  History of dyslipidemia:  Continue with prior to admission Lipitor 20 mg p.o. every evening.   5.  History of coronary artery disease, status post multiple stents placed in the past:  This does not seem to be an acute issue at this time.  He is not on aspirin.  We will continue with his prior to admission beta blocker and statin.   6.  History of bladder cancer, status post multiple cystoscopies with transurethral resections of large bladder tumors, with last one on 12/10/2016 by Dr. Porter:  He is also status post chemotherapy and radiation therapy.  He will follow up with Dr. Porter.   7.  History of paroxysmal atrial fibrillation:  I think he was on Eliquis at some point in the past, now off anticoagulation because he developed hemoptysis in the past.   8.  Chronic kidney disease, stage III:  Most recent creatinine varied between 1.2 and 1.4.  Currently his creatinine is 1.37, around his baseline.  We will closely monitor his kidney function.  We will avoid nephrotoxic drugs.   9.  Ischemic systolic cardiomyopathy, with echocardiogram in 05/2015 showing ejection fraction of 45-50%:  He seems euvolemic at this time.  He is not on any diuretics at home.  We will mildly hydrate him with normal saline at 50 cc per hour, and we will closely monitor his fluid status since it seems that during his admission in 08/2016, he developed respiratory distress due to volume overload.   10.  History of constipation.  We will continue his prior to admission scheduled Ingris  and MiraLax p.r.n.   11.  DVT prophylaxis:  Pneumatic compression device.   12.  CODE STATUS:  Discussed with the patient and wife.  The patient is DNR/DNI.   13.  Disposition:  I anticipate at least two days of hospitalization, pending clinical improvement.         MASOUD SILVA MD             D: 2017 01:30   T: 2017 03:04   MT: [id number]      Name:     DAVID BINGHAM   MRN:      [id number]        Account:      [id number]   :      1933           Admitted:     [id number]      Document: [id number]       cc: Flip Porter MD

## 2017-01-03 NOTE — PLAN OF CARE
Problem: Goal Outcome Summary  Goal: Goal Outcome Summary  PT and OT: Received eval orders.  Pt admitted last night with UTI.  Will defer therapy evals this date to allow pt to medically stabilize prior to assessment.

## 2017-01-03 NOTE — PROGRESS NOTES
[facility]    Hospitalist Progress Note    Date of Service (when I saw the patient): 01/03/2017    Assessment and Plan    Mr. 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        1.  Urinary tract infection:  He had a recent cystoscopy with transurethral resection of bladder cancer on 12/10/2016.  Wilkins catheter was kept in place for one week, and removed, on 12/19/2016.  He was advised to come to ER in now with generalized worsening weakness, suspected due to urinary tract infection.   - Urinalysis on admission -grossly abnormal with white blood cells 25-50, large blood, positive nitrites, small leukocyte esterase, moderate bacteria. But he does not have fever and no leukocytosis.  - He received one dose of ceftriaxone in the ER.   - Continue ceftriaxone 1 gram IV daily for now and follow up urine culture.   - Gently hydrate him with normal saline at 50 cc per hour for now, and closely monitor his fluid status.      2.  Generalized weakness:  Suspected multifactorial, but current urinary tract infection likely contributing to worsening weakness. Treat UTI as per #1.    - Gentle IV hydration.    - PT and OT to assess the patient, consult for Social Work, as I anticipate that the patient may need a TCU at the  time of discharge.      3.  History of hypertension:  His blood pressure for now seems to be well controlled.  We will continue with his prior to admission labetalol 300 mg p.o. twice daily.      4.  History of dyslipidemia:  Hold PTA daily Lipitor 20 mg p.o.   - check CK    5.  History of coronary artery disease, status post multiple stents placed in the past:  This does not seem to be an acute issue at this time.  He is not on aspirin.    - continue with his prior to admission beta blocker     6.  History of bladder cancer, status post multiple cystoscopies with transurethral resections of large bladder tumors, with last one on 12/10/2016 by Dr. Porter:  He is also status post chemotherapy and radiation therapy.  He will follow up with Dr. Porter.      7.  History of paroxysmal atrial fibrillation:  I think he was on Eliquis at some point in the past, now off anticoagulation because he developed hemoptysis in the past.      8.  Chronic kidney disease, stage III:  Most recent creatinine varied between 1.2 and 1.4.  Currently his creatinine is 1.37, around his baseline.  We will closely monitor his kidney function.  We will avoid nephrotoxic drugs.      9.  Ischemic systolic cardiomyopathy, with echocardiogram in 05/2015 showing ejection fraction of 45-50%:  He seems euvolemic at this time.  He is not on any diuretics at home.  We will mildly hydrate him with normal saline at 50 cc per hour, and we will closely monitor his fluid status since it seems that during his admission in 08/2016, he developed respiratory distress due to volume overload.      10.  History of constipation.  We will continue his prior to admission scheduled Senna, and MiraLax p.r.n.      DVT Prophylaxis: Pneumatic Compression Devices  Code Status: DNR/DNI    Disposition: Expected discharge in 1-2 days once urine culture available, evaluated by therapy and safe dispo plan made. Discussed with his wife at length.    Papo Ochoa,  MD  hospitalist    Interval History  Patient was seen and examined, reports increased urinary    -Data reviewed today: I reviewed all new labs and imaging results over the last 24 hours. I personally reviewed no images or EKG's today.    Physical Exam  Temp: 97.8  F (36.6  C) Temp src: Oral BP: 149/70 mmHg Pulse: 78 Heart Rate: 75 Resp: 16 SpO2: 95 % O2 Device: None (Room air)    Filed Vitals:    01/02/17 2247 01/03/17 0614   Weight: 72 kg (158 lb 11.7 oz) 72 kg (158 lb 11.7 oz)     Vital Signs with Ranges  Temp:  [97.8  F (36.6  C)-99.7  F (37.6  C)] 97.8  F (36.6  C)  Pulse:  [78-84] 78  Heart Rate:  [75-78] 75  Resp:  [16] 16  BP: (124-160)/(57-83) 149/70 mmHg  SpO2:  [92 %-97 %] 95 %  I/O last 3 completed shifts:  In: 1373 [I.V.:373; IV Piggyback:1000]  Out: -     Constitutional: Alert, awake. Not in distress.   HEENT PERRLA, EOMI, mucosa dry  Respiratory: Bilateral equal air entry, clear to auscultation. No respiratory distress.  Cardiovascular: Regular s1s2, no murmur, rub or gallop. No tachycardia.  GI: Soft, non distended, non tender, bowel tones active.  Skin/Integumen: No rash, no blister.  Other:      Medications    NaCl 50 mL/hr at 01/02/17 2253       atorvastatin (LIPITOR) tablet 20 mg  20 mg Oral QPM     ferrous sulfate  325 mg Oral BID     labetalol  300 mg Oral BID     sennosides  2 tablet Oral BID     cefTRIAXone  1 g Intravenous Q24H     sodium chloride (PF)  3 mL Intracatheter Q8H       Data    Recent Labs  Lab 01/03/17  0740 01/02/17  1911   WBC 9.9 9.9   HGB 8.8* 9.3*   MCV 95 96    279    139   POTASSIUM 3.8 3.8   CHLORIDE 107 104   CO2 25 24   BUN 24 32*   CR 1.31* 1.37*   ANIONGAP 10 11   TOR 8.3* 8.8   * 120*   ALBUMIN  --  2.7*   PROTTOTAL  --  7.1   BILITOTAL  --  0.4   ALKPHOS  --  78   ALT  --  39   AST  --  27       No results found for this or any previous visit (from the past 24 hour(s)).

## 2017-01-03 NOTE — ED NOTES
"[facility]  ED Nurse Handoff Report    ED Chief complaint: Generalized Weakness      ED Diagnosis:   Final diagnoses:   None       Code Status: Full Code per prior status, not addressed at this time    Allergies: No Known Allergies    Activity level:  Has stood at bedside but has not ambulated, stable with standing, has been increasingly weak at home     Needed?: No    Isolation: No  Infection: Not Applicable    Bariatric?: No      Vital Signs:   Filed Vitals:    01/02/17 1704 01/02/17 1836 01/02/17 1837   BP: 124/62 146/79    Pulse: 84     Temp: 99.7  F (37.6  C)     TempSrc: Oral     Resp: 16     Height: 1.702 m (5' 7\")     SpO2: 96%  94%       Cardiac Rhythm: ,        Pain level: 0-10 Pain Scale: 2    Is this patient confused?: Yes, needs frequent cues on what to do    Patient Report: Initial Complaint: weakness  Focused Assessment: Had ORIF femur, was at rehab facility for 77 days, back at home was unable to participate in home PT d/t increased weakness which is getting worse.   Hx bladder CA.  Wife also reports poor PO intake.  Tests Performed: Blood drawn, urine sample sent  Abnormal Results: see results, urine in process  Treatments provided: 1 L NS    Family Comments: wife at bedside    OBS brochure/video discussed/provided to patient: No    ED Medications:   Medications   sodium chloride (PF) 0.9% PF flush 3 mL (not administered)   sodium chloride (PF) 0.9% PF flush 3 mL (not administered)   0.9% sodium chloride BOLUS (1,000 mLs Intravenous New Bag 1/2/17 1910)     Followed by   0.9% sodium chloride infusion (not administered)       Drips infusing?:  No      ED NURSE PHONE NUMBER: [phone]           "

## 2017-01-03 NOTE — CONSULTS
UROLOGY INPATIENT CONSULTATION      REASONS FOR CONSULTATION:  Urinary tract infection and history of bladder cancer.      HISTORY OF PRESENT ILLNESS:  Brian Hernandez is an 83-year-old gentleman who is a patient of my partner, Dr. Porter.  He has been seen by Dr. Porter for a T1 high grade urothelial carcinoma of the bladder.  He recently underwent a restaging resection and was found to have residual high-grade tumor, but it was noninvasive.  He had consultations with Oncology and Radiation Oncology and it has been recommended he have a course of external beam radiation and chemotherapy.  That recent resection was on 12/09.  He was seen back in the clinic by Dr. Porter on 12/19 for discussion of his pathology.  At that time, he was voiding adequately.      Mr. Hernandez returned to the Emergency Department, brought in by his family for generalized weakness and inability to care for himself.  He was found to have a urinary tract infection and he was admitted for treatment and also for social work to work on placement issues.  His urine culture is pending at this time and he is currently on IV Rocephin as we await urine culture results.  He has been voiding without a catheter and is leaking into pads.      PAST MEDICAL HISTORY:  Coronary artery disease, hypertension, hyperlipidemia, cerebrovascular accident and atrial fibrillation.      PAST SURGICAL HISTORY:  Recent bladder tumor resections as above.      HOME MEDICATIONS:  Ditropan, senna, Lipitor.      ALLERGIES:  NKDA.      SOCIAL HISTORY:  He is a former smoker.  He currently lives alone, but they are working on placement issues at this time.      FAMILY HISTORY:  No urologic malignancies.      REVIEW OF SYSTEMS:  Negative other than what is mentioned in the HPI.  Overall has been feeling weak and fatigued.      PHYSICAL EXAMINATION:   VITAL SIGNS:  Afebrile.  Vital signs stable.   GENERAL:  He has dementia and cannot provide a history.   HEENT:  Normocephalic  and atraumatic.   LUNGS:  Normal amount of labored breathing.   GASTROINTESTINAL:  His abdomen is soft, nontender, nondistended.   GENITOURINARY:  I do not feel a distended bladder.  He is wearing pads currently.      LABORATORY STUDIES:  Urine is nitrite positive and urine culture is pending.      IMPRESSION AND PLAN:  An 83-year-old gentleman with history of bladder cancer and a urinary tract infection.  I agree with the plan to keep him on intravenous Rocephin as we await urine cultures.  Ultimately, he will need to be transitioned to oral antibiotics.  I will ask the nurses to scan the bladder to make sure he is not retaining urine, but at this time, it looks like he is voiding adequately.  I will let Dr. Porter know about the admission, but I think for now we will see that the patient receives a course of antibiotics and then he will stick with his plan to return to see Dr. Porter in March for his next screening cystoscopy in the office.         ROSA M CLARKE MD             D: 2017 15:14   T: 2017 15:50   MT: [id number]      Name:     DAVID BINGHAM   MRN:      [id number]        Account:       [id number]   :      1933           Consult Date:  2017      Document: [id number]

## 2017-01-03 NOTE — PLAN OF CARE
Problem: Goal Outcome Summary  Goal: Goal Outcome Summary  Outcome: No Change  A&Ox 4, [place]. VSS.  SBA, ambulated in room.  Incontinent of blood tinged urine and dark loose stool, MD aware, C.diff specimen collected, results pending.  Enteric precautions.  Tolerating regular diet.  Plan pending urine culture results.

## 2017-01-03 NOTE — PROVIDER NOTIFICATION
MD Notification    Notified Person:  MD    Notified Persons Name: Dr. Ochoa    Notification Date/Time: 1/3/2017 1420    Notification Interaction:  Text paged physician    Purpose of Notification: Blood tinged urine and incontinent of loose dark stool.        Urology consult placed, Occult blood sample collection order placed.

## 2017-01-03 NOTE — PLAN OF CARE
Problem: Goal Outcome Summary  Goal: Goal Outcome Summary  Outcome: No Change  A/O. VSS. Denied pain. Slept throughout shift. Up with assist of 1. PCD on. Incontinent of urine x1 throughout shift. IVF infusing. Urine culture still pending. On abx.

## 2017-01-03 NOTE — PLAN OF CARE
Problem: Goal Outcome Summary  Goal: Goal Outcome Summary  Outcome: No Change  Pt arrived around 2240. Up with SBA, some weakness with ambulation. IVF infusing. PCDs on. Urinary frequency, incontinent at times. Plan to continue abx. Continue to monitor.

## 2017-01-03 NOTE — ED PROVIDER NOTES
History     Chief Complaint:  Generalized Weakness      HPI   Brian Hernandez is a 83 year old male with history of bladder cancer, CAD, hypertension, hyperlipidemia, CVA, atrial fibrillation who presents to the emergency department today with generalized weakness. In August of 2016 the patient fractured his hip and he underwent a total hip arthroplasty and femur ORIF and he was then in at TCU for 77 days and following this he has had residual weakness. On 12/09/16 the patient had a cystoscopy and transurethral resection tumor bladder performed secondary to bladder cancer by Dr. Porter. Today, the patient states that he feels fatigued and tired. His family states that the patient has be progressive weakness and fatigue with shortness of breath with exertion since being discharged. His family states that on Friday 12/30/16 the patient had at home physical therapy and the therapist ceased therapy because the patient was too weak. His son states that the patient is able to ambulate to the bathroom and is able to walk up 6 steps. The has had difficulty getting to the bathroom has he is incontinent of urine and during the night he frequently gets up to use the bathroom.Today, the patient's wife states that she called the patient's primary care provider who recommended that he come to the ED. His wife notes that he has lost weight and his PO intake has been decreased. He denies fevers or chills. Denies nausea or vomiting. Denies cough.     Allergies:  No Known Drug Allergies      Medications:    Ditropan   Senokot   Normodyne   Lipitor      Past Medical History:    CAD  Hypertension   Hyperlipidemia   MI   GERD  Presbycusis   Atrial fibrillation   CVA  Bladder cancer   Peripheral vascular disease   Chronic constipation   Intermittent asthma   Rectal carcinoma   Pulmonary hypertension   Pulmonary nodules   Anema    Past Surgical History:     Vascular surgery, multiple stents  SUJIT bilateral  Endarterectomy  "carotid  Heart cath coronary angiogram x4   ORIF mandible  ORIF femur   Revision SUJIT 08/16  Arthroplasty   Cystoscopy, transurethral resection tumor bladder, combined - 12/09/16    Family History:    History reviewed. No pertinent family history.      Social History:  The patient was accompanied to the ED by wife and sons.  Smoking Status: Former smoker -- 1.00 packs/day 1.00 packs.day for 25 years - 01/01/1975  Smokeless Tobacco: Never used  Alcohol Use: Yes     Marital Status:        Review of Systems   Constitutional: Positive for fatigue. Negative for fever and chills.   Respiratory: Negative for cough.    Gastrointestinal: Negative for nausea and vomiting.   All other systems reviewed and are negative.    Physical Exam   First Vitals:  BP: 124/62 mmHg  Pulse: 84  Temp: 99.7  F (37.6  C)  Resp: 16  Height: 170.2 cm (5' 7\")  SpO2: 96 %    Physical Exam  General:  Elderly frail man resting comfortably on the bed.      Eye:  Pupils are equal, round, and reactive.  Extraocular movements intact.    ENT:  No rhinorrhea.  Moist mucus membranes.  Normal tongue and tonsil.    Cardiac:  Regular rate and rhythm.  No murmurs, gallops, or rubs.    Pulmonary:  Clear to auscultation bilaterally.  No wheezes, rales, or rhonchi.    Abdomen:  Positive bowel sounds.  Abdomen is soft and non-distended, without focal tenderness.    Musculoskeletal:  Normal movement of all extremities without evidence for deficit.    Skin:  Warm and dry without rashes.    Neurologic:  Non-focal exam without asymmetric weakness or numbness.    Psychiatric:  Normal affect with appropriate interaction with examiner.      Emergency Department Course       Laboratory:  Laboratory findings were communicated with the patient and family who voiced understanding of the findings.    CBC:WBC 9.9, HGB 9.3 (L),    CMP: Glucose 128 (H), BUN 32 (H), Creatinine 1.37 (H), GFR 50 (L), Albumin 2.7 (L)   Type and screen: ABO O, Rh Pos, Antibody screen Neg "     UA: Blood Large (A), pH 8.5 (H), Protein albumin >-300 (A), Nitrite Positive (A), Leukocyte esterase Small (A), WBC 25-50 (A), RBC 5-10 (A), Bacteria Moderate (A), Triple phosphates Moderate (A) o/w Negative   Urine culture aerobic bacteria: Pending       Interventions:  1910 NS 1000 mL IV    2110 Rocephin 1 g IV      Emergency Department Course:  Nursing notes and vitals reviewed.  I performed an exam of the patient as documented above.   IV was inserted and blood was drawn for laboratory testing, results above.   The patient provided a urine sample here in the emergency department. This was sent for laboratory testing, findings above.   2116: I spoke with Dr. Garcia of the hospitalist service regarding patient's presentation, findings, and plan of care.   I discussed the treatment plan with the patient. They expressed understanding of this plan and consented to admission. I discussed the patient with Dr. Garcia, who will admit the patient to a monitored bed for further evaluation and treatment.     Impression & Plan      Medical Decision Making:  Brian Hernandez is a 83 year old male wit a history of bladder cancer who presents to the emergency department today with progressive weakness over the last several weeks. He also describes having urinary frequency and having to get up many times during the night. His physical exam is unremarkable except for his deconditioned state. His blood work is reassuring, all essentially at baseline. However, his urineanalysis is positive for infection and I have to wonder if this is the leading cause for his malaise and weakness. The family notes that he is not doing well at home and he will require placement. I will admitted the patient for his UTI, treated with Rocephin and with further social work evaluation and plan for rehab stay after he is stabilized. I spoke with Dr. Garcia of the hospitalist service who agrees to accept car of the patient.     Diagnosis:    ICD-10-CM     1. Urinary tract infection, site unspecified N39.0 Urine Culture Aerobic Bacterial   2. Generalized muscle weakness M62.81       Scribe Disclosure:  I, Pepito Garcia, am serving as a scribe at 6:06 PM on 1/2/2017 to document services personally performed by Trierweiler, Chad A, MD, based on my observations and the provider's statements to me.   1/2/2017    EMERGENCY DEPARTMENT        Trierweiler, Chad A, MD  01/03/17 1043

## 2017-01-04 NOTE — PROGRESS NOTES
01/04/17 0800   Quick Adds   Type of Visit Initial PT Evaluation   Living Environment   Lives With spouse   Living Arrangements house   Home Accessibility tub/shower is not walk in   Number of Stairs to Enter Home 2  (2 rail)   Number of Stairs Within Home 14  (one rail)   Transportation Available family or friend will provide  (Sons provide transportation)   Self-Care   Usual Activity Tolerance moderate   Equipment Currently Used at Home walker, rolling  (tub bench)   Functional Level Prior   Ambulation 1-->assistive equipment  (FWW)   Transferring 1-->assistive equipment   Toileting 2-->assistive person  (occasional assist from wife)   Bathing 3-->assistive equipment and person  (tub bench and wife)   Dressing 2-->assistive person  (Assistance from wife sometimes)   Fall history within last six months yes   Number of times patient has fallen within last six months 1  (Patient reports he fell in August and needed a rehab stay)   Which of the above functional risks had a recent onset or change? ambulation;transferring   Prior Functional Level Comment Patient reports he is modified independent with mobility with a FWW. He reports he needs assist with ADL's. R UE coorindation/strength impaired from previous stroke.    General Information   Onset of Illness/Injury or Date of Surgery - Date 01/02/17   Referring Physician MD Jose   Patient/Family Goals Statement Return home   Pertinent History of Current Problem (include personal factors and/or comorbidities that impact the POC) 83 year old male admitted with weakness and found to have a UTI. PMH significant for HTN. Pt also reports a previous stroke about 2-3 years prior affecting the R UE/LE.    Precautions/Limitations fall precautions   General Info Comments Activity: ambulate with assist   Cognitive Status Examination   Orientation orientation to person, place and time   Level of Consciousness alert   Follows Commands and Answers Questions 100% of the time    Pain Assessment   Patient Currently in Pain (L sided abdominal pain)   Posture    Posture Not impaired   Range of Motion (ROM)   ROM Comment Decreased R UE AROM d/t previous stroke - pt does use this hand with functional mobility.    Strength   Strength Comments Sufficient for mobility as noted below.    Bed Mobility   Bed Mobility Comments Supine to sit with SBA.    Transfer Skills   Transfer Comments Sit to/from stand with CGA (performs from bed, standard height chair and toilet).    Gait   Gait Comments Ambulates 300 feet x 2 reps with FWW and CGA progressing to SBA. Decreased gait speed. Pt reports weakness compared to baseline. No LOB.    Balance   Balance Comments Requires bilateral UE supporton FWW for safe dynamic mobility.    Coordination   Coordination (R UE appears to have some coorindation deficits. )   General Therapy Interventions   Planned Therapy Interventions gait training;strengthening;transfer training;home program guidelines;progressive activity/exercise   Clinical Impression   Criteria for Skilled Therapeutic Intervention yes, treatment indicated   PT Diagnosis Generalized weakness   Influenced by the following impairments Decreased LE strength, decreased activity tolerance   Functional limitations due to impairments Decreased independence with gait/ADL's   Clinical Presentation Stable/Uncomplicated   Clinical Presentation Rationale Patient profile currently appears stable.    Clinical Decision Making (Complexity) Low complexity   Therapy Frequency` daily   Predicted Duration of Therapy Intervention (days/wks) 3 days   Anticipated Discharge Disposition Home with Home Therapy;Home with Assist;Transitional Care Facility   Risk & Benefits of therapy have been explained Yes   Patient, Family & other staff in agreement with plan of care Yes   Clinical Impression Comments Patient appropriate for continued acute care PT to address strength deficits to maximize independence with functional mobility.   "  [facility] TM \"6 Clicks\"   2016, Trustees of [facility], under license to [employer].  All rights reserved.   6 Clicks Short Forms Basic Mobility Inpatient Short Form   [facility]  \"6 Clicks\" V.2 Basic Mobility Inpatient Short Form   1. Turning from your back to your side while in a flat bed without using bedrails? 4 - None   2. Moving from lying on your back to sitting on the side of a flat bed without using bedrails? 4 - None   3. Moving to and from a bed to a chair (including a wheelchair)? 3 - A Little   4. Standing up from a chair using your arms (e.g., wheelchair, or bedside chair)? 3 - A Little   5. To walk in hospital room? 3 - A Little   6. Climbing 3-5 steps with a railing? 3 - A Little   Basic Mobility Raw Score (Score out of 24.Lower scores equate to lower levels of function) 20   Total Evaluation Time   Total Evaluation Time (Minutes) 10     "

## 2017-01-04 NOTE — PLAN OF CARE
Problem: Discharge Planning  Goal: Discharge Planning (Adult, OB, Behavioral, Peds)  Patient's goal is to d/c to [facility] via w/c at 1530 on 1/5

## 2017-01-04 NOTE — PLAN OF CARE
Problem: Goal Outcome Summary  Goal: Goal Outcome Summary  Outcome: No Change  Temp 99.8, other VSS.  A/O, quiet.  Inc urine x 2.  Bladder scanned for 46 mls and 0600.  No stools overnight.  IVF infusing at 50.

## 2017-01-04 NOTE — PLAN OF CARE
Problem: Goal Outcome Summary  Goal: Goal Outcome Summary  PT: Orders received, evaluation completed and treatment initiated. Pt is a 83 year old male admitted with weakness and found to have a UTI. PMH significant for HTN. Pt also reports a previous stroke about 2-3 years prior affecting the R UE/LE. Patient reports modified independence with mobility (using a FWW). He lives in a home with his wife with 2 stairs to enter (2 rails) and 14 stairs to manage in the home (one rail). Pt reports his wife assisting with ADL's (dressing, toileting and bathing) as he needs. On evaluation, patient perorms bed mobility with HOB elevated and SBA. Sit to/from stand with CGA. Requires assist to don/doff brief with toileting. Ambulates 300 feet x 2 reps with FWW and CGA progressing to SBA. Patient reports fatigue with mobility. At this time, anticipate patient would be safe to return home with home PT/OT. However pt reports his wife would have a hard time caring for him at this level (but not able to provide specifics). If wife is not able/willing to provide assist patient would need a TCU stay.

## 2017-01-04 NOTE — PLAN OF CARE
Problem: Goal Outcome Summary  Goal: Goal Outcome Summary  OT: Orders rec'd. PT recommending TCU at this time, with DC planned as early as tomorrow per MD notes. OT deferring eval to TCU.

## 2017-01-04 NOTE — PROGRESS NOTES
Paged PT to come and consult the patient per MD patient is ready to d/c.  Would like recommendations on safe d/c disposition.

## 2017-01-04 NOTE — PLAN OF CARE
Problem: Goal Outcome Summary  Goal: Goal Outcome Summary  Outcome: No Change  VSS. A/O. C.diff negative. Up with SBA to BR. Incontinent of urine at times. No BM this evening - could not collect stool sample for occult blood. Hgb 8.8, recheck AM. Bladder scanned for 0ml. Refused activity this evening despite encouragement. Shifts weight in bed with encouragement. Regular diet, tolerating well. Continuing antibiotics. Continue to monitor.

## 2017-01-04 NOTE — PROGRESS NOTES
Care Transition Initial Assessment - SW     Met with: PATIENT,SPOUSE    Active Problems:    UTI (urinary tract infection)         DATA  Lives With: spouse  Living Arrangements: house  Identified issues/concerns regarding health management: Patient will need help with d/c plans   Patient feels that they have adequate support @ home? YES  Transportation Available: family or friend will provide (Sons provide transportation)    ASSESSMENT  Cognitive Status: Alert and oriented  Concerns to be addressed: Patient is a 83 year old male who was admitted to the hospital for UTI. Prior to hospitalization patient was living at home with spouse where he was managing well. Patient is aware that he may have some d./c needs when pt gets ready to d/c from hospital. Sw called patient's spouse to discuss private pay TCU as OT has deferred patient to d/c to TCU and MD feels patient is ready to d/c today. SW explained that patient would need to private pay due to not meeting 3 day inpatient stay. SW explained most facilities require $3,000-$5,000 upon admission. Patient spouse feels she is being rushed out and will not give any choices until spouse meets with MD and patient. Spouse is on her way up to hospital. Kelin BLANKENSHIP, called PT to discuss their recommendation. PT stated they did not put their note in but are recommending home with Assist of spouse and Home PT. Sw will follow up once patient's spouse arrives at hospital.     PLAN  Patient Goals and Preferences: TCU vs. Home with HC PT  Patient anticipates discharging to:  TCU vs. Home with HC PT      ADDENDUM  I: SW met with patient, spouse and son to discuss d/c planning needs. Patient and family do not feel patient is safe to d/c home and will need TCU placement at d/c. SW explained that if patient d/c's today it will be private pay. MD feels patient is appropriate to d/c pending bed availability. Patient's spouse would like a referral sent to facilities in [facility] and  expressed interest in [facility], [facility]. SW will send referral via DOD. SW will update patient and family once bed is confirmed at one the preferred facilities.    ADDENDUM  I: [facility] from [facility] has no beds available.    Elle Barros, DIMITRIOS   *[phone]

## 2017-01-04 NOTE — PLAN OF CARE
Problem: Goal Outcome Summary  Goal: Goal Outcome Summary  A&O, Jena., Low grade fever, otherwise VSS.  Ambulated with A1 in roberson x 2.  Tolerating regular diet. IV maintnence fluids discontinued. Bladder scanned for 58 ml.  Incontint of urine x 3.  Unable to collect stool sample.

## 2017-01-04 NOTE — PROGRESS NOTES
[facility]    Hospitalist Progress Note    Date of Service (when I saw the patient): 01/04/2017    Assessment and Plan    Mr. 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        1.  Proteus UTI:  He had a recent cystoscopy with transurethral resection of bladder cancer on 12/10/2016.  Wilkins catheter was kept in place for one week, and removed, on 12/19/2016.  He was advised to come to ER in now with generalized worsening weakness, which was suspected due to UTI on admission  - UA on admission -grossly abnormal, culture grew proteus which is pansensitive.   - started on ceftriaxone 1 gram IV in ER continued.   - Afebrile (Tmax 99.8), WBC count normal. Taking PO. Vitals stable. Saline lock IV.    - He will need 2 weeks course of abx, cipro or ceftin, then follow up with PCP prior to that to see possibly suppression therapy given prior UTIs and his bladder cancer with procedure.    2.  Generalized weakness:  Suspected multifactorial- bladder cancer, anemia, ABILIO, UTI, but current UTI contributing to worsening weakness.   - Treat UTI as per #1.    - Creatinine improved to baseline  - PT and OT to assess the patient, consult for Social Work, as I anticipate that the patient may need a TCU at the time of discharge.       3.  History of hypertension:  His blood pressure for now seems to be well controlled.  We will continue with his prior to admission labetalol 300 mg p.o. twice daily.      4.  History of dyslipidemia:  Hold PTA daily Lipitor 20 mg p.o.   -  CK normal.    5.  History of coronary artery disease, status post multiple stents placed in the past:  This does not seem to be an acute issue at this time.  He is not on aspirin.  - continue with his prior to admission beta blocker.    6.  History of bladder cancer, status post multiple cystoscopies with transurethral resections of large bladder tumors, with last one on 12/10/2016 by Dr. Porter:  He is also status post chemotherapy and radiation therapy.   - Urology consulted given reported blood in urine which has improved, and Hb is stable.  - Urology recommending outpatient follow up, next cystoscopy scheduled in march.     7.  History of paroxysmal atrial fibrillation:  I think he was on Eliquis at some point in the past, now off anticoagulation because he developed hemoptysis in the past.      8.  Chronic kidney disease, stage III:  Most recent creatinine varied between 1.2 and 1.4.  Currently his creatinine is 1.37, around his baseline.  We will closely monitor his kidney function.  We will avoid nephrotoxic drugs.      9.  Ischemic systolic cardiomyopathy, with echocardiogram in 05/2015 showing ejection fraction of 45-50%:  He seems euvolemic at this time.  He is not on any diuretics at home.  We will mildly hydrate him with normal saline at 50 cc per hour, and we will closely monitor his fluid status since it seems that during his admission in 08/2016, he developed respiratory distress due to volume overload.      10. Anemia_ chronic likely due to ongoing illness and urological procedure, hematuria.  - initial slight drop is likely dilutional  - iron panel s/o AOCD  - on iron supplement, increase to BID, add Folic acid   - had dark stool, likely due to iron, guaiac  ordered - no need as Hb stable.    11. History of constipation.  Had issues with constipation needing visit to ER after urological procedure, was on scheduled Senna, and MiraLax p.r.n.  had loose stool yesterday so scheduled senna stopped. Wife very concerned given his history, will made these available as PRN.     DVT Prophylaxis: Pneumatic Compression Devices  Code Status: DNR/DNI    Disposition: Expected discharge: medically stable for discharge. Therapy recommending Home PT OT but family requesting TCU given weakness, and care needed at home.   - discussed with SW, working on TCU, discharge when bed available at TCU.  - Discussed at length with patient, his wife and son at bedside.     Papo Ochoa MD  hospitalist    Interval History  Patient was seen and examined, denies pain. C/o fatigue, ongoing for weeks.  - no dyspnea. Had blood in urine improved. Denies abd pain.  - no nausea or vomiting.  -Afebrile.    -Data reviewed today: I reviewed all new labs and imaging results over the last 24 hours. I personally reviewed no images or EKG's today.    Physical Exam  Temp: 98.7  F (37.1  C) Temp src: Oral BP: 112/49 mmHg   Heart Rate: 85 Resp: 16 SpO2: 96 % O2 Device: None (Room air)    Filed Vitals:    01/02/17 2247 01/03/17 0614 01/04/17 0455   Weight: 72 kg (158 lb 11.7 oz) 72 kg (158 lb 11.7 oz) 72.848 kg (160 lb 9.6 oz)     Vital Signs with Ranges  Temp:  [98.7  F (37.1  C)-99.8  F (37.7  C)] 98.7  F (37.1  C)  Heart Rate:  [72-85] 85  Resp:  [16] 16  BP: (112-159)/(49-70) 112/49 mmHg  SpO2:  [91 %-96 %] 96 %  I/O last 3 completed shifts:  In: 670 [P.O.:670]  Out: -     Constitutional: Alert, awake. Not in distress.   HEENT: PERRLA, EOMI, mucosa moist  Respiratory: Bilateral equal air entry, clear to auscultation. No respiratory distress.  Cardiovascular: Regular s1s2, no murmur, rub or gallop. No tachycardia.  GI: Soft, non distended, non tender, bowel tones active.  Skin/Integumen: No rash, no  blister.    Medications       ferrous sulfate  325 mg Oral BID     labetalol  300 mg Oral BID     cefTRIAXone  1 g Intravenous Q24H     sodium chloride (PF)  3 mL Intracatheter Q8H       Data    Recent Labs  Lab 01/04/17  0730 01/03/17  1545 01/03/17  0740 01/02/17  1911   WBC  --   --  9.9 9.9   HGB 9.0* 8.8* 8.8* 9.3*   MCV  --   --  95 96   PLT  --   --  258 279     --  142 139   POTASSIUM 3.7  --  3.8 3.8   CHLORIDE 109  --  107 104   CO2 25  --  25 24   BUN 17  --  24 32*   CR 1.18  --  1.31* 1.37*   ANIONGAP 8  --  10 11   TOR 8.5  --  8.3* 8.8   *  --  109* 120*   ALBUMIN  --   --   --  2.7*   PROTTOTAL  --   --   --  7.1   BILITOTAL  --   --   --  0.4   ALKPHOS  --   --   --  78   ALT  --   --   --  39   AST  --   --   --  27       No results found for this or any previous visit (from the past 24 hour(s)).

## 2017-01-05 NOTE — PROGRESS NOTES
X coverage: called by the nurse because pt lost iv access ad refuses to have another iv placed; he is here for UTI with E coli; as per the notes- plan to d/c on oral Cipro; plan to d/c tomorrow/; will switch now from iv Ceftriaxone to po Cipro.    Adelita Garcia MD

## 2017-01-05 NOTE — PROGRESS NOTES
DORIAN  I: SW was updated by [facility] that patient has a bed at their facility. SW updated patient's spouse and she is okay with this plan. Patient's wife would prefer SW arrange transport for patient through [facility] and is grant with the fees associated with transport. SW called [facility] and arranged w/c ride for 1530. CC text paged MD. SW will fax orders/PAS/Scripts when complete. SW updated patient's spouse and facility on transport time.    P: SW will continue to follow and assist as needed.      PAS-RR    D: Per DHS regulation, SW completed and submitted PAS-RR to [place] [employer] Direct Connect via the Senior LinkAge Line.  PAS-RR confirmation # is : [id number]    I: SW spoke with patient and they are aware a PAS-RR has been submitted.  SW reviewed with patient that they may be contacted for a follow up appointment within 10 days of hospital discharge if their SNF stay is < 30 days.  Contact information for Senior LinkAge Line was also provided.    A: patient verbalized understanding.    P: Further questions may be directed to [facility] LinkAge Line at #[phone], option #4 for PAS-RR staff.    Elle Barros, DIMITRIOS   *[phone]

## 2017-01-05 NOTE — DISCHARGE SUMMARY
[facility]    Discharge Summary  Hospitalist    Date of Admission:  1/2/2017  Date of Discharge:  1/5/2017  Discharging Provider: Julian Chambers  Date of Service (when I saw the patient): 01/05/2017    Discharge Diagnoses  UTI, likely due to pritchard catheter  Generalized weakness  HTN  Dyslipidemia  CAD  Paroxysmal A-fib  Chronic systolic CHF  CKD stage III  Bladder cancer  Chronic anemia  Constipation     History of Present Illness  Brian Hernandez is an 83 year old male who presented with generalized weakness, found to have UTI.    Hospital Course  Brian Hernandez was admitted on 1/2/2017.  The following problems were addressed during his hospitalization:    Proteus UTI, likely due to Pritchard catheter:  He had a recent cystoscopy with transurethral resection of bladder cancer on 12/10/2016.  Pritchard catheter was kept in place for one week, and removed, on 12/19/2016.  UA on admission grossly abnormal, culture grew proteus resistant to Macrobid only.  Treated with ceftriaxone, will transition to ciprofloxacin at discharge to complete a 2-week course of antibiotic.  Follow up with PCP to consider suppression therapy given prior UTIs and his bladder cancer with procedure.    Generalized weakness:  Suspected multifactorial- bladder cancer, anemia, ABILIO, UTI, but current UTI contributing to worsening weakness.  PT/OT recommend TCU.     Hypertension:  Continue PTA labetalol 300 mg p.o. twice daily.      Dyslipidemia:  Admission CK wnl.  Continue PTA daily Lipitor 20 mg p.o.     Coronary artery disease, status post multiple stents placed in the past:  This does not seem to be an acute issue at this time.  He is not on aspirin.  Continue with his prior to admission beta blocker.    Chronic systolic CHF:  Echocardiogram in 05/2015 showing ejection fraction of 45-50%.  No signs of exacerbation.     Paroxysmal atrial fibrillation:  Now off anticoagulation because he developed hemoptysis in the past.      Chronic  kidney disease, stage III:  Most recent creatinine varied between 1.2 and 1.4.  Currently stable.      Bladder cancer:  Status post multiple cystoscopies with transurethral resections of large bladder tumors, with last one on 12/10/2016 by Dr. Porter:  He is also status post chemotherapy and radiation therapy.   Urology consulted, recommend follow up as scheduled in March for cystoscopy monitoring.  Note patient reports he is tired of this - unclear if he truly wishes to stop monitoring and pursue a more comfort based approach.  Encouraged him to discuss with his wife and PCP.      Chronic anemia:  Iron studies consistent with anemia of chronic disease.  Likely due to ongoing illness and urological procedure, hematuria.  Iron supplement increased to bid and placed on folic acid this admission.    Constipation.  Continue PTA regimen.     Julian Chambers    Significant Results and Procedures  See below    Pending Results  These results will be followed up by: Hospitalist service  Unresulted Labs Ordered in the Past 30 Days of this Admission     Date and Time Order Name Status Description    1/3/2017 0740 Vitamin B12 In process           Code Status  DNR / DNI       Primary Care Physician  Flip Prakash    Constitutional: Well developed, well nourished, elderly male in no acute distress  Respiratory: Clear to auscultation bilaterally, no crackles or wheezes  Cardiovascular: Regular rate and rhythm, S1/S2 without murmur, rubs or gallops  GI: Abdomen soft, non-tender, non-distended, normal bowel sounds  Lymph/Hematologic: No edema  Musculoskeletal: Extremities warm and well perfused  Neurologic: Cranial nerves grossly intact, gross motor movements intact, alert and appropriate  Psychiatric: normal affect    Discharge Disposition  Discharged to nursing home  Condition at discharge: Stable    Consultations This Hospital Stay  SOCIAL WORK IP CONSULT  PHYSICAL THERAPY ADULT IP CONSULT  OCCUPATIONAL THERAPY ADULT IP  CONSULT  UROLOGY IP CONSULT  PHYSICAL THERAPY ADULT IP CONSULT  OCCUPATIONAL THERAPY ADULT IP CONSULT    Time Spent on This Encounter  I, Julian Chambers, personally saw the patient today and spent greater than 30 minutes discharging this patient.    Discharge Orders    General info for SNF   Length of Stay Estimate: Short Term Care: Estimated # of Days <30  Condition at Discharge: Stable  Level of care:skilled   Rehabilitation Potential: Fair  Admission H&P remains valid and up-to-date: Yes  Recent Chemotherapy: N/A  Use Nursing Home Standing Orders: Yes     Mantoux instructions   Give two-step Mantoux (PPD) Per Facility Policy Yes     Reason for your hospital stay   UTI, weakness     Activity - Up with nursing assistance     Follow Up and recommended labs and tests   Follow up with assisted physician.  The following labs/tests are recommended: H&H in 4-5 days.     DNR/DNI     Physical Therapy Adult Consult   Evaluate and treat as clinically indicated.    Reason:  UTI, weakness     Occupational Therapy Adult Consult   Evaluate and treat as clinically indicated.    Reason:  Weakness, deconditioning     Fall precautions     Advance Diet as Tolerated   Follow this diet upon discharge: Advance to a regular diet as tolerated       Discharge Medications  Current Discharge Medication List      START taking these medications    Details   folic acid (FOLVITE) 1 MG tablet Take 1 tablet (1 mg) by mouth daily  Qty: 30 tablet    Associated Diagnoses: Iron deficiency anemia due to chronic blood loss      cefUROXime (CEFTIN) 500 MG tablet Take 1 tablet (500 mg) by mouth 2 times daily for 11 days  Qty: 22 tablet, Refills: 0    Associated Diagnoses: Urinary tract infection due to Proteus         CONTINUE these medications which have CHANGED    Details   sennosides (SENOKOT) 8.6 MG tablet Take 2 tablets by mouth 2 times daily as needed for constipation  Qty: 120 each    Associated Diagnoses: Constipation, unspecified constipation  type         CONTINUE these medications which have NOT CHANGED    Details   ferrous sulfate (IRON) 325 (65 FE) MG tablet Take 325 mg by mouth 2 times daily       polyethylene glycol (MIRALAX/GLYCOLAX) powder Take 17 g by mouth daily as needed       labetalol (NORMODYNE) 300 MG tablet Take 1 tablet (300 mg) by mouth 2 times daily  Qty: 180 tablet, Refills: 0    Associated Diagnoses: Essential hypertension; CAD (coronary artery disease)      Atorvastatin Calcium (LIPITOR PO) Take 20 mg by mouth every evening       Multiple Vitamins-Minerals (CENTRUM SILVER) per tablet Take 1 tablet by mouth daily  Qty: 30 tablet      VITAMIN D, CHOLECALCIFEROL, PO Take 2,000 Units by mouth daily            Allergies  No Known Allergies  Data  Most Recent 3 CBC's:  Recent Labs   Lab Test  01/04/17   0730  01/03/17   1545  01/03/17   0740  01/02/17 1911 12/16/16   1115   WBC   --    --   9.9  9.9  7.7   HGB  9.0*  8.8*  8.8*  9.3*  10.6*   MCV   --    --   95  96  98   PLT   --    --   258  279  270      Most Recent 3 BMP's:  Recent Labs   Lab Test  01/04/17 0730  01/03/17   0740  01/02/17 1911   NA  142  142  139   POTASSIUM  3.7  3.8  3.8   CHLORIDE  109  107  104   CO2  25  25  24   BUN  17  24  32*   CR  1.18  1.31*  1.37*   ANIONGAP  8  10  11   TOR  8.5  8.3*  8.8   GLC  117*  109*  120*     Most Recent 2 LFT's:  Recent Labs   Lab Test  01/02/17   1911  10/31/16   1040   AST  27  13   ALT  39  18   ALKPHOS  78  97   BILITOTAL  0.4  0.3     Most Recent 6 Bacteria Isolates From Any Culture (See EPIC Reports for Culture Details):  Recent Labs   Lab Test  01/02/17   2011  10/31/16   1300  11/14/15   1116   CULT  >100,000 colonies/mL Proteus mirabilis*  <10,000 colonies/mL urogenital andi  <10,000 colonies/mL urogenital andi Susceptibility testing not routinely done       Results for orders placed or performed during the hospital encounter of 11/01/16   CT Chest/Abdomen/Pelvis w Contrast    Narrative    CT CHEST/ABDOMEN/PELVIS  WITH CONTRAST 11/1/2016 5:20 PM    HISTORY: Follow-up bladder cancer.    TECHNIQUE: Scans were obtained from the lung apices through the pelvis  with IV contrast. Radiation dose for this scan was reduced using  automated exposure control, adjustment of the mA and/or kV according  to patient size, or iterative reconstruction technique.    CONTRAST GIVEN: 82mL Isovue-370.    COMPARISON: CT abdomen 12/11/2015.    FINDINGS:  Chest: No mediastinal or hilar adenopathy or mass. Coronary  calcifications with calcification of the thoracic aorta. Linear  atelectasis or fibrosis both lung bases. No evidence for pulmonary  metastases.    Abdomen and pelvis: Splenic granulomata. Liver, spleen, and pancreas  are otherwise normal. Calcification of the abdominal aorta with  arterial calcifications in both kidneys. Both kidneys are otherwise  normal. Irregularity to the wall of the bladder with irregular  enhancement involving the wall of the bladder. This is worrisome for  recurrent bladder neoplasm. Bilateral total hip arthroplasties which  obscure detail in the pelvis. Scattered diverticula in the sigmoid  colon without evidence of diverticulitis. Colon and small bowel are  otherwise normal. Appendix is normal. No abdominal or pelvic  adenopathy. No worrisome skeletal abnormality identified. Remainder of  the scan is negative.      Impression    IMPRESSION:   1. Irregularity to the wall of the bladder with irregular enhancement  of the wall of the bladder. Findings are worrisome for recurrent  bladder neoplasm.  2. No definite evidence for metastatic disease.  3. Vascular calcifications.  4. Scattered diverticula in the sigmoid colon without evidence of  diverticulitis.    EVETTE JOE MD

## 2017-01-05 NOTE — PLAN OF CARE
Problem: Goal Outcome Summary  Goal: Goal Outcome Summary  Outcome: No Change  Pt is A&O, [place]., Low grade fever- tylenol given x1, otherwise VSS.  Pt refused to ambulate.  Tolerating regular diet. Loss of IV access and pt refused new one for antibiotics so pt was switched to oral. Bladder scanned for 41 ml.  Incontint of urine x 3.  Nursing will continue to monitor and possible dc tomorrow if placement is found.

## 2017-01-05 NOTE — PROVIDER NOTIFICATION
MD Notification    Person notified: on-call MD    Person Name: Dr. Garcia    Date/Time: 1/4/17 at 2035    Interaction: phone call    Purpose of Notification: pt lost IV access and refuses to have another IV, could we switch antibiotic to oral?    Orders Received:    Comments:

## 2017-01-05 NOTE — PLAN OF CARE
Problem: Goal Outcome Summary  Goal: Goal Outcome Summary  Outcome: Improving  A/O, very quiet, soft-spoken.  Inc urine at times.  Up with 1 to bathroom.  Plan is for d/c to TCU today.  Switched to oral antibiotics.

## 2017-01-05 NOTE — PROVIDER NOTIFICATION
MD Notification    Notified Person:  MD    Notified Persons Name: GITA Chambers    Notification Date/Time: 1/5/17 11:27 a.m.    Notification Interaction:  Talked with Physician    Purpose of Notification: Per SW patient has a TCU bed at [facility] via transport with wheelchair at 15:30 Asking for d/c orders    Orders Received:    Comments:

## 2017-01-05 NOTE — PLAN OF CARE
Problem: Goal Outcome Summary  Goal: Goal Outcome Summary  PT: Patient in bed upon arrival, agreeable to PT with some encouragement. Patient performs bed mobility with SBA, transfers to FWW with CGA, ambulates 100 feet x2 with FWW and SBA. Declines further activity stating he is eager for lunch and discharge this afternoon. Recommend patient discharge to TCU.       Physical Therapy Discharge Summary    Reason for therapy discharge:    Discharged to transitional care facility.    Progress towards therapy goal(s). See goals on [facility] Plan [facility] electronic health record for goal details.  Goals not met.  Barriers to achieving goals:   discharge from facility.    Therapy recommendation(s):    Continued therapy is recommended.  Rationale/Recommendations:  for improved functional activity tolerance, strength, safety and independence with functional mobility.

## 2017-01-06 PROBLEM — N39.0 URINARY TRACT INFECTION: Status: ACTIVE | Noted: 2017-01-01

## 2017-01-06 PROBLEM — D63.8 ANEMIA IN OTHER CHRONIC DISEASES CLASSIFIED ELSEWHERE: Status: ACTIVE | Noted: 2017-01-01

## 2017-01-06 NOTE — PROGRESS NOTES
[facility]  PRIMARY CARE PROVIDER AND CLINIC:  Flip Prakash [facility] [id number] / [facility]  Chief Complaint   Patient presents with     Hospital F/U       HPI:    Brian Hernandez is a 83 year old  (2/6/1933),PMH bladder cancer, HTN, atrial fib, CKD, anemia admitted to the [facility] from [facility].  Hospital stay 1/2/17 through 1/5/17  UTI patient has hx of bladder cancer s/p cystoscopy with transurethral resection of bladder tumor on 12/10 francheska Cooper removed 12/19 patient presented to ER with generalized weakness found to have proteus resistant organism started on recephin IV transitioned to oral ceftin.   PAF: no anticoagulation due to Hx of hemoptysis  Anemia: workup during hospitalization suggest anemia chronic disease, iron supplement increased and started on folic acid.   .  Admitted to this facility for  rehab, medical management and nursing care. Current issues are: On exam today patient is alert, sitting up in WC, denies pain or discomfort, denies fever, chills, cough, congestion, SOB, N/V/D states he is passing urine fine, does not know when last BM was, denies abdominal discomfot, states he slept well last night and appetite is good.     Last 3 BPs: 152/74, 150/75, 140/69.  Admission Weight: 162.1lbs    CODE STATUS/ADVANCE DIRECTIVES DISCUSSION:   DNR / DNI  Patient's living condition: lives with spouse    ALLERGIES:Review of patient's allergies indicates no known allergies.  PAST MEDICAL HISTORY:  has a past medical history of Coronary artery disease; Hypertension; Hyperlipidaemia; History of MI (myocardial infarction); GERD (gastroesophageal reflux disease); Hyperglycemia; Presbycusis; Osteoarthritis; Atrial fibrillation with RVR (H) (2014); CVA (cerebral infarction); Stented coronary artery; Bladder cancer (H); Syncope and collapse; Peripheral vascular disease (H); Rhinorrhea; Chronic constipation;  Difficult intubation; Intermittent asthma; Rectal carcinoma (H); Pulmonary hypertension (H); Snores; Pulmonary nodules; Anemia; and Fall (8-17-16). He also has no past medical history of PONV (postoperative nausea and vomiting) or Malignant hyperthermia.  PAST SURGICAL HISTORY:  has past surgical history that includes vascular surgery (2003); vascular surgery (2008); hip surgery; Endarterectomy carotid (3/15/2014); Heart Cath Coronary angiogram w/lv gram (4-13-10); Heart Cath, Angioplasty (4-4-08); Heart Cath Coronary angiogram w/lv gram (9-16-04); Heart Cath, Angioplasty (10-13-03); Open reduction internal fixation mandible (3/15/2014); colonoscopy; Phacoemulsification clear cornea with standard intraocular lens implant (Left, 6/30/2015); Phacoemulsification clear cornea with standard intraocular lens implant (Right, 7/21/2015); Cystoscopy, transurethral resection (TUR) tumor bladder, combined (N/A, 1/4/2016); Cystoscopy, retrogrades, combined (Bilateral, 1/4/2016); orthopedic surgery; Cystoscopy, transurethral resection (TUR) tumor bladder, combined (N/A, 4/8/2016); LEFT HEART CATHETERIZATION (2008); LEFT HEART CATHETERIZATION (2010 ); Open reduction internal fixation femur proximal (Right, 8/19/2016); Arthroplasty revision hip (Right, 8/19/2016); and Cystoscopy, transurethral resection (TUR) tumor bladder, combined (N/A, 12/9/2016).  FAMILY HISTORY: family history includes Family History Negative in his father and mother. There is no history of DIABETES.  SOCIAL HISTORY:  reports that he quit smoking about 42 years ago. His smoking use included Cigarettes, Pipe, and Cigars. He has a 25 pack-year smoking history. He has never used smokeless tobacco. He reports that he drinks alcohol. He reports that he does not use illicit drugs.    Post Discharge Medication Reconciliation Status: discharge medications reconciled, continue medications without change.  Current Outpatient Prescriptions   Medication Sig Dispense  "Refill     folic acid (FOLVITE) 1 MG tablet Take 1 tablet (1 mg) by mouth daily 30 tablet      sennosides (SENOKOT) 8.6 MG tablet Take 2 tablets by mouth 2 times daily as needed for constipation 120 each      cefUROXime (CEFTIN) 500 MG tablet Take 1 tablet (500 mg) by mouth 2 times daily for 11 days 22 tablet 0     ferrous sulfate (IRON) 325 (65 FE) MG tablet Take 325 mg by mouth 2 times daily        polyethylene glycol (MIRALAX/GLYCOLAX) powder Take 17 g by mouth daily as needed        labetalol (NORMODYNE) 300 MG tablet Take 1 tablet (300 mg) by mouth 2 times daily 180 tablet 0     Atorvastatin Calcium (LIPITOR PO) Take 20 mg by mouth every evening        Multiple Vitamins-Minerals (CENTRUM SILVER) per tablet Take 1 tablet by mouth daily 30 tablet      VITAMIN D, CHOLECALCIFEROL, PO Take 2,000 Units by mouth daily          ROS:  10 point ROS of systems including Constitutional, Eyes, Respiratory, Cardiovascular, Gastroenterology, Genitourinary, Integumentary, Muscularskeletal, Psychiatric were all negative except for pertinent positives noted in my HPI.    Exam:  /72 mmHg  Pulse 69  Temp(Src) 97.9  F (36.6  C)  Resp 16  Ht 5' 5\" (1.651 m)  Wt 162 lb 1.6 oz (73.528 kg)  BMI 26.97 kg/m2  SpO2 96%  GENERAL APPEARANCE:  Alert, in no distress  ENT:  Mouth and posterior oropharynx normal, moist mucous membranes, [place]  EYES:  EOM, conjunctivae, lids, pupils and irises normal, PERRL  RESP:  respiratory effort and palpation of chest normal, lungs clear to auscultation , no respiratory distress  CV:  Palpation and auscultation of heart done , regular rate and rhythm, no murmur, rub, or gallop, no edema  ABDOMEN:  normal bowel sounds, soft, nontender, no hepatosplenomegaly or other masses  M/S:   Examination of:   right upper extremity, left upper extremity, right lower extremity and left lower extremity  Inspection, ROM, stability and muscle strength normal  SKIN:  Inspection of skin and subcutaneous tissue " baseline  NEURO:   Cranial nerves 2-12 are normal tested and grossly at patient's baseline, speech WNL    Lab/Diagnostic data:  CBC RESULTS:   Recent Labs   Lab Test  01/04/17   0730  01/03/17   1545  01/03/17   0740  01/02/17 1911   WBC   --    --   9.9  9.9   RBC   --    --   2.82*  2.95*   HGB  9.0*  8.8*  8.8*  9.3*   HCT  27.4*  26.9*  26.9*  28.4*   MCV   --    --   95  96   MCH   --    --   31.2  31.5   MCHC   --    --   32.7  32.7   RDW   --    --   14.5  14.5   PLT   --    --   258  279       Last Basic Metabolic Panel:  Recent Labs   Lab Test  01/04/17   0730  01/03/17   0740   NA  142  142   POTASSIUM  3.7  3.8   CHLORIDE  109  107   TOR  8.5  8.3*   CO2  25  25   BUN  17  24   CR  1.18  1.31*   GLC  117*  109*       Liver Function Studies -   Recent Labs   Lab Test  01/02/17   1911  10/31/16   1040   PROTTOTAL  7.1  7.4   ALBUMIN  2.7*  3.4   BILITOTAL  0.4  0.3   ALKPHOS  78  97   AST  27  13   ALT  39  18     ASSESSMENT/PLAN:  Urinary tract infection without hematuria, site unspecified  Acute: continue ceftin 500mg BID for 11 days, PT and OT for strengthening, f/u with Dr. Porter urology as directed    Malignant neoplasm of overlapping sites of bladder (H)  Ongoing: has appt with Dr. Morgan on 1/9/17    Generalized muscle weakness  Acute: PT and OT for strengthening    Anemia in other chronic diseases classified elsewhere  Chronic: monitor Hgb during TCU stay, continue ferrous sulfate 325mg BID, folic acid 1mg QD    Essential hypertension with goal blood pressure less than 140/90  Paroxysmal a-fib (H)   CKD  Chronic: creat baseline 1.2-1.4, BMP follow, vitals daily and prn, continue labetalol 300mg BID         Orders:  BMP and CBC on Monday      Information reviewed:  Medications, vital signs, orders, nursing notes, problem list, hospital information. Total time spent with patient visit was 45 min including patient visit and review of past records. Greater than 50% of total time spent with counseling  and coordinating care.    Electronically signed by:  Tonya Lynn Haase, APRN CNP

## 2017-01-13 PROBLEM — R50.9 FEVER, UNSPECIFIED: Status: ACTIVE | Noted: 2017-01-01

## 2017-01-13 NOTE — PROGRESS NOTES
[facility]    Chief Complaint   Patient presents with     RECHECK       HPI:    Brian Hernandez is a 83 year old  (2/6/1933), who is being seen today for an episodic care visit at [facility]. Today's concern is:  .   Fever: ongoing: patient has had a low grade fever since admission to TCU 99.2-100.1 recently in the past 2 days temp increased to 101.6 range during the night mostly, today on exam temp 98.6 but patient did have tylenol this AM, denies chills, abdominal pain, cough, congestion, SOB although nursing state SaO2 is low at 86% this AM. Difficult assessment patient offers very little, affect is flat.     HTN: Last 3 BPs: 114/57, 137/67, 104/77  CKD see labs  Anemia: see labs  Admission Weight: 162.1lbs   Current Weight: 160.4lbs     ALLERGIES: Review of patient's allergies indicates no known allergies.  Past Medical, Surgical, Family and Social History reviewed and updated in [employer].    Current Outpatient Prescriptions   Medication Sig Dispense Refill     ACETAMINOPHEN PO Take 650 mg by mouth every 4 hours as needed for pain       folic acid (FOLVITE) 1 MG tablet Take 1 tablet (1 mg) by mouth daily 30 tablet      sennosides (SENOKOT) 8.6 MG tablet Take 2 tablets by mouth 2 times daily as needed for constipation 120 each      cefUROXime (CEFTIN) 500 MG tablet Take 1 tablet (500 mg) by mouth 2 times daily for 11 days 22 tablet 0     ferrous sulfate (IRON) 325 (65 FE) MG tablet Take 325 mg by mouth 2 times daily        polyethylene glycol (MIRALAX/GLYCOLAX) powder Take 17 g by mouth daily as needed        labetalol (NORMODYNE) 300 MG tablet Take 1 tablet (300 mg) by mouth 2 times daily 180 tablet 0     Atorvastatin Calcium (LIPITOR PO) Take 20 mg by mouth every evening        Multiple Vitamins-Minerals (CENTRUM SILVER) per tablet Take 1 tablet by mouth daily 30 tablet      VITAMIN D, CHOLECALCIFEROL, PO Take 2,000 Units by mouth daily        Medications reviewed:  Medications reconciled to  facility chart and changes were made to reflect current medications as identified as above med list. Below are the changes that were made:   Medications stopped since last EPIC medication reconciliation:   There are no discontinued medications.    Medications started since last [facility] medication reconciliation:  Orders Placed This Encounter   Medications     ACETAMINOPHEN PO     Sig: Take 650 mg by mouth every 4 hours as needed for pain         REVIEW OF SYSTEMS:  10 point ROS of systems including Constitutional, Eyes, Respiratory, Cardiovascular, Gastroenterology, Genitourinary, Integumentary, Muscularskeletal, Psychiatric were all negative except for pertinent positives noted in my HPI.    Physical Exam:  /57 mmHg  Pulse 68  Temp(Src) 100.9  F (38.3  C)  Resp 16  Wt 160 lb 6.4 oz (72.757 kg)  SpO2 99%  GENERAL APPEARANCE:  Alert, in no distress  ENT:  Mouth and posterior oropharynx normal, moist mucous membranes, [place]  EYES:  EOM, conjunctivae, lids, pupils and irises normal, PERRL  RESP:  respiratory effort and palpation of chest normal, lungs clear to auscultation , no respiratory distress  CV:  Palpation and auscultation of heart done , regular rate and rhythm, no murmur, rub, or gallop, no edema  ABDOMEN:  normal bowel sounds, soft, nontender, no hepatosplenomegaly or other masses  M/S:   Examination of:   right upper extremity, left upper extremity, right lower extremity and left lower extremity  generalized weakness noted  SKIN:  Inspection of skin and subcutaneous tissue baseline  NEURO:   Cranial nerves 2-12 are normal tested and grossly at patient's baseline, speech WNL    Recent Labs:    CBC RESULTS:   CBC 1/13/17 WBC 5.9, HGB 8.7, Plt 273  Recent Labs   Lab Test  01/04/17   0730  01/03/17   1545  01/03/17   0740  01/02/17   1911   WBC   --    --   9.9  9.9   RBC   --    --   2.82*  2.95*   HGB  9.0*  8.8*  8.8*  9.3*   HCT  27.4*  26.9*  26.9*  28.4*   MCV   --    --   95  96   MCH   --    --    31.2  31.5   MCHC   --    --   32.7  32.7   RDW   --    --   14.5  14.5   PLT   --    --   258  279       Last Basic Metabolic Panel:  Recent Labs   Lab Test  01/04/17   0730  01/03/17   0740   NA  142  142   POTASSIUM  3.7  3.8   CHLORIDE  109  107   TOR  8.5  8.3*   CO2  25  25   BUN  17  24   CR  1.18  1.31*   GLC  117*  109*       Liver Function Studies -   Recent Labs   Lab Test  01/02/17   1911  10/31/16   1040   PROTTOTAL  7.1  7.4   ALBUMIN  2.7*  3.4   BILITOTAL  0.4  0.3   ALKPHOS  78  97   AST  27  13   ALT  39  18     ASSESSMENT/PLAN:    Fever: ongoing UA/UC pending, labs stable as above, ordered CXR stat, continue to monitor, likely start Levaquin if CXR or UC positive       Urinary tract infection without hematuria, site unspecified  Acute: continue ceftin 500mg BID for 11 days finished on 1/16/17,  PT and OT for strengthening, f/u with Dr. Porter urology as directed    Malignant neoplasm of overlapping sites of bladder (H)  Ongoing: has appt with Dr. Morgan on 1/9/17    Generalized muscle weakness  Acute: PT and OT for strengthening    Anemia in other chronic diseases classified elsewhere  Chronic: monitor Hgb during TCU stay, continue ferrous sulfate 325mg BID, folic acid 1mg QD    Essential hypertension with goal blood pressure less than 140/90  Paroxysmal a-fib (H)    CKD  Chronic: creat baseline 1.2-1.4, BMP follow, vitals daily and prn, continue labetalol 300mg BID    Orders:  CXR stat        Electronically signed by  Tonya Lynn Haase, APRN CNP

## 2017-01-14 NOTE — PROGRESS NOTES
[facility]  PRIMARY CARE PROVIDER AND CLINIC:  [facility] [address] / [facility]      Pt seen by Dr Dale on 1/12/17 for an hospital f/u visit    Pt known to me from TCU stay 10/16    HPI:    Brian Hernandez is a 83 year old  (2/6/1933), PMH bladder and rectal cancer,  HTN, atrial fib, CKD, chronic anemia who was hospitalized at [facility] from 1/21/7 thru 1/5/17 for the treatment of a UTI.    Pt has ahx of bladder cancer, is  s/p cystoscopy with transurethral resection of bladder tumor on 12/10 by Dr. Porter; Wilkins removed 12/19.    Patient presented to ER with generalized weakness found to have a Proteus mirabilis UTI. Was treated with Rocephin, d/mirian on ceftin for an 11 day course.    Medical issues addressed included anemia (likely chronic disease), PAF (not on warfarin chronically secondary to history of hemoptysis).     Admitted to this facility for  rehab, medical management and nursing care.  Pt has an intermittent low grade T since admission to the TCU  Vitals have been stable, Pt has not c/o cough, SOB, abd pain or dysuria  Repeat UA/UC and CBC ordered last night. Pt remains on Ceftin thru 1/16/17        CODE STATUS/ADVANCE DIRECTIVES DISCUSSION:   DNR / DNI  Patient's living condition: lives with spouse    ALLERGIES:Review of patient's allergies indicates no known allergies.  PAST MEDICAL HISTORY:  has a past medical history of Coronary artery disease; Hypertension; Hyperlipidaemia; History of MI (myocardial infarction); GERD (gastroesophageal reflux disease); Hyperglycemia; Presbycusis; Osteoarthritis; Atrial fibrillation with RVR (H) (2014); CVA (cerebral infarction); Stented coronary artery; Bladder cancer (H); Syncope and collapse; Peripheral vascular disease (H); Rhinorrhea; Chronic constipation; Difficult intubation; Intermittent asthma; Rectal carcinoma (H); Pulmonary hypertension (H); Snores; Pulmonary nodules; Anemia; and Fall  (8-17-16). He also has no past medical history of PONV (postoperative nausea and vomiting) or Malignant hyperthermia.  PAST SURGICAL HISTORY:  has past surgical history that includes vascular surgery (2003); vascular surgery (2008); hip surgery; Endarterectomy carotid (3/15/2014); Heart Cath Coronary angiogram w/lv gram (4-13-10); Heart Cath, Angioplasty (4-4-08); Heart Cath Coronary angiogram w/lv gram (9-16-04); Heart Cath, Angioplasty (10-13-03); Open reduction internal fixation mandible (3/15/2014); colonoscopy; Phacoemulsification clear cornea with standard intraocular lens implant (Left, 6/30/2015); Phacoemulsification clear cornea with standard intraocular lens implant (Right, 7/21/2015); Cystoscopy, transurethral resection (TUR) tumor bladder, combined (N/A, 1/4/2016); Cystoscopy, retrogrades, combined (Bilateral, 1/4/2016); orthopedic surgery; Cystoscopy, transurethral resection (TUR) tumor bladder, combined (N/A, 4/8/2016); LEFT HEART CATHETERIZATION (2008); LEFT HEART CATHETERIZATION (2010 ); Open reduction internal fixation femur proximal (Right, 8/19/2016); Arthroplasty revision hip (Right, 8/19/2016); and Cystoscopy, transurethral resection (TUR) tumor bladder, combined (N/A, 12/9/2016).  FAMILY HISTORY: family history includes Family History Negative in his father and mother. There is no history of DIABETES.  SOCIAL HISTORY:  reports that he quit smoking about 42 years ago. His smoking use included Cigarettes, Pipe, and Cigars. He has a 25 pack-year smoking history. He has never used smokeless tobacco. He reports that he drinks alcohol. He reports that he does not use illicit drugs.        Post Discharge Medication Reconciliation Status:   .  Current Outpatient Prescriptions   Medication Sig Dispense Refill     ACETAMINOPHEN PO Take 650 mg by mouth every 4 hours as needed for pain       folic acid (FOLVITE) 1 MG tablet Take 1 tablet (1 mg) by mouth daily 30 tablet      sennosides (SENOKOT) 8.6 MG  tablet Take 2 tablets by mouth 2 times daily as needed for constipation 120 each      cefUROXime (CEFTIN) 500 MG tablet Take 1 tablet (500 mg) by mouth 2 times daily for 11 days 22 tablet 0     ferrous sulfate (IRON) 325 (65 FE) MG tablet Take 325 mg by mouth 2 times daily        polyethylene glycol (MIRALAX/GLYCOLAX) powder Take 17 g by mouth daily as needed        labetalol (NORMODYNE) 300 MG tablet Take 1 tablet (300 mg) by mouth 2 times daily 180 tablet 0     Atorvastatin Calcium (LIPITOR PO) Take 20 mg by mouth every evening        Multiple Vitamins-Minerals (CENTRUM SILVER) per tablet Take 1 tablet by mouth daily 30 tablet      VITAMIN D, CHOLECALCIFEROL, PO Take 2,000 Units by mouth daily          ROS:  10 point ROS of systems including Constitutional, Eyes, Respiratory, Cardiovascular, Gastroenterology, Genitourinary, Integumentary, Muscularskeletal, Psychiatric were all negative except for pertinent positives noted in my HPI.    Exam:    GENERAL APPEARANCE:  Sleepy, well nourished appearing, lying in bed  ENT:  Mouth and posterior oropharynx normal, moist mucous membranes, [place]  EYES:  EOM, conjunctivae, lids, pupils and irises normal, PERRL  RESP:  Lungs clear, RR 12, unlabored  CV:  RRR no M  ABDOMEN:  Soft, non-distended, non-tender  M/S:   No LE edema, +generalized weakness  SKIN:  Inspection of skin and subcutaneous tissue baseline  NEURO:   Sleepy, facies symmetric, Pt speaks softly, hesitantly.    Lab/Diagnostic data:  CBC RESULTS:   Recent Labs   Lab Test  01/04/17   0730  01/03/17   1545  01/03/17   0740  01/02/17   1911   WBC   --    --   9.9  9.9   RBC   --    --   2.82*  2.95*   HGB  9.0*  8.8*  8.8*  9.3*   HCT  27.4*  26.9*  26.9*  28.4*   MCV   --    --   95  96   MCH   --    --   31.2  31.5   MCHC   --    --   32.7  32.7   RDW   --    --   14.5  14.5   PLT   --    --   258  279       Last Basic Metabolic Panel:  Recent Labs   Lab Test  01/04/17   0730  01/03/17   0740   NA  142  142    POTASSIUM  3.7  3.8   CHLORIDE  109  107   TOR  8.5  8.3*   CO2  25  25   BUN  17  24   CR  1.18  1.31*   GLC  117*  109*       Liver Function Studies -   Recent Labs   Lab Test  01/02/17   1911  10/31/16   1040   PROTTOTAL  7.1  7.4   ALBUMIN  2.7*  3.4   BILITOTAL  0.4  0.3   ALKPHOS  78  97   AST  27  13   ALT  39  18       ASSESSMENT/PLAN:    Urinary tract infection without hematuria, site unspecified, s/p TUR with bladder tumor removal and short term Wilkins placement  Pt still with low grade T, source unclear, but possibly bladder  Plan continue ceftin, await repeat UC, monitor mental status, vitals, GI and pulmonary status.  Urology f/u for bladder cancer. PT/OT    Anemia in other chronic diseases classified elsewhere  Chronic: monitor Hgb,  continue ferrous sulfate 325mg BID, folic acid 1mg QD    Essential hypertension with goal blood pressure less than 140/90  Paroxysmal a-fib (H)   CKD  Stable  Plan continue Labetalol, monitor vitals, BMP    Joaquim Dale MD

## 2017-01-17 NOTE — PROGRESS NOTES
[facility]    Chief Complaint   Patient presents with     RECHECK       HPI:    Brian Hernandez is a 83 year old  (2/6/1933), who is being seen today for an episodic care visit at [facility]. Today's concern is:  FeverUTI : ongoing low grade fever  range, UC negative no growth, CXR negative for acute findings, patient is progressing in therapy, eating, no c/o pain or discomfort,    Deconditioning: Patient is walking > 150 feet with RW indep in room, SBA in hallways, indep with ADL's  HTN: Last 3 BPs: 128/53, 105/56, 128/53, denies CP, SOB, palpitations  CKD see labs  Anemia: see labs  Admission Weight: 162.1lbs   Current Weight: 161.1lbs, 160.4lbs, 158.7lbs, 162.1lbs, 168.4lbs, 169.9lbs     ALLERGIES: Review of patient's allergies indicates no known allergies.  Past Medical, Surgical, Family and Social History reviewed and updated in [facility].    Current Outpatient Prescriptions   Medication Sig Dispense Refill     ACETAMINOPHEN PO Take 650 mg by mouth every 4 hours as needed for pain       folic acid (FOLVITE) 1 MG tablet Take 1 tablet (1 mg) by mouth daily 30 tablet      sennosides (SENOKOT) 8.6 MG tablet Take 2 tablets by mouth 2 times daily as needed for constipation 120 each      ferrous sulfate (IRON) 325 (65 FE) MG tablet Take 325 mg by mouth 2 times daily        polyethylene glycol (MIRALAX/GLYCOLAX) powder Take 17 g by mouth daily as needed        labetalol (NORMODYNE) 300 MG tablet Take 1 tablet (300 mg) by mouth 2 times daily 180 tablet 0     Atorvastatin Calcium (LIPITOR PO) Take 20 mg by mouth every evening        Multiple Vitamins-Minerals (CENTRUM SILVER) per tablet Take 1 tablet by mouth daily 30 tablet      VITAMIN D, CHOLECALCIFEROL, PO Take 2,000 Units by mouth daily        Medications reviewed:  Medications reconciled to facility chart and changes were made to reflect current medications as identified as above med list. Below are the changes that were made:   Medications  "stopped since last EPIC medication reconciliation:   There are no discontinued medications.    Medications started since last [facility] medication reconciliation:  No orders of the defined types were placed in this encounter.         REVIEW OF SYSTEMS:  10 point ROS of systems including Constitutional, Eyes, Respiratory, Cardiovascular, Gastroenterology, Genitourinary, Integumentary, Muscularskeletal, Psychiatric were all negative except for pertinent positives noted in my HPI.    Physical Exam:  BP 86/51 mmHg  Pulse 72  Temp(Src) 99.3  F (37.4  C)  Resp 24  Ht 5' 5\" (1.651 m)  Wt 161 lb 1.6 oz (73.074 kg)  BMI 26.81 kg/m2  SpO2 98%  GENERAL APPEARANCE:  Alert, in no distress  ENT:  Mouth and posterior oropharynx normal, moist mucous membranes, [place]  EYES:  EOM, conjunctivae, lids, pupils and irises normal, PERRL  RESP:  respiratory effort and palpation of chest normal, lungs clear to auscultation , no respiratory distress  CV:  Palpation and auscultation of heart done , regular rate and rhythm, no murmur, rub, or gallop, no edema  ABDOMEN:  normal bowel sounds, soft, nontender, no hepatosplenomegaly or other masses  M/S:   Examination of:   right upper extremity, left upper extremity, right lower extremity and left lower extremity  generalized weakness noted  SKIN:  Inspection of skin and subcutaneous tissue baseline  NEURO:   Cranial nerves 2-12 are normal tested and grossly at patient's baseline, speech WNL    Recent Labs:    CBC RESULTS:   1/13/17 WBC 5.9, Hgb 8.7, Plt 273  1/9/17 WBC 7.8, Hgb 9.0,Plt 283  Recent Labs   Lab Test  01/04/17   0730  01/03/17   1545  01/03/17   0740  01/02/17   1911   WBC   --    --   9.9  9.9   RBC   --    --   2.82*  2.95*   HGB  9.0*  8.8*  8.8*  9.3*   HCT  27.4*  26.9*  26.9*  28.4*   MCV   --    --   95  96   MCH   --    --   31.2  31.5   MCHC   --    --   32.7  32.7   RDW   --    --   14.5  14.5   PLT   --    --   258  279       Last Basic Metabolic Panel:  BMP 1/9/17 Na " 140, K+ 3.9, BUN 23, creat 1.35  UC on 1/12/17 no growth after 1 day  Recent Labs   Lab Test  01/04/17   0730  01/03/17   0740   NA  142  142   POTASSIUM  3.7  3.8   CHLORIDE  109  107   TOR  8.5  8.3*   CO2  25  25   BUN  17  24   CR  1.18  1.31*   GLC  117*  109*       Liver Function Studies -   Recent Labs   Lab Test  01/02/17   1911  10/31/16   1040   PROTTOTAL  7.1  7.4   ALBUMIN  2.7*  3.4   BILITOTAL  0.4  0.3   ALKPHOS  78  97   AST  27  13   ALT  39  18     ASSESSMENT/PLAN:    Fever: ongoing UA/UC pending, labs stable as above, ordered CXR stat, continue to monitor, likely start Levaquin if CXR or UC positive       Urinary tract infection without hematuria, site unspecified  Acute: finished ceftin yesterday,  PT and OT for strengthening, f/u with Dr. Porter urology as directed  Continue to monitor    Malignant neoplasm of overlapping sites of bladder (H)  Ongoing: appt with Dr. Morgan on 1/9/17 no need for visit, will f/u as OP    Generalized muscle weakness  Acute: PT and OT for strengthening    Anemia in other chronic diseases classified elsewhere  Chronic: monitor Hgb during TCU stay, continue ferrous sulfate 325mg BID, folic acid 1mg QD    Essential hypertension with goal blood pressure less than 140/90  Paroxysmal a-fib (H)    CKD  Chronic: creat baseline 1.2-1.4, BMP follow, vitals daily and prn, continue labetalol 300mg BID    Orders:  CBC and BMP in am    Electronically signed by  Tonya Lynn Haase, APRN CNP

## 2017-01-18 NOTE — PROGRESS NOTES
[facility] DISCHARGE SUMMARY    PATIENT'S NAME: Brian Hernandez  YOB: 1933  MEDICAL RECORD NUMBER:  [id number]    PRIMARY CARE PROVIDER AND CLINIC RESPONSIBLE AFTER TRANSFER: Flip Prakash [facility] [id number] / [facility]    CODE STATUS/ADVANCE DIRECTIVES DISCUSSION:   DNR / DNI     No Known Allergies    TRANSFERRING PROVIDERS: Tonya Lynn Haase, APRN CNP, Dr. Suyapa MD  DATE OF SNF ADMISSION:  January / 05 / 2017  DATE OF SNF (anticipated) DISCHARGE: January / 20 / 2017  DISCHARGE DISPOSITION: [facility] Provider   Nursing Facility: [facility] stay 1/2/17 to 1/5/17.     Condition on Discharge:  Stable.  Function:  Walking indep using a RW > 150 feet, indep with ADL's  Cognitive Scores: BIMS 14/15    Equipment: walker    DISCHARGE DIAGNOSIS:   1. Urinary tract infection, site unspecified    2. Elevated temperature    3. Malignant neoplasm of urinary bladder, unspecified site (H)    4. Generalized muscle weakness    5. Anemia in other chronic diseases classified elsewhere    6. Essential hypertension with goal blood pressure less than 140/90    7. CKD (chronic kidney disease) stage 3, GFR 30-59 ml/min    8. Paroxysmal a-fib (H)        PAST MEDICAL HISTORY:  has a past medical history of Coronary artery disease; Hypertension; Hyperlipidaemia; History of MI (myocardial infarction); GERD (gastroesophageal reflux disease); Hyperglycemia; Presbycusis; Osteoarthritis; Atrial fibrillation with RVR (H) (2014); CVA (cerebral infarction); Stented coronary artery; Bladder cancer (H); Syncope and collapse; Peripheral vascular disease (H); Rhinorrhea; Chronic constipation; Difficult intubation; Intermittent asthma; Rectal carcinoma (H); Pulmonary hypertension (H); Snores; Pulmonary nodules; Anemia; and Fall (8-17-16). He also has no past medical history of PONV (postoperative nausea and vomiting) or Malignant  "hyperthermia.    DISCHARGE MEDICATIONS:  Current Outpatient Prescriptions   Medication Sig Dispense Refill     ACETAMINOPHEN PO Take 650 mg by mouth every 4 hours as needed for pain       folic acid (FOLVITE) 1 MG tablet Take 1 tablet (1 mg) by mouth daily 30 tablet      sennosides (SENOKOT) 8.6 MG tablet Take 2 tablets by mouth 2 times daily as needed for constipation 120 each      ferrous sulfate (IRON) 325 (65 FE) MG tablet Take 325 mg by mouth 2 times daily        polyethylene glycol (MIRALAX/GLYCOLAX) powder Take 17 g by mouth daily as needed        labetalol (NORMODYNE) 300 MG tablet Take 1 tablet (300 mg) by mouth 2 times daily 180 tablet 0     Atorvastatin Calcium (LIPITOR PO) Take 20 mg by mouth every evening        Multiple Vitamins-Minerals (CENTRUM SILVER) per tablet Take 1 tablet by mouth daily 30 tablet      VITAMIN D, CHOLECALCIFEROL, PO Take 2,000 Units by mouth daily          MEDICATION CHANGES/RATIONALE:   There were no medication changes made during TCU stay,   Patient did have an ongoing low grade fever, workup CBC, UA/UC, BMP were all negative for source of infection. Patient is feeling well, progressing in therapy, appetite is good.   Last 3 BPs: 86/51, 128/53, 105/56.  Admission Weight: 169.9lbs  Current Weight: 161.1lbs    Controlled medications sent with patient: not applicable/none     ROS:    10 point ROS of systems including Constitutional, Eyes, Respiratory, Cardiovascular, Gastroenterology, Genitourinary, Integumentary, Muscularskeletal, Psychiatric were all negative except for pertinent positives noted in my HPI.    Physical Exam:   Vitals: /62 mmHg  Pulse 73  Temp(Src) 98.7  F (37.1  C)  Resp 18  Ht 5' 5\" (1.651 m)  Wt 161 lb 1.6 oz (73.074 kg)  BMI 26.81 kg/m2  SpO2 97%  BMI= Body mass index is 26.81 kg/(m^2).    GENERAL APPEARANCE:  Alert, in no distress  ENT:  Mouth and posterior oropharynx normal, moist mucous membranes, [place]  EYES:  EOM, conjunctivae, lids, pupils and " irises normal, PERRL  RESP:  respiratory effort and palpation of chest normal, lungs clear to auscultation , no respiratory distress  CV:  Palpation and auscultation of heart done , regular rate and rhythm, no murmur, rub, or gallop, no edema  ABDOMEN:  normal bowel sounds, soft, nontender, no hepatosplenomegaly or other masses  M/S:   Examination of:   right upper extremity, left upper extremity, right lower extremity and left lower extremity  Inspection, ROM, stability and muscle strength normal  SKIN:  Inspection of skin and subcutaneous tissue baseline  NEURO:   Cranial nerves 2-12 are normal tested and grossly at patient's baseline, speech WNL     HPI Nursing Facility Course:  Patient progressed in therapy to indep ambulation walking > 150 feet with RW, indep with ADL's will DC home to live with wife, cognitive assessment SBT is 2/28, patient will have home RN, HHA, PT and OT.     .  ASSESSMENT/PLAN:  Urinary tract infection without hematuria, site unspecified  Elevated temperature  Acute: finished with ABx, continues to have a low grade fever, temp .1 workup negative for active infection,  DC home with home RN, HHA, PT and OT  f/u with Dr. Porter urology as directed    Malignant neoplasm of overlapping sites of bladder (H)  Ongoing: f/u with Dr. Morgan as OP    Generalized muscle weakness  Acute: DC home with home PT and OT for strengthening    Anemia in other chronic diseases classified elsewhere  Chronic: continue ferrous sulfate 325mg BID, folic acid 1mg QD    Essential hypertension with goal blood pressure less than 140/90  Paroxysmal a-fib (H)    CKD  Chronic: creat baseline 1.2-1.4, BMP follow,  continue labetalol 300mg BID        DISCHARGE PLAN:  Occupational Therapy, Physical Therapy, Registered Nurse, Home Health Aide and From:  [facility]  Patient instructed to follow-up with:  PCP in 5 - 7 days       Pending labs: none    SNF labs   CBC RESULTS:  CBC 1/18/17 WBC 8.6, Hgb 9.1, Plt 299    Recent Labs   Lab Test  01/04/17   0730  01/03/17   1545  01/03/17   0740  01/02/17 1911   WBC   --    --   9.9  9.9   RBC   --    --   2.82*  2.95*   HGB  9.0*  8.8*  8.8*  9.3*   HCT  27.4*  26.9*  26.9*  28.4*   MCV   --    --   95  96   MCH   --    --   31.2  31.5   MCHC   --    --   32.7  32.7   RDW   --    --   14.5  14.5   PLT   --    --   258  279       Last Basic Metabolic Panel:  BMP on 1/18/17 Na 142, K+ 4.2, BUN 23, creat 1.41  Recent Labs   Lab Test  01/04/17   0730  01/03/17   0740   NA  142  142   POTASSIUM  3.7  3.8   CHLORIDE  109  107   TOR  8.5  8.3*   CO2  25  25   BUN  17  24   CR  1.18  1.31*   GLC  117*  109*       Liver Function Studies -   Recent Labs   Lab Test  01/02/17   1911  10/31/16   1040   PROTTOTAL  7.1  7.4   ALBUMIN  2.7*  3.4   BILITOTAL  0.4  0.3   ALKPHOS  78  97   AST  27  13   ALT  39  18     UA/UC 1/12/17 No growth after 1 day  CXR 1/13/17 no acute findings    Discharge Treatments: none    TOTAL DISCHARGE TIME:   Greater than 30 minutes  Electronically signed by:  Tonya Lynn Haase, APRN CNP

## 2017-01-19 PROBLEM — R50.9 ELEVATED TEMPERATURE: Status: ACTIVE | Noted: 2017-01-01

## 2017-01-20 NOTE — TELEPHONE ENCOUNTER
Reason for Call:  Form, our goal is to have forms completed with 72 hours, however, some forms may require a visit or additional information.    Type of letter, form or note:  [employer]    Who is the form from?: [employer] (if other please explain)    Where did the form come from: form was faxed in    What clinic location was the form placed at?: [facility]    Where the form was placed: Dr's Box: Flip Prakash MD    What number is listed as a contact on the form?: [employer], fax # [phone]       Additional comments: [employer] - Face to Face Requirements    Call taken on 1/20/2017 at 1:58 PM by JOSIAH LUIS

## 2017-01-23 NOTE — TELEPHONE ENCOUNTER
Form reviewed, completed, and signed by Dr. Flip Prakash and faxed to [facility].  [facility]  Edie Singh TC

## 2017-01-24 NOTE — TELEPHONE ENCOUNTER
Reason for Call:  Form, our goal is to have forms completed with 72 hours, however, some forms may require a visit or additional information.    Type of letter, form or note:  [facility]    Who is the form from?: [employer] (if other please explain)    Where did the form come from: form was faxed in    What clinic location was the form placed at?: [facility]    Where the form was placed: Dr's Box: Flip Prakash MD    What number is listed as a contact on the form?: [employer], fax # [phone]       Additional comments: Signature on OCCUPATIONAL THERAPY Care Plan    Call taken on 1/24/2017 at 9:03 AM by JOSIAH LUIS

## 2017-01-24 NOTE — TELEPHONE ENCOUNTER
Form reviewed and signed by Dr. Flip Prakash and faxed to [facility].  [facility]  Edie Singh TC

## 2017-01-24 NOTE — TELEPHONE ENCOUNTER
Reason for Call:  Form, our goal is to have forms completed with 72 hours, however, some forms may require a visit or additional information.    Type of letter, form or note:  [employer]    Who is the form from?: [employer] (if other please explain)    Where did the form come from: form was faxed in    What clinic location was the form placed at?: [facility]    Where the form was placed: Dr's Box: Flip Prakash MD    What number is listed as a contact on the form?: [employer], fax # [phone]       Additional comments: Signature on admission orders    Call taken on 1/24/2017 at 9:11 AM by JOSIAH LUIS

## 2017-01-25 NOTE — TELEPHONE ENCOUNTER
Call to [facility], spoke with Ann Marie, verified that Prescription for Celexa was received.  Call to Eladio (son), advised report was received from [facility] and routed to Dr. Flip Prakash.  Advised Dr. Flip Prakash had approved a Prescription for Celexa, that [facility] had [facility], and verified that [facility] did received the Prescription.  Edie Singh, TC

## 2017-01-25 NOTE — TELEPHONE ENCOUNTER
Prescription for Celexa 10 mg approved by Dr. Flip Prakash and faxed to [facility] at fax # [phone].  Edie Singh TC

## 2017-01-25 NOTE — TELEPHONE ENCOUNTER
Eladio pt's son called requesting Rx for Celexa. [facility] psychologist Shirley Gandara advised he start on Celexa. Rx will need to be ordered by PCP. Orders were to be sent by [facility] but no information was received at clinic. Son is requesting Rx before appt Monday. Notes he is showing depression symptoms not eating and sleeping. [facility] order.

## 2017-01-26 NOTE — TELEPHONE ENCOUNTER
Reason for Call:  Form, our goal is to have forms completed with 72 hours, however, some forms may require a visit or additional information.    Type of letter, form or note:  [facility]    Who is the form from?: [facility] (if other please explain)    Where did the form come from: form was faxed in    What clinic location was the form placed at?: [facility]    Where the form was placed: Dr's Box: Flip Prakash MD    What number is listed as a contact on the form?: [facility], fax # [phone]       Additional comments: Home Care DC Summary    Call taken on 1/26/2017 at 8:36 AM by JOSIAH LIUS

## 2017-01-30 PROBLEM — A41.9 SEPSIS (H): Status: ACTIVE | Noted: 2017-01-01

## 2017-01-30 NOTE — TELEPHONE ENCOUNTER
Form reviewed and signed by Dr. Flip Prakash and faxed to [facility].  [facility].  Edie Singh TC

## 2017-01-30 NOTE — ED NOTES
[facility]  ED Nurse Handoff Report    ED Chief complaint: Fever and Abdominal Pain      ED Diagnosis:   Final diagnoses:   Severe sepsis (H)   Fever, unspecified   Anemia, unspecified type   Bladder infection       Code Status: DNR / DNI    Allergies: No Known Allergies    Activity level:  Total Care     Needed?: No    Isolation: No  Infection: Not Applicable    Bariatric?: No      Vital Signs:   Filed Vitals:    01/30/17 1457 01/30/17 1500   BP: 164/90    Pulse: 112    Temp: 102.9  F (39.4  C)    TempSrc: Oral    Resp: 18    SpO2: 91% 91%       Cardiac Rhythm: ,        Pain level:      Is this patient confused?: Yes    Patient Report: Initial Complaint: Fever   Focused Assessment: Patient came into the the ER for a fever. Recently discharged from the hospital following a UTI. Patient has a history of a stroke and is aphasic. Patient is able to follow instructions, but acts confused at times.   Tests Performed: Blood, CT, Xray  Abnormal Results: UTI, WBC.   Treatments provided: Fluids, antibiotics     Family Comments: Wife and other family members currently at bedside.     OBS brochure/video discussed/provided to patient: N/A    ED Medications:   Medications   piperacillin-tazobactam (ZOSYN) 3.375 g vial to attach to  mL bag (3.375 g Intravenous New Bag 1/30/17 1642)   lactated ringers BOLUS 1,000 mL (1,000 mLs Intravenous New Bag 1/30/17 1651)   acetaminophen (TYLENOL) tablet 1,000 mg (not administered)   0.9% sodium chloride BOLUS (1,000 mLs Intravenous New Bag 1/30/17 1641)   acetaminophen (TYLENOL) tablet 650 mg (650 mg Oral Given 1/30/17 1651)       Drips infusing?:  Yes      ED NURSE PHONE NUMBER: *[phone]

## 2017-01-30 NOTE — ED PROVIDER NOTES
History     Chief Complaint:    Fever       HPI history limited secondary to the patient's expressive aphasia.   Brian Hernandez is a 83 year old male with a history of stroke (with  expressive aphasia) who presents with fever. The patient was recently in the emergency department (1/2/17) for a Proteus UTI. He was hospitalized for this until 1-5/17 and then and was in [facility] transitional care for 3 weeks, discharged on 1/20/17. His wife states that he has been declining with a cough, weakness, and anorexia. He developed a fever of around 102 for the last several days. His wife states that his cough is slightly better. The patient has not had any diarrhea.       Allergies:  No Known Drug Allergies      Medications:     Citalopram (celexa) 10 mg tablet  Acetaminophen po  Folic acid (folvite) 1 mg tablet  Sennosides (senokot) 8.6 mg tablet  Ferrous sulfate (iron) 325 (65 fe) mg tablet  Polyethylene glycol (miralax/glycolax) powder  Labetalol (normodyne) 300 mg tablet  Atorvastatin calcium (lipitor po)  Multiple vitamins-minerals (centrum silver) per tablet  Vitamin d, cholecalciferol, po      Past Medical History:     Stroke  Coronary artery disease  Hypertension  Hyperlipidaemia  History of MI (myocardial infarction)  GERD (gastroesophageal reflux disease)  Hyperglycemia  Presbycusis  Osteoarthritis  Atrial fibrillation with RVR (H)  CVA (cerebral infarction)  Stented coronary artery  Bladder cancer (H)  Syncope and collapse  Peripheral vascular disease (H)  Rhinorrhea  Chronic constipation  Difficult intubation  Intermittent asthma  Rectal carcinoma (H)  Pulmonary hypertension (H)  Snores  Pulmonary nodules  Anemia  Fall      Past Surgical History:     Vascular surgery, multiple stents  SUJIT bilateral  Endarterectomy carotid  Heart cath coronary angiogram x4   ORIF mandible  ORIF femur    Revision SUJIT 08/16  Arthroplasty    Cystoscopy, transurethral resection tumor bladder, combined - 12/09/16    Family History:     History reviewed. No pertinent family history.      Social History:  The patient was accompanied to the ED by wife and son  Smoking Status: Former smoker -- 1.00 packs/day 1.00 packs.day for 25 years - 01/01/1975  Smokeless Tobacco: Never used  Alcohol Use: Yes     Marital Status:            Review of Systems   Reason unable to perform ROS: Expressive aphasia from stroke.       Physical Exam   First Vitals:       BP Temp Temp src Pulse Resp SpO2   01/30/17 1457 164/90 mmHg 102.9  F (39.4  C) Oral 112 18 91 %       Physical Exam  General: Resting comfortably on the gurney    The patient has a definite expressive aphasia and ? mild receptive aphasia as well.     He talks very little, and appears to generally understand discussions  Head:  The scalp, face, and head appear normal  Eyes:  The pupils are equal, round, and reactive to light    There is no nystagmus    Extraocular muscles are intact    Conjunctivae and sclerae are normal  ENT:    The nose is normal    Pinnae are normal    The oropharynx is normal    Uvula is in the midline  Neck:  Normal range of motion    There is no rigidity noted    There is no midline cervical spine pain/tenderness    Trachea is in the midline    No mass is detected  CV:  Tachycardic rate (fever)    Normal S1/S2, no S3/S4    No pathological murmur detected  Resp:  Lungs are clear    There is no tachypnea    Non-labored    No rales    No wheezing   GI:  Abdomen is soft, there is no rigidity    No distension    No tympani    No rebound tenderness   :   Normal penis, testicles normal.  Incontinence garment in place.  MS:  Normal muscular tone    Symmetric motor strength    No major joint effusions    No asymmetric leg swelling, no calf tenderness  Skin:  Left pneumonectomy scar.     Skin is pale consistent with chronic anemia.    No rash or acute skin lesions noted  Neuro: Chronic aphasia is noted.  Good motor strength, except right arm (mild weakness)  Psych:  Awake. Alert.     Lymph: No anterior cervical lymphadenopathy noted      Emergency Department Course     Imaging:  X-ray Chest, 2 views:   Prior left thoracotomy. Nothing acute. No infiltrates  Report per radiology.   Radiographic findings were communicated with the patient who voiced understanding of the findings.    Laboratory:  (1505) Lactic acid: 2.6 (H)    Blood culture x2: pending    Urine Culture: pending     UA: Cloudy yellow urine, blood large, protein Albumin >=300, leukocyte esterase large, WBC > 100, RBC 10-25, Bacteria few.  otherwise WNL     Influenza A/B antigen: negative     CBC:  WBC 10.5, HGB 9.8 (L),   BMP: Glucose 192 (H), BUN 33 (H), GFR estimate 39 (L), otherwise WNL (Creatinine 1.68 (H))     Interventions:  (1651) Normal Saline, 1.5 liter, IV bolus    (1651) Lactated ringers bolus, 1000 mL, IV   (1742) Zosyn, 3.375 g, IV   (1651) Tylenol, 650 mg, PO     Emergency Department Course:  Nursing notes and vitals reviewed.  I performed an exam of the patient as documented above.   IV inserted.   Blood was drawn from the patient. This was sent for laboratory testing, findings above.   Urine sample was obtained and sent for laboratory analysis, findings above.   The patient was sent for a chest x-ray while in the emergency department, findings above.     (1632) I rechecked on the patient.   (1701) I rechecked on the patient.   Findings and plan explained to the patient and family who consents to admission.   (1726) I discussed the patient with Dr. Crarasco of the hospitalist service, who will admit the patient to a [facility] bed for further monitoring, evaluation, and treatment.      Impression & Plan      CMS Diagnoses:   The patient has signs of Severe Sepsis as evidenced by:    1. 2 SIRS criteria, AND  2. Suspected infection, AND   3. Organ dysfunction: Lactic Acid >2    Time severe sepsis present = 1505 lactic acid result time      3 Hour Severe Sepsis Bundle Completion:  1. Initial Lactic Acid Result:   Recent  Labs   Lab Test  01/30/17   1505   LACT  2.6*     2. Blood Cultures before Antibiotics: Yes  3. Broad Spectrum Antibiotics Administered: Yes     Anti-infectives (Future)    Start     Dose/Rate Route Frequency Ordered Stop    01/30/17 1555  piperacillin-tazobactam (ZOSYN) 3.375 g vial to attach to  mL bag      3.375 g  over 1 Hours Intravenous ONCE 01/30/17 1554          4. 1500 mL of NS and 1 Liter of lactated ringers. (greater than 30 cc's per kilo)    the patient was transferred out of the ED prior to the 6 hour hamzah.          Medical Decision Making:  This patient presents with fever over the last few days, worsening fatigue, malaise and anorexia. The patient was recently hospitalized for proteus urinary tract infection. The patient does meet criteria for urinary tract infection with severe sepsis. There is no evidence of septic shock. The patient was given IV hydration although his blood pressures always remained adequate. The patient's initial lactate was mildly elevated, a repeat lactate will be going after his first 1500 cc's of fluid. I did order at least 30 cc's per kg of Iv fluid to be administered until the lactate has normalized. Zosyn has been prescribed and administered the patient will be admitted to Dr. Carrasco for further management.       Diagnosis:    ICD-10-CM    1. Severe sepsis (H) A41.9     R65.20    2. Fever, unspecified R50.9    3. Anemia, unspecified type D64.9    4. Bladder infection N30.90    (severe sepsis from urinary tract infection, recurrent)    Disposition:  Admit to Dr. Carrasco     Pending:  Repeat Lactate will be completed after 1500 ml of fluid.      Koby GOODMAN, am serving as a scribe on 1/30/2017 at 3:24 PM to personally document services performed by Dr. Johny Ledbetter based on my observations and the provider's statements to me.      1/30/2017    EMERGENCY DEPARTMENT        Johny Ledbetter MD  01/30/17 [id number]

## 2017-01-30 NOTE — TELEPHONE ENCOUNTER
"DEXTER Hernandez is a 83 year old male who's wife calls with fever. Pt has been seen by HC and wife does not want HC  to continue seen patient. Wife has multiple question need for folate acid. Wife is requesting PCP's advice what to do. Can pt be seen at clinic? Wife was advised pt be seen at ER. Message will be routed to provider for review.    NURSING ASSESSMENT:  Description:  Wife reports pt's \"health is deteriorating\". Sweating profusely light night. Today reports pt complains of lower abdominal pain,possibly urinary retention and anorexia. He is bowel incontinent \"large amount of black poop\". Takes iron.  Onset/duration:  2 days  Precip. factors:  Bladder cancer  Associated symptoms:  See above  Improves/worsens symptoms:  Tylenol help with fever  Pain scale (0-10)  unknown  LMP/preg/breast feeding:  n/a  Last exam/Treatment:    Allergies: No Known Allergies    MEDICATIONS:   Taking medication(s) as prescribed? Yes  Taking over the counter medication(s?) Yes  Any medication side effects? No significant side effects    Any barriers to taking medication(s) as prescribed?  No  Medication(s) improving/managing symptoms?  No  Medication reconciliation completed: Yes      NURSING PLAN: Routed to provider Yes    RECOMMENDED DISPOSITION:  To ED, another person to drive - wife was agreeable with plan.  Will comply with recommendation: Yes  If further questions/concerns or if symptoms do not improve, worsen or new symptoms develop, call your PCP or [facility] as soon as possible.      Guideline used:  Telephone Triage Protocols for Nurses, Fourth Edition, Helen Wooten RN      "

## 2017-01-30 NOTE — TELEPHONE ENCOUNTER
"Call Type: Triage Call    Presenting Problem: \"Bladder cancer\" released two weeks ago from  [facility]. Since yesterday, temperature over 100.1. Pooping and  sweating during the night. Lower abdominal pain.  Has appointment at  1:30 p.m.  Triage Note:  Guideline Title: Cancer, Diagnosed - Adult  Recommended Disposition: See ED Immediately  Original Inclination: Did not know what to do  Override Disposition:  Intended Action: Patient does not know  Physician Contacted: No  New or worsening breathing problems that have not been evaluated OR without a  treatment plan ?  YES  New or worsening signs and symptoms that may indicate shock ? NO  Sudden onset of severe breathing difficulty ? NO  Unconscious now or within last 6 hours ? NO  Chest pain or pressure that may radiate OR be associated with shortness of breath,  nausea or vomiting, diaphoresis, palpitations, lasting 5 minute or more now or  within last hour. ? NO  New neurologic signs, including weakness, numbness, altered mental status,  coordination difficulties, difficulty speaking of any duration that have not been  previously evaluated. ? NO  Physician Instructions:  Care Advice: Another adult should drive.  IMMEDIATE ACTION  Write down provider's name. List or place the following in a bag for  transport with the patient: current prescription and/or nonprescription  medications  alternative treatments, therapies and medications  and street drugs.  "

## 2017-01-30 NOTE — ED NOTES
Bed: ED09  Expected date: 1/30/17  Expected time: 2:41 PM  Means of arrival: Ambulance  Comments:  [facility] Abd Pain UTI 83 male

## 2017-01-30 NOTE — LETTER
Transition Communication Hand-off for Care Transitions to Next Level of Care Provider    Name: Brian Hernandez  MRN #: [id number]  Primary Care Provider: Flip Prakash  Primary Care MD Name: Dwain  Primary Clinic: [facility] [address]  [place] [address]  Primary Care Clinic Name:  [facility]  Reason for Hospitalization:  Bladder infection [N30.90]  Fever, unspecified [R50.9]  Severe sepsis (H) [A41.9, R65.20]  Anemia, unspecified type [D64.9]  Admit Date/Time: 1/30/2017  2:55 PM  Discharge Date: 2/2/17  Payor Source: Payor: MEDICARE / Plan: MEDICARE / Product Type: Medicare /     Readmission Assessment Measure (BRYCE) Risk Score/category:            Reason for Communication Hand-off Referral: Fragility    Discharge Plan: Home w/ [facility] services       Concern for non-adherence with plan of care:   Y/N     Y  Discharge Needs Assessment:  Needs       Most Recent Value    # of Referrals Placed by [facility], Scheduled Follow-up appointments, Communication hand-offs to next level of Care Providers          Already enrolled in Tele-monitoring program and name of program:  No  Follow-up specialty is recommended: No    Follow-up plan:  Future Appointments  Date Time Provider Department Center   2/2/2017 1:30 PM Corry Miller PT [facility]   2/6/2017 2:30 PM Flip Prakash MD [facility]   3/24/2017 2:00 PM Flip Porter MD [facility]       Any outstanding tests or procedures:        Referrals     Future Labs/Procedures    Home care nursing referral     Comments:    RN skilled nursing visit.Resumption of HC services from [facility] .  Home RN to draw a Hgb level 2/9/17 and send results to pcp.     Your provider has ordered home care nursing services. If you have not been contacted within 2 days of your discharge please call the inpatient department phone number at [phone] .    Home Care OT Referral for Hospital Discharge      Comments:    OT to eval and treat    Your provider has ordered home care - occupational therapy. If you have not been contacted within 2 days of your discharge please call the department phone number listed on the top of this document.  Resumption of HC OT services thru [facility].    Home Care PT Referral for Hospital Discharge     Comments:    PT to eval and treat    Your provider has ordered home care - physical therapy. If you have not been contacted within 2 days of your discharge please call the department phone number listed on the top of this document.  Resumption of HC PT services thru [facility]            Key Recommendations:  Sees  in 4 Days thanks  Nory Hatfield    AVS/Discharge Summary is the source of truth; this is a helpful guide for improved communication of patient story

## 2017-01-30 NOTE — IP AVS SNAPSHOT
MRN:[id number]                      After Visit Summary   1/30/2017    Brian Hernandez    MRN: [id number]           Thank you!     Thank you for choosing [facility] for your care. Our goal is always to provide you with excellent care. Hearing back from our patients is one way we can continue to improve our services. Please take a few minutes to complete the written survey that you may receive in the mail after you visit with us. Thank you!        Patient Information     Date Of Birth          2/6/1933        About your hospital stay     You were admitted on:  January 30, 2017 You last received care in the:  [facility] [address] [facility]    You were discharged on:  February 2, 2017        Reason for your hospital stay       You presented with a urinary tract infection causing systemic signs of infection.                  Who to Call     For medical emergencies, please call 911.  For non-urgent questions about your medical care, please call your primary care provider or clinic, [phone]          Attending Provider     Provider    Johny Ledbetter MD Maresh, Andrew Bruce, DO       Primary Care Provider Office Phone # Fax #    Flip Prakash [phone]       [facility] [address]  [facility] [address]        After Care Instructions     Activity       Your activity upon discharge: activity as tolerated            Diet       Follow this diet upon discharge:  Combination Diet 2611-3260 Calories: Moderate Consistent CHO (4-6 CHO units/meal)            Monitor and record       blood glucose 3 times daily                  Follow-up Appointments     Follow Up and recommended labs and tests       Follow up with primary care provider, Flip Prakash, within 7 days for hospital follow- up.  The following labs/tests are recommended: Will need to follow up with his blood sugars.  He was diagnosed with Type II Dm here with a HgbA1c of 6.8.   He was educated on diet and given a blood glucose monitor.  He was not initiated on any medications at this time.                  Your next 10 appointments already scheduled     Feb 06, 2017  2:30 PM   [facility]ip [facility] MD   [facility]    [address]  [address]  [facility] [id number] [phone]           Bring a current list of meds and any records pertaining to this visit.  For Physicals, please bring immunization records and any forms needing to be filled out.  Please arrive 10 minutes early to complete paperwork.            Mar 24, 2017  2:00 PM   Cystoscopy with Flip Porter MD, UA CYF   [facility] ([facility])    [address]  [address]  [facility] [address] [phone]              Additional Services     Home Care OT Referral for Hospital Discharge       OT to eval and treat    Your provider has ordered home care - occupational therapy. If you have not been contacted within 2 days of your discharge please call the department phone number listed on the top of this document.  Resumption of HC OT services thru [facility].            Home Care PT Referral for Hospital Discharge       PT to eval and treat    Your provider has ordered home care - physical therapy. If you have not been contacted within 2 days of your discharge please call the department phone number listed on the top of this document.  Resumption of HC PT services thru [facility]            Home care nursing referral       RN skilled nursing visit.Resumption of HC services from [facility]. Ph .  Home RN to draw a Hgb level 2/9/17 and send results to pcp.     Your provider has ordered home care nursing services. If you have not been contacted within 2 days of your discharge please call the inpatient department phone number at  [phone] .                  Pending Results     Date and Time Order Name Status Description    1/30/2017 1516 Blood culture Preliminary     1/30/2017 1516 Blood culture Preliminary             Statement of Approval     Ordered          02/02/17 0820  I have reviewed and agree with all the recommendations and orders detailed in this document.   EFFECTIVE NOW     Approved and electronically signed by:  Tristen Patricia MD             Admission Information        Provider Department Dept Phone    1/30/2017 Maximiliano Carrasco, DO  [facility] [phone]      Your Vitals Were     Blood Pressure Pulse Temperature Respirations Weight Pulse Oximetry    154/81 mmHg 83 98  F (36.7  C) (Oral) 16 69.582 kg (153 lb 6.4 oz) 95%      MyChart Information     [employer] gives you secure access to your electronic health record. If you see a primary care provider, you can also send messages to your care team and make appointments. If you have questions, please call your primary care clinic.  If you do not have a primary care provider, please call [phone] and they will assist you.        Care EveryWhere ID     This is your Care EveryWhere ID. This could be used by other organizations to access your [facility] medical records  [id number]           Review of your medicines      START taking        Dose / Directions    blood glucose monitoring meter device kit   Commonly known as:  no brand specified   Used for:  Type 2 diabetes mellitus with hyperosmolarity without coma, without long-term current use of insulin (H)        Use to test blood sugar as directed. Please supply a one month supply of test strips, solution and lancets. To monitor sugars 3 times a day.   Quantity:  1 kit   Refills:  0       ciprofloxacin 250 MG tablet   Commonly known as:  CIPRO   Used for:  Urinary tract infection without hematuria, site unspecified        Dose:  250 mg   Take 1 tablet (250 mg) by mouth 2 times daily for 7 days    Quantity:  14 tablet   Refills:  0         CONTINUE these medicines which have NOT CHANGED        Dose / Directions    ACETAMINOPHEN PO        Dose:  650 mg   Take 650 mg by mouth every 4 hours as needed for pain   Refills:  0       CENTRUM SILVER per tablet        Dose:  1 tablet   Take 1 tablet by mouth daily   Quantity:  30 tablet   Refills:  0       citalopram 10 MG tablet   Commonly known as:  celeXA   Used for:  Moderate episode of recurrent major depressive disorder (H)        Dose:  10 mg   Take 1 tablet (10 mg) by mouth daily   Quantity:  30 tablet   Refills:  3       ferrous sulfate 325 (65 FE) MG tablet   Commonly known as:  IRON        Dose:  325 mg   Take 325 mg by mouth 2 times daily   Refills:  0       folic acid 1 MG tablet   Commonly known as:  FOLVITE   Used for:  Iron deficiency anemia due to chronic blood loss        Dose:  1 mg   Take 1 tablet (1 mg) by mouth daily   Quantity:  30 tablet   Refills:  0       labetalol 300 MG tablet   Commonly known as:  NORMODYNE   Used for:  Essential hypertension, CAD (coronary artery disease)        Dose:  300 mg   Take 1 tablet (300 mg) by mouth 2 times daily   Quantity:  180 tablet   Refills:  0       LIPITOR PO        Dose:  20 mg   Take 20 mg by mouth every evening   Refills:  0       polyethylene glycol powder   Commonly known as:  MIRALAX/GLYCOLAX        Dose:  17 g   Take 17 g by mouth daily as needed   Refills:  0       sennosides 8.6 MG tablet   Commonly known as:  SENOKOT   Used for:  Constipation, unspecified constipation type        Dose:  2 tablet   Take 2 tablets by mouth 2 times daily as needed for constipation   Quantity:  120 each   Refills:  0       VITAMIN D (CHOLECALCIFEROL) PO        Dose:  2000 Units   Take 2,000 Units by mouth daily   Refills:  0            Where to get your medicines      These medications were sent to [facility] [place], [place] - [address]  [address], [place] [address]     Phone:   [phone]    - blood glucose monitoring meter device kit      These medications were sent to [id number] - [place], [place] - [address] AT [facility] [address]  [address], [facility] [address]     Phone:  [phone]    - ciprofloxacin 250 MG tablet             Protect others around you: Learn how to safely use, store and throw away your medicines at www.disposemymeds.org.             Medication List: This is a list of all your medications and when to take them. Check marks below indicate your daily home schedule. Keep this list as a reference.      Medications           Morning Afternoon Evening Bedtime As Needed    ACETAMINOPHEN PO   Take 650 mg by mouth every 4 hours as needed for pain   Last time this was given:  650 mg on 1/31/2017  4:27 PM                                blood glucose monitoring meter device kit   Commonly known as:  no brand specified   Use to test blood sugar as directed. Please supply a one month supply of test strips, solution and lancets. To monitor sugars 3 times a day.                                CENTRUM SILVER per tablet   Take 1 tablet by mouth daily                                ciprofloxacin 250 MG tablet   Commonly known as:  CIPRO   Take 1 tablet (250 mg) by mouth 2 times daily for 7 days                                citalopram 10 MG tablet   Commonly known as:  celeXA   Take 1 tablet (10 mg) by mouth daily   Last time this was given:  10 mg on 2/2/2017  8:33 AM                                ferrous sulfate 325 (65 FE) MG tablet   Commonly known as:  IRON   Take 325 mg by mouth 2 times daily   Last time this was given:  325 mg on 2/2/2017  8:33 AM                                folic acid 1 MG tablet   Commonly known as:  FOLVITE   Take 1 tablet (1 mg) by mouth daily   Last time this was given:  1 mg on 2/2/2017  8:33 AM                                labetalol 300 MG tablet   Commonly known as:  NORMODYNE   Take 1  tablet (300 mg) by mouth 2 times daily   Last time this was given:  300 mg on 2/2/2017  8:33 AM                                LIPITOR PO   Take 20 mg by mouth every evening                                polyethylene glycol powder   Commonly known as:  MIRALAX/GLYCOLAX   Take 17 g by mouth daily as needed                                sennosides 8.6 MG tablet   Commonly known as:  SENOKOT   Take 2 tablets by mouth 2 times daily as needed for constipation                                VITAMIN D (CHOLECALCIFEROL) PO   Take 2,000 Units by mouth daily                                          More Information        Healthy Meals for Diabetes  Ask your healthcare team to help you make a meal plan that fits your needs. Your meal plan tells you when to eat your meals and snacks, what kinds of foods to eat, and how much of each food to eat. You don t have to give up all the foods you like. But you do need to follow some guidelines.  Choose healthy carbohydrates    Starches, sugars, and fiber are all types of carbohydrates. Fiber can help lower your cholesterol and triglycerides. Fiber is also healthy for your heart. You should have 20 to 35 grams of total fiber each day. Fiber-rich foods include:    Whole-grain breads and cereals    Bulgur wheat    Brown rice       Whole-wheat pasta    Fruits and vegetables    Dry beans, and peas   It s important to keep track of the amount of carbohydrates you eat. This can help you keep the right balance of physical activity and medicine. The amount of carbohydrates needed will vary for each person. It depends on many things such as your health, the medicines you take, and how active you are. Your healthcare team will help you figure out the right amount of carbohydrates for you. You may start with around 45 to 60 grams of carbohydrates per meal, depending on your situation.   Here are some examples of foods containing about 15 grams of carbohydrates (1 serving of  carbohydrates):    1/2 cup of canned or frozen fruit    A small piece of fresh fruit (4 ounces)    1 slice of bread    1/2 cup of oatmeal    1/3 cup of rice    4 to 6 crackers    1/2 English muffin    1/2 cup of black beans    1/4 of a large baked potato (3 ounces)    2/3 cup of plain fat-free yogurt    1 cup of soup    1/2 cup of casserole    6 chicken nuggets    2-inch square brownie or cake without frosting    2 small cookies    1/2 cup of ice cream or sherbet  Choose healthy protein foods  Eating protein that is low in fat can help you control your weight. It also helps keep your heart healthy. Low-fat protein foods include:    Fish    Plant proteins, such as dry beans and peas, nuts, and soy products like tofu and soymilk    Lean meat with all visible fat removed    Poultry with the skin removed    Low-fat or nonfat milk, cheese, and yogurt  Limit unhealthy fats and sugar  Saturated and trans fats are unhealthy for your heart. They raise LDL (bad) cholesterol. Fat is also high in calories, so it can make you gain weight. To cut down on unhealthy fats and sugar, limit these foods:    Butter or margarine    Palm and palm kernel oils and coconut oil    Cream    Cheese    Guerrero    Lunch meats       Ice cream    Sweet bakery goods such as pies, muffins, and donuts    Jams and jellies    Candy bars    Regular sodas   How much to eat  The amount of food you eat affects your blood sugar. It also affects your weight. Your health care team will tell you how much of each type of food you should eat.    Use measuring cups and spoons and a food scale to measure serving sizes.    Learn what a correct serving size looks like on your plate. This will help when you are away from home and can t measure your servings.    Eat only the number of servings given on your meal plan for each food. Don t take seconds.    Learn to read food labels. Be sure to look at serving size, total carbohydrates, fiber, calories, sugar, and saturated  and trans fats.  When to eat  Your meal plan will likely include breakfast, lunch, dinner, and some snacks.    Try to eat your meals and snacks at about the same times each day.    Eat all your meals and snacks. Skipping a meal or snack can make your blood sugar drop too low. It can also cause you to eat too much at the next meal or snack. Then your blood sugar could get too high.    1690-8749 [employer].. [address] [place] [address], [place] [address]. All rights reserved. This information is not intended as a substitute for professional medical care. Always follow your healthcare professional's instructions.                Understanding Type 2 Diabetes  When your body is working normally, the food you eat is digested and used as fuel. This fuel supplies energy to the body s cells. When you have diabetes, the fuel can t enter the cells. Without treatment, diabetes can cause serious long-term health problems.     Your body breaks down the food you eat into glucose.         How the body gets energy  The digestive system breaks down food, resulting in a sugar called glucose. Some of this glucose is stored in the liver. But most of it enters the bloodstream and travels to the cells to be used as fuel. Glucose needs the help of a hormone called insulin to enter the cells. Insulin is made in the pancreas. It is released into the bloodstream in response to the presence of glucose in the blood. Think of insulin as a key. When insulin reaches a cell, it attaches to the cell wall. This signals the cell to create an opening that allows glucose to enter the cell.  When you have type 2 diabetes  Early in type 2 diabetes, your cells don t respond properly to insulin. Because of this, less glucose than normal moves into cells. This is called insulin resistance. In response, the pancreas makes more insulin. But eventually, the pancreas can t produce enough insulin to overcome insulin resistance. As less and less glucose  enters cells, it builds up to a harmful level in the bloodstream. This is known as high blood sugar or hyperglycemia. The result is type 2 diabetes. The cells become starved for energy, which can leave you feeling tired and rundown.  Why high blood sugar is a problem  If high blood sugar is not controlled, blood vessels throughout the body become damaged. Prolonged high blood sugar affects organs and nerves. As a result, the risks of damage to the heart, kidneys, eyes, and limbs increase. Diabetes also makes other problems, such as high blood pressure and high cholesterol, more dangerous. Over time, people with uncontrolled high blood sugar have an increase in risk of dying of, or being disabled by, heart attack or stroke.    7757-8768 [employer]. [address] [place] [address], [place] [address]. All rights reserved. This information is not intended as a substitute for professional medical care. Always follow your healthcare professional's instructions.                Diet: Diabetes  Food is an important tool that you can use to control diabetes and stay healthy. Eating well-balanced meals in the correct amounts will help you control your blood glucose levels and prevent low blood sugar reactions. It will also help you reduce the health risks of diabetes. There is no one specific diet that is right for everyone with diabetes, rather general guidelines such as those used for weight management, heart health, and cancer prevention. A registered dietitian (RD) will help determine a tailored diet approach for you and your diabetes, as well as help you plan meals and snacks that are healthy to eat. If you have any questions, do not hesitate to call the dietitian for advice.    Guidelines for success  Talk with your doctor before starting a diabetes diet or weight loss program. If you haven't yet consulted a dietitian, ask your doctor for a referral. The following guidelines can help you succeed:    Select foods  from the 6 food groups. Your dietitian will help you find food choices within each group, serving sizes and how many servings you can have at each meal.    Grains, beans, and starchy vegetables    Vegetables    Fruit    Milk or yogurt    Meat, poultry, fish, or tofu    Healthy fats    Monitor your blood sugar levels as your doctor asks. Take any medicine as prescribed by your doctor.    Learn to read nutrition labels and pick appropriate portion sizes.    Eat only the amount of food in your meal plan. Eat about the same amount of food at regular times each day. Do not skip meals. Eat meals 4 to 5 hours apart, with snacks in between.    Limit alcohol. It raises blood sugar levels. Drink water or calorie-free diet drinks that use safe sweeteners.    Eat less fat to help lower your risk of heart disease. Use nonfat or low-fat dairy products and lean meats. Avoid fried foods. Use cooking oils that are unsaturated, such as olive, canola, or peanut oil.    Talk to your dietitian about safe sugar substitutes.    Avoid added salt. It can contribute to high blood pressure, which can cause heart disease. People with diabetes already have a risk of high blood pressure and heart disease.    Maintain a healthy weight. If you need to lose weight, cut down on your portion sizes. But do not skip meals. Exercise is an important part of any weight management program. Talk to your doctor about an exercise program that is right for you.    For more information about the best diet plan for you, talk with a registered dietitian (RD). To obtain a referral to an RD in your area, contact:    Academy of Nutrition and Dietetics www.eatright.org    The American Diabetes Association [phone] www.diabetes.org    7712-6235 [employer]. [address] [place] [address]. All rights reserved. This information is not intended as a substitute for professional medical care. Always follow your healthcare professional's  instructions.                Urinary Tract Infections in Men  Urinary tract infections (UTIs) are most often caused by bacteria (germs) that invade the urinary tract. The bacteria may come from outside the body. Or they may travel from the skin outside of rectum into the urethra. Pain in or around the urinary tract is a common symptom for most UTIs. But the only way to know for sure if you have a UTI is to have a urinalysis and urine culture.     Four Types of UTIs    Cystitis: A bladder infection, or cystitis, is often linked to a blockage from an enlarged prostate. You may have an urgent or frequent need to urinate, and bloody urine. Treatment includes antibiotics and medications to relax or shrink the prostate. In some cases, surgery is needed.    Urethritis: This is an infection of the urethra. You may have a discharge from the urethra or burning when you urinate.You may also have pain in the urethra or penis. Urethritis is treated with antibiotics.    Prostatitis: This is an inflammation or infection of the prostate. You may have an urgent or frequent need to urinate, fever, or burning when you urinate. Or you may have a tender prostate, or a vague feeling of pressure. Prostatitis is treated with a range of medications, depending on the cause.    Pyelonephritis: This is a kidney infection. If not treated, it can be serious and damage your kidneys. In severe cases you may be hospitalized. You may have a fever and upper back pain.  Treating a UTI    Medications: Most UTIs are treated with antibiotics. These kill the bacteria. The length of time you need to take them depends on the type of infection. Take antibiotics exactly as directed until all of the medication is gone. If you do not, the infection may not go away and may become harder to treat. For certain types of UTIs, you may be given other medications to help treat your symptoms.    Lifestyle changes: The lifestyle changes below will help get rid of your  current infection. They may also help prevent future UTIs.    Drink plenty of fluids such as water, juice, or other caffeine-free drinks. This helps flush bacteria out of your system.    Empty your bladder when you feel the urge to urinate and before going to sleep. Urine that stays in your bladder promotes infection.    Use condoms during sex. These help prevent UTIs caused by sexually transmitted bacteria.    Keep follow-up appointments with your health care provider. He or she can may do tests to make sure the infection has cleared. If necessary, additional treatment can be started.    Additional treatment: Most UTIs respond to medication. But sometimes a procedure or surgery is needed. This can treat an enlarged prostate, or remove a kidney stone or other blockage. Surgery may also treat problems caused by scarring or long-term infections.    6612-0495 [employer]. [address] [place] [address], [place] [address]. All rights reserved. This information is not intended as a substitute for professional medical care. Always follow your healthcare professional's instructions.

## 2017-01-30 NOTE — TELEPHONE ENCOUNTER
Reason for Call:  Form, our goal is to have forms completed with 72 hours, however, some forms may require a visit or additional information.    Type of letter, form or note:  [employer]    Who is the form from?: [employer] (if other please explain)    Where did the form come from: form was faxed in    What clinic location was the form placed at?: [facility]    Where the form was placed: Dr's Box: Flip Prakash MD    What number is listed as a contact on the form?: [employer], fax # [phone]       Additional comments: [facility] - Face to Face Encounter    Call taken on 1/30/2017 at 4:11 PM by JOSIAH LUIS

## 2017-01-30 NOTE — IP AVS SNAPSHOT
[facility] [facility]    [address] [place] [address]    Phone:  [phone]                                       After Visit Summary   1/30/2017    Brian Hernandez    MRN: [id number]           After Visit Summary Signature Page     I have received my discharge instructions, and my questions have been answered. I have discussed any challenges I see with this plan with the nurse or doctor.    ..........................................................................................................................................  Patient/Patient Representative Signature      ..........................................................................................................................................  Patient Representative Print Name and Relationship to Patient    ..................................................               ................................................  Date                                            Time    ..........................................................................................................................................  Reviewed by Signature/Title    ...................................................              ..............................................  Date                                                            Time

## 2017-01-30 NOTE — PHARMACY-ADMISSION MEDICATION HISTORY
Admission medication history interview status for the 1/30/2017  admission is complete. See EPIC admission navigator for prior to admission medications     Medication history source reliability:Good    Actions taken by pharmacist (provider contacted, etc):  Interviewed pt's wife     Additional medication history information not noted on PTA med list :None    Medication reconciliation/reorder completed by provider prior to medication history? No    Time spent in this activity: 10 minutes    Prior to Admission medications    Medication Sig Last Dose Taking? Auth Provider   citalopram (CELEXA) 10 MG tablet Take 1 tablet (10 mg) by mouth daily 1/28/2017 Yes Flip Prakash MD   ACETAMINOPHEN PO Take 650 mg by mouth every 4 hours as needed for pain prn Yes Reported, Patient   folic acid (FOLVITE) 1 MG tablet Take 1 tablet (1 mg) by mouth daily 1/28/2017 Yes Papo Ochoa MD   sennosides (SENOKOT) 8.6 MG tablet Take 2 tablets by mouth 2 times daily as needed for constipation prn Yes Papo Ochoa MD   ferrous sulfate (IRON) 325 (65 FE) MG tablet Take 325 mg by mouth 2 times daily  1/28/2017 Yes Reported, Patient   polyethylene glycol (MIRALAX/GLYCOLAX) powder Take 17 g by mouth daily as needed  prn Yes Reported, Patient   labetalol (NORMODYNE) 300 MG tablet Take 1 tablet (300 mg) by mouth 2 times daily 1/28/2017 Yes Whitney Duarte MD   Atorvastatin Calcium (LIPITOR PO) Take 20 mg by mouth every evening  1/28/2017 Yes Reported, Patient   Multiple Vitamins-Minerals (CENTRUM SILVER) per tablet Take 1 tablet by mouth daily 1/28/2017 Yes    VITAMIN D, CHOLECALCIFEROL, PO Take 2,000 Units by mouth daily  1/28/2017 Yes Reported, Patient

## 2017-01-31 NOTE — PROGRESS NOTES
[facility]    Hospitalist Progress Note    Date of Service (when I saw the patient): 01/31/2017    Assessment and Plan  Brian Hernandez is a 83 year old male with a past medical history significant for previous stroke with expressive aphasia, coronary artery disease, bladder cancer, recurrent urinary tract infections, who presents to the Emergency Department with fever and weakness and found to have a urinary tract infection with sepsis.      Severe sepsis with urinary tract infection  Evidenced by tachycardia, fever as well as acute kidney injury and elevated lactic acid.  He was initiated on ceftriaxone on admission.   - Continues on IV Rocephin.  - Blood and urine cx's pending.   - LA normalized.  - Afebrile.   - It was discussed with him that he can follow up with either his primary care physician or his urologist to discuss whether some sort of prophylactic treatment may be warranted.      Chronic kidney disease, stage III, with acute kidney injury  - Creatinine is up around 1.7 with a baseline of roughly 1.2.    - Continues on IVF. Cr 1.56 today.   - BMP in am.     Depression  - Continue with Celexa.      Hypertension  - Continue with labetalol.     Coronary artery disease with previous stents  - He is not on aspirin but is on a beta blocker.      Status post bladder cancer   - followed by Dr. Porter.      Chronic anemia with anemia of chronic disease  - Can resume iron once his acute issues have improved.    Previous stroke with expressive aphasia.       DVT Prophylaxis: Pneumatic Compression Devices  Code Status: DNR/DNI    Disposition: Pending continued clinical improvement.  Follow cx's. PT/OT.        Tristen Patricia       Interval History  No acute overnight events. Vitals stable. Afebrile. No complaints today.     -Data reviewed today: I reviewed all new labs and imaging results over the last 24 hours. I personally reviewed no images or EKG's today.    Physical Exam  Temp: 98.3   F (36.8  C) Temp src: Oral BP: 135/74 mmHg Pulse: 85   Resp: 18 SpO2: 96 % O2 Device: None (Room air)    Filed Vitals:    01/30/17 1832   Weight: 69.582 kg (153 lb 6.4 oz)     Vital Signs with Ranges  Temp:  [98  F (36.7  C)-102.9  F (39.4  C)] 98.3  F (36.8  C)  Pulse:  [] 85  Resp:  [18] 18  BP: (134-164)/(62-90) 135/74 mmHg  SpO2:  [91 %-98 %] 96 %  I/O last 3 completed shifts:  In: 1740 [P.O.:660; I.V.:1080]  Out: -     Gen: Patient in no acute distress.  Appears comfortable.  Heart:  S1S2+, regular rate and rhythm, No murmurs.  Lungs:  Clear to auscultation, no wheezing, no rales.   Abdomen:  Soft, non tender, non distended, bowel sounds positive.  Extremities:  No edema.    Medications    NaCl 100 mL/hr at 01/31/17 0551       citalopram  10 mg Oral Daily     labetalol  300 mg Oral BID     cefTRIAXone  1 g Intravenous Q24H       Data    Recent Labs  Lab 01/31/17  0855 01/30/17  1505   WBC 10.5 10.5   HGB 8.7* 9.8*   MCV 96 95    286    139   POTASSIUM 3.5 3.8   CHLORIDE 108 104   CO2 23 25   BUN 30 33*   CR 1.56* 1.68*   ANIONGAP 11 10   TOR 8.3* 9.1   * 192*       Recent Results (from the past 24 hour(s))   XR Chest 2 Views    Narrative    CHEST TWO VIEWS  1/30/2017 3:50 PM     HISTORY: Pneumonia.    COMPARISON:  8/21/2016.    FINDINGS: Tortuous thoracic aorta. Prior left thoracotomy.  Slight  fibrosis left base. No definite infiltrates or acute abnormality. Left  base opacity on 8/21/2016 has cleared.      Impression    IMPRESSION: Prior left thoracotomy. Nothing acute. No infiltrates.    TI CAMACHO MD

## 2017-01-31 NOTE — PLAN OF CARE
Problem: Goal Outcome Summary  Goal: Goal Outcome Summary  Outcome: No Change  Pt declined to get out of bed.  Repositioned every 2 hours.  No c/o pain.  incont of urine and bm.  Afebrile.  WBC 10.5.  Iv rocephin.  Awaiting cultures.  Blood sugars elevated 200,155 SSI started.  PT ordered.

## 2017-01-31 NOTE — PLAN OF CARE
Problem: Goal Outcome Summary  Goal: Goal Outcome Summary  Outcome: No Change  Pt A/Ox3, disoriented to situation. Expressive aphasia. Assist x2, fall risk. VSS on RA except temp overnight high 101.5. No complaints of pain. Incontinent of urine. Right side weaker than left, baseline. PT consult for today. IVF infusing. D/C pending progress.

## 2017-01-31 NOTE — TELEPHONE ENCOUNTER
Form reviewed, completed, and signed by Dr. Flip Prakash and faxed to [facility].  Edie Singh TC

## 2017-01-31 NOTE — H&P
DATE OF ADMISSION:  01/30/2017      PRIMARY CARE PHYSICIAN:  Flip Prakash MD      CODE STATUS:  DNR/DNI.      CHIEF COMPLAINT:  Fever and weakness.      HISTORY OF PRESENT ILLNESS:  Mr. Brian Hernandez is an 83-year-old male with a past medical history significant for previous stroke with expressive aphasia, coronary artery disease, bladder cancer, rectal cancer and recent recurrent urinary tract infections who presents to the Emergency Department with the above concerns.  History is obtained through discussion with the ED physician, the patient and his family at bedside.  The patient was admitted to [facility] earlier this year from 01/02 through the 5th with a Proteus urinary tract infection and discharged to TCU where he resided until 10 days ago.  For the past couple of days he has been having temperatures of about 101-102 degrees Fahrenheit, which had always come down with some Tylenol.  Yesterday they went as high as 103 and today again got up that high.  The patient has been more weak and a bit more lethargic than typical.  He has also not been eating and drinking as much in the past day or 2.  He has been having a little bit of abdominal pressure but no nausea, emesis or change in his bowel habits.  The wife who is at bedside and is his primary caregiver notes that he typically urinates multiple times per day, but in the past day or 2 has been urinating less frequently.  He denies any chest discomfort or significant shortness of breath.  He has had a cough that has been intermittently productive.      While he was here in the hospital earlier this month he was treated initially with ceftriaxone and transitioned to Cipro when his Proteus came back pansensitive save for nitrofurantoin.  There was some concern that he has had multiple urinary tract infections recently and so he was referred to his urologist to consider some sort of prophylactic treatment.  Previously he had a Wilkins catheter though  this has not been present recently.  He does wear Depends, however, which his wife  works very diligently to change often or as soon as they are soiled.  The patient has recently been started on Celexa with some thoughts that his being weak and not eating or drinking was potentially due to depression.  The fever has been the more recent development.      In the Emergency Department the patient had labs suggestive of acute kidney injury as well as UA suggestive of urinary tract infection.  Blood cultures and urine culture has been obtained.  The patient was initiated on Zosyn for treatment and given multiple liters of IV fluids.      PAST MEDICAL HISTORY:   1.  Previous stroke with expressive aphasia.   2.  Coronary artery disease with history of MI:  He had 2 stents placed in 2003, 3 stents in 2008 and had an angiogram in 2010 showing patent arteries.   3.  Hypertension.   4.  Dyslipidemia.   5.  Atrial fibrillation.   6.  Bladder cancer followed by Dr. Porter with a recent cystoscopy last year.   7.  Rectal cancer.   8.  Peripheral vascular disease.   9.  Pulmonary hypertension.   10.  GERD.   11.  Osteoarthritis.   12.  Depression.   13.  Chronic kidney disease, stage III, with a baseline creatinine of roughly 1.2-1.4.   14.  Recurrent urinary tract infections.   15.  Left carotid endarterectomy.      PAST SURGICAL HISTORY:   1.  Left carotid endarterectomy in 2014.   2.  Eye surgery for cataracts.   3.  Status post hemicolectomy.   4.  Lung surgery for nodule.   5.  Bilateral hip replacements.   6.  ORIF of the periprosthetic hip fracture in 2016.   7.  Cystoscopy with TURP 3 times in 2016.      MEDICATIONS:    Prior to Admission medications    Medication Sig Last Dose Taking? Auth Provider   blood glucose (NO BRAND SPECIFIED) lancets standard Use to test blood sugar 3 times daily or as directed.  Yes Tristen Patricia MD   blood glucose calibration (NO BRAND SPECIFIED) solution Use to calibrate blood  glucose monitor as directed.  Yes Tristen Patricia MD   blood glucose monitoring (NO BRAND SPECIFIED) meter device kit Use to test blood sugars 3 times daily or as directed  Yes Tristen Patricia MD   blood glucose monitoring (NO BRAND SPECIFIED) test strip Use to test blood sugars 3 times daily or as directed  Yes Tristen Patricia MD   citalopram (CELEXA) 10 MG tablet Take 1 tablet (10 mg) by mouth daily 2017 Yes Flip Prakash MD   ACETAMINOPHEN PO Take 650 mg by mouth every 4 hours as needed for pain prn Yes Reported, Patient   folic acid (FOLVITE) 1 MG tablet Take 1 tablet (1 mg) by mouth daily 2017 Yes Papo Ochoa MD   sennosides (SENOKOT) 8.6 MG tablet Take 2 tablets by mouth 2 times daily as needed for constipation prn Yes Papo Ochoa MD   ferrous sulfate (IRON) 325 (65 FE) MG tablet Take 325 mg by mouth 2 times daily  2017 Yes Reported, Patient   polyethylene glycol (MIRALAX/GLYCOLAX) powder Take 17 g by mouth daily as needed  prn Yes Reported, Patient   labetalol (NORMODYNE) 300 MG tablet Take 1 tablet (300 mg) by mouth 2 times daily 2017 Yes Whitney Duarte MD   Atorvastatin Calcium (LIPITOR PO) Take 20 mg by mouth every evening  2017 Yes Reported, Patient   Multiple Vitamins-Minerals (CENTRUM SILVER) per tablet Take 1 tablet by mouth daily 2017 Yes    VITAMIN D, CHOLECALCIFEROL, PO Take 2,000 Units by mouth daily  2017 Yes Reported, Patient   ciprofloxacin (CIPRO) 500 MG tablet Take 1 tablet (500 mg) by mouth 2 times daily for 7 days   Flip Prakash MD           SOCIAL HISTORY:  The patient quit smoking in  and continues to drink beer and octavio.  Lives with his wife.      FAMILY HISTORY:  Mother  of advanced age and father  at a very young age.      ALLERGIES:  No known drug allergies.      REVIEW OF SYSTEMS:  The complete review of systems was reviewed and negative, save for the pertinent positives  recorded in the HPI.      PHYSICAL EXAMINATION:   VITAL SIGNS:  Show blood pressure 164/90, heart rate in the 110s, respirations 18, satting 91% on room air with temperature of 102.9 degrees Fahrenheit.   GENERAL:  The patient is lying in bed and is ill appearing.   HEENT:  Pupils equal, round, reactive to light, extraocular muscle function intact.  No scleral icterus.  Oropharynx is clear.   NECK:  No lymphadenopathy or thyromegaly.   CARDIOVASCULAR:  Heart is tachycardic without any murmur, rub or gallop.   PULMONARY:  Lungs are clear to auscultation bilaterally without wheeze or rhonchi.   GASTROINTESTINAL:  Positive bowel sounds, soft, with some mild tenderness in the suprapubic region.  No rebound or guarding.   PSYCHIATRIC:  The patient has expressive aphasia.   NEUROLOGIC:  The patient has expressive aphasia.  No other focal neurologic deficits are noted.  The patient moves all extremities.      LABORATORY DATA:  WBC count 10.5, hemoglobin 9.8 and platelets of 286,000.  Sodium 139, potassium 3.8, chloride 104, CO2 25, BUN 33, creatinine 1.68.  Lactic acid of 2.6.  UA as above.  Influenza swab is negative.      Chest x-ray is negative.      ASSESSMENT AND PLAN:  Mr. Hernandez is an 83-year-old male with a past medical history significant for previous stroke with expressive aphasia, coronary artery disease, bladder cancer, recurrent urinary tract infections, who presents to the Emergency Department with fever and weakness and found to have a urinary tract infection with sepsis.   1.  Severe sepsis with urinary tract infection:  Evidenced by tachycardia, fever as well as acute kidney injury and elevated lactic acid.  He will be initiated on ceftriaxone and will follow up blood and urine cultures.  He is being given intravenous fluids and will recheck a lactic acid expecting some improvement.  I did discuss that he can follow up with either his primary care physician or his urologist to discuss whether some sort  of prophylactic treatment may be warranted.   2.  Chronic kidney disease, stage III, with acute kidney injury:  Creatinine is up around 1.7 with a baseline of roughly 1.2.  Will be hydrated with normal saline and recheck in the morning.   3.  Previous stroke with expressive aphasia.   4.  Depression:  Continue with Celexa.   5.  Hypertension:  Continue with labetalol.   6.  Dyslipidemia:  Can resume Lipitor upon discharge.   7.  Coronary artery disease with previous stents:  He is not on aspirin but is on a beta blocker.   8.  Status post bladder cancer followed by Dr. Porter.   9.  Chronic anemia with anemia of chronic disease:  Can resume iron once his acute issues have improved.   10.  Deep venous thrombosis prophylaxis:  Sequential compression devices.         CALE GALAN DO             D: 2017 18:02   T: 2017 19:09   MT: [id number]      Name:     DAVID BINGHAM   MRN:      [id number]        Account:      [id number]   :      1933           Admitted:     [id number]      Document: [id number]

## 2017-01-31 NOTE — PROGRESS NOTES
01/31/17 1300   Quick Adds   Type of Visit Initial PT Evaluation   Living Environment   Lives With spouse   Living Arrangements house   Home Accessibility stairs to enter home;stairs within home   Number of Stairs to Enter Home 2   Number of Stairs Within Home 12  (split entry)   Stair Railings at Home inside, present on right side   Living Environment Comment unceratin of home envoronment seconary to aphasia   Functional Level Prior   Prior Functional Level Comment Uncertain of PLOF, since disch from TCU, pt unable to provide hx , appears to have amb with FWW, spouse appears to have A with ADL's   General Information   Onset of Illness/Injury or Date of Surgery - Date 01/30/17   Referring Physician Omayra Wayne   Patient/Family Goals Statement none stated   Pertinent History of Current Problem (include personal factors and/or comorbidities that impact the POC) Brian Hernandez is a 83 year old male with a past medical history significant for previous stroke with expressive aphasia, coronary artery disease, bladder cancer, recurrent urinary tract infections, who presents to the Emergency Department with fever and weakness and found to have a urinary tract infection with sepsis.   Precautions/Limitations fall precautions   Weight-Bearing Status - LUE full weight-bearing   Weight-Bearing Status - RUE full weight-bearing   Weight-Bearing Status - LLE full weight-bearing   Weight-Bearing Status - RLE full weight-bearing   Cognitive Status Examination   Orientation person   Level of Consciousness alert   Follows Commands and Answers Questions able to follow single-step instructions   Pain Assessment   Patient Currently in Pain No   Strength   Strength Comments generalized decreased in strength B LE R> L, able to sit to stand with CGA and FWW, able to move LE off bed,    Bed Mobility   Bed Mobility Comments bed mob with SBA and A for set up   Transfer Skills   Transfer Comments sit to stand with CGA and FWW   Gait  "  Gait Comments amb with CGA and FWW 40'   Balance   Balance Comments decreased balance in standing dynamic requiring UE support, no LOB with amb with FWW   General Therapy Interventions   Planned Therapy Interventions gait training;strengthening;bed mobility training;transfer training;balance training   Clinical Impression   Criteria for Skilled Therapeutic Intervention yes, treatment indicated   PT Diagnosis dificulty walking    Influenced by the following impairments decreased balance, strength and act eddie   Functional limitations due to impairments impaired functiona mob I and safety   Clinical Presentation Stable/Uncomplicated   Clinical Presentation Rationale decreased act eddie limiting functional mob, decreased balance, fatigue   Clinical Decision Making (Complexity) Low complexity   Therapy Frequency` daily   Predicted Duration of Therapy Intervention (days/wks) 4 days   Anticipated Discharge Disposition Transitional Care Facility;Home with Assist   Risk & Benefits of therapy have been explained Yes   Patient, Family & other staff in agreement with plan of care Yes   [facility] TM \"6 Clicks\"   2016, Trustees of [facility], under license to [employer].  All rights reserved.   6 Clicks Short Forms Basic Mobility Inpatient Short Form   [facility]  \"6 Clicks\" V.2 Basic Mobility Inpatient Short Form   1. Turning from your back to your side while in a flat bed without using bedrails? 4 - None   2. Moving from lying on your back to sitting on the side of a flat bed without using bedrails? 4 - None   3. Moving to and from a bed to a chair (including a wheelchair)? 4 - None   4. Standing up from a chair using your arms (e.g., wheelchair, or bedside chair)? 3 - A Little   5. To walk in hospital room? 4 - None   6. Climbing 3-5 steps with a railing? 3 - A Little   Basic Mobility Raw Score (Score out of 24.Lower scores equate to lower levels of function) 22   Total Evaluation Time "   Total Evaluation Time (Minutes) 15

## 2017-01-31 NOTE — TELEPHONE ENCOUNTER
Reason for Call:  Form, our goal is to have forms completed with 72 hours, however, some forms may require a visit or additional information.    Type of letter, form or note:  [employer]    Who is the form from?: [employer] (if other please explain)    Where did the form come from: form was faxed in    What clinic location was the form placed at?: [facility]    Where the form was placed: Dr's Box: Flip Prakash MD    What number is listed as a contact on the form?: [employer], fax # [phone]       Additional comments: Physician Orders    Call taken on 1/31/2017 at 10:26 AM by JOSIAH LUIS

## 2017-01-31 NOTE — PLAN OF CARE
Problem: Goal Outcome Summary  Goal: Goal Outcome Summary  PT eval completed and treatment initiated,  Pt admitted for UTI with sepsis, with hx of CVI with R sided weakness and expressive aphasia per chart.  Pt's previous level of function is somewhat unclear due to communication issues with aphsia.  It appears he lives with his spouse in a house with 2 stairs to enter and a split entry with bedroom and bath on upper level (6 steps) kitchen on main.  Pt was amb with FWW, uncertain how he managed stairs.  He was discharged from TCU 10 days ago to return home.   Pt's current level of function requires up to min A for mob and transfers and amb 240' with FWW.  Pt's deficits include generalized weakness with decreased functional endurance and mild balance deficits with baseline R sided weakness s/p CVI. Pending progress, anticipate pt will be able to return to home with A, would benefit from home PT for safety eval.  Pt will need to pass stair training.

## 2017-02-01 NOTE — PLAN OF CARE
Problem: Goal Outcome Summary  Goal: Goal Outcome Summary  Outcome: Improving  Pt A&Ox3; disoriented to situation. Pt has expressive aphasia. VSS on ra, afebrile. Up with 1/walker. Incontinent of b/b. Denies pain. /165. Started on PO antibiotics. Tolerating diet well, good PO intake. Likely to d/c home tomorrow. Con't to monitor.

## 2017-02-01 NOTE — PROGRESS NOTES
D: D/c Planning. It is noted Hospitalist note indicates pt could d/c from hosp tomorrow. Look into home needs  I: Pt is open to Home [facility]- Ph  and fax .  [facility] verifies pt is currently open to home services of RN, PT, OT.  Informed of poss hosp d/c tomorrow.  [facility] request on d/c AVS, H & P to be faxed.  Printed H & P Today and faxed that.  P: Resumption of HH services on d/c.Orders placed in chart per ongoing support of med team.  Nory Liu Care Transitions Nurse,  RN, BSN, CCM  [facility]  Cell Phone # [phone]    2/2 faxed AVS w/ orders for HC, Face sheet to above fax #. Called HC Agency to notify all documentation faxed. [facility] Intake staff @ [facility] informed of d/c paperwork faxed and will call care coord if issues arise.      Pt has post hosp PCP appt scheduled.

## 2017-02-01 NOTE — PLAN OF CARE
Problem: Goal Outcome Summary  Goal: Goal Outcome Summary  Outcome: No Change  Pt alert and oriented x3, disoriented to situation. VSS on room air, lung sounds clear. Denies pain. Urinary incontinence this shift. Turn repo q2h. , 126. No sliding scale indicated. NS running at 50 ml/hr. Will continue to monitor.

## 2017-02-01 NOTE — PROGRESS NOTES
[facility]    Hospitalist Progress Note    Date of Service (when I saw the patient): 02/01/2017    Assessment and Plan  Brian Hernandez is a 83 year old male with a past medical history significant for previous stroke with expressive aphasia, coronary artery disease, bladder cancer, recurrent urinary tract infections, who presents to the Emergency Department with fever and weakness and found to have a urinary tract infection with sepsis.      Severe sepsis with urinary tract infection  Evidenced by tachycardia, fever as well as acute kidney injury and elevated lactic acid.  He was initiated on ceftriaxone on admission.  - Urine Cx with > 100K Proteus Mirabilis sens to Rocephin.       - IV Rocephin --> to po Cefixime 2/1.  - Blood cx's BGTD.    - LA normalized.  - Fever yesterday 101.4.   - It was discussed with him that he can follow up with either his primary care physician or his urologist to discuss whether some sort of prophylactic treatment may be warranted.       Chronic kidney disease, stage III, with acute kidney injury  - Creatinine is up around 1.7 with a baseline of roughly 1.2.     - Continues on IVF. Cr 1.56-->1.49.        - BMP pending.      Hyperglycemia  Sugars noted to be elevated 126-237 while here.  A1c 6.8.  No reported h/o DM.   - Mod carb diet and RISS ordered.   - Recommend diet education and glucose monitoring teaching with follow up with his pcp for medication discussion.        Depression  - Continue with Celexa.      Hypertension  - Continue with labetalol.      Coronary artery disease with previous stents  - He is not on aspirin but is on a beta blocker.      Status post bladder cancer    - followed by Dr. Porter.       Chronic anemia with anemia of chronic disease  - Baseline appears to be in the 8's. He is on IRT at home bid.   - Continue with ferrous sulfate.   - Re-check iron panel.     Previous stroke with expressive aphasia.       DVT Prophylaxis: Pneumatic  Compression Devices  Code Status: DNR/DNI    Disposition: Follow renal fxn.  IV-->po Abx.  Anticipate home tomorrow.       Tristen Patricia       Interval History  No acute overnight events.  Vitals stable. Fever yesterday at 101.4.  Up with PT today and tolerating diet. BMP pending.     -Data reviewed today: I reviewed all new labs and imaging results over the last 24 hours. I personally reviewed no images or EKG's today.    Physical Exam  Temp: 98.6  F (37  C) Temp src: Oral BP: 153/85 mmHg Pulse: 89   Resp: 18 SpO2: 94 % O2 Device: None (Room air)    Filed Vitals:    01/30/17 1832   Weight: 69.582 kg (153 lb 6.4 oz)     Vital Signs with Ranges  Temp:  [98  F (36.7  C)-101.4  F (38.6  C)] 98.6  F (37  C)  Pulse:  [86-93] 89  Resp:  [2-18] 18  BP: (143-157)/(74-85) 153/85 mmHg  SpO2:  [92 %-94 %] 94 %  I/O last 3 completed shifts:  In: 1395 [P.O.:480; I.V.:915]  Out: -     Gen: Patient in no acute distress.  Appears comfortable.  Heart:  S1S2+, regular rate and rhythm, No murmurs.  Lungs:  Clear to auscultation, no wheezing, no rales. Decreased at bases.    Abdomen:  Soft, non tender, non distended, bowel sounds positive.  Extremities:  No edema.    Medications    NaCl 50 mL/hr at 01/31/17 1922       insulin aspart  1-4 Units Subcutaneous Q4H     citalopram  10 mg Oral Daily     labetalol  300 mg Oral BID     cefTRIAXone  1 g Intravenous Q24H       Data    Recent Labs  Lab 01/31/17  0855 01/30/17  1505   WBC 10.5 10.5   HGB 8.7* 9.8*   MCV 96 95    286    139   POTASSIUM 3.5 3.8   CHLORIDE 108 104   CO2 23 25   BUN 30 33*   CR 1.56* 1.68*   ANIONGAP 11 10   TOR 8.3* 9.1   * 192*       No results found for this or any previous visit (from the past 24 hour(s)).

## 2017-02-01 NOTE — PLAN OF CARE
Problem: Goal Outcome Summary  Goal: Goal Outcome Summary  Outcome: No Change  VSS. Temp of 101.4 decrease to 98.0 with acetaminophen. A&O x4, forgetful. Repo Q2. Incontinent. IVF infusing @ 50ml/hr. , 129. Denies pain. Will continue to monitor.

## 2017-02-01 NOTE — PLAN OF CARE
Problem: Goal Outcome Summary  Goal: Goal Outcome Summary  PT: Patient agreeable to participate in therapy session. Patient performed supine <> sit, standby assist. Sit <> stand with walker and contact guard assist. Patient ambulated 200 feet with walker and contact guard assist, noted occasional episodes of unsteadiness. Patient agreeable to sit up in chair at end of session. PT Recommendation: Discharge home with increased assist from spouse and Home PT. Will assess stairs as able.

## 2017-02-02 NOTE — PLAN OF CARE
Problem: Goal Outcome Summary  Goal: Goal Outcome Summary  Outcome: Improving  VSS, afebrile, O2 wnl RA. A&Ox3, disoriented to situation, calm and cooperative. Pt has expressive aphasia from hx of stroke. Tolerating mod carb diet, good appetite. Blood sugars 140 and 121. Denies CP/SOB. Incontinent of bowel and bladder. BM x1/small. Hgb 7.6, no signs of active bleeding. On po cefexime daily. Possible dc home tomorrow. RN will cont to monitor.

## 2017-02-02 NOTE — PROGRESS NOTES
D: Pt sleeping initially during d/c planning discussion  I: Care Coord (CC) met w/ wife @ bedside and discussed current POC for pt per therapy recommendation to return home w/ HC RN/ PT/OT services thru established [facility] company[employer]  Wife is confused at this time if they were still open to [employer].  Wife reassured company is in agreement w/ ongoing services and company has been notified of planned hosp d/c.  Wife is concerned that pt has been experiencing fatigue prior to hospitalization and lacked ability to fully care for his ADL's.  Reviewed w/ wife most recent PT note w/ only desired assessment ? Is stair assessment if time allows.  Wife is reassured that PT assessment= home w/ only HC needs estimated. P: Wife is in agreement prior d/c wife can observe pt's ability to get OOB and ambulation to bathroom.     Reviewed w/ wife plan for post hosp oral ABX--she expressed concern and questioned if pt was d/c w/ home abx.  Wife is also interested in meeting w/ Hospitalist prior to pt's d/c.  T Paged provider whom, per Bedside Nurse, was here earlier--thinking wife was planning to be here earlier this AM.   Showed wife AVS w/ Post Hosp provider visit planned next week, beginning of the week, w/ lab draw w/ [facility] agency to assist w/ f/u on lower Hgb, wife shown lab values of Hgb last 2 days=Stable. And wife shown phone # on AVS for [employer].  A: All of wife's questions answered r/t d/c planning.  Dr. Patricia now on site  P: D/C [facility]--unless notified of new POC.    Nory Hatfield Care Transitions Nurse,  RN, BSN, [facility]  Cell Phone # [phone]

## 2017-02-02 NOTE — PLAN OF CARE
Problem: Goal Outcome Summary  Goal: Goal Outcome Summary  PT: Patient sitting EOB upon arrival of therapist awaiting discharge, yet agreeable to participate in therapy to attempt stairs. Patient performed sit <> stand with walker and standby assist, cues for hand placement. Patient ambulated 75 feet and 150 feet with walker and contact guard assist for safety. Patient ascended/descended 10 stairs with 1 railing requiring contact guard assist. PT Recommendation: Discharge home with assist from spouse and Home PT.     Physical Therapy Discharge Summary    Reason for therapy discharge:    Discharged to home with home therapy.    Progress towards therapy goal(s). See goals on Care Plan in [facility] electronic health record for goal details.  Goals not met.  Barriers to achieving goals:   discharge from facility.    Therapy recommendation(s):    Continued therapy is recommended.  Rationale/Recommendations:  Patient will benefit from Home PT in order to improve independence and safety with functional mobility.

## 2017-02-02 NOTE — DISCHARGE SUMMARY
[facility]    Discharge Summary  Hospitalist    Date of Admission:  1/30/2017  Date of Discharge:  2/2/2017  Discharging Provider: Tristen Patricia  Date of Service (when I saw the patient): 02/02/2017    Discharge Diagnoses    Severe sepsis with urinary tract infection  Chronic kidney disease, stage III, with acute kidney injury  Type II DM - New Dx  Chronic anemia with anemia of chronic disease  Status post bladder cancer    Depression  Hypertension  Coronary artery disease with previous stents      History of Present Illness  Brian Hernandez is a 83 year old male with a past medical history significant for previous stroke with expressive aphasia, coronary artery disease, bladder cancer, recurrent urinary tract infections, who presents to the Emergency Department with fever and weakness and found to have a urinary tract infection with sepsis.    Hospital Course  Brian Hernandez was admitted on 1/30/2017.  The following problems were addressed during his hospitalization:        Severe sepsis with urinary tract infection  Evidenced by tachycardia, fever as well as acute kidney injury and elevated lactic acid.  He was initiated on ceftriaxone on admission.  Urine Cx with > 100K Proteus Mirabilis sens to Rocephin. IV Rocephin transitioned to po to po Cipro on d/c to complete a 10 day coarse.  Blood cx's NGTD. LA normalized.  It was discussed with him that he can follow up with either his primary care physician or his urologist to discuss whether some sort of daily prophylactic treatment may be warranted.         Chronic kidney disease, stage III, with acute kidney injury  Creatinine is up around 1.7 with a baseline of roughly 1.2.  Improved to 1.45 with IVF.       Type II DM - New Dx  Sugars noted to be elevated 126-237 while here.  A1c 6.8.  No reported h/o DM.  Informed him of his new DM Dx and informed his wife as well. Diet education given and glucose monitoring teaching was completed.   Will follow up with his pcp for medication discussion.  Do not feel urgent initiation of medications is needed at this time.  Especially in this 82 y/o M with a A1c of 6.8.          Depression  Continue with Celexa.      Hypertension  Continue with labetalol.      Coronary artery disease with previous stents  He is not on aspirin but is on a beta blocker.      Status post bladder cancer    followed by Dr. Porter.       Chronic anemia with anemia of chronic disease  Baseline appears to be in the 8's. He is on IRT at home bid. Hgb stable at 7.8 on d/c. Suspect some dilutional effect. No sings of bleeding.          Previous stroke with expressive aphasia.         Tristen Patricia      Significant Results and Procedures  See below    Pending Results  These results will be followed up by PCP  Unresulted Labs Ordered in the Past 30 Days of this Admission     Date and Time Order Name Status Description    1/30/2017 1516 Blood culture Preliminary     1/30/2017 1516 Blood culture Preliminary           Code Status  DNR / DNI       Primary Care Physician  Flip Prakash    Physical Exam  Temp: 98  F (36.7  C) Temp src: Oral BP: 154/81 mmHg Pulse: 83   Resp: 16 SpO2: 95 % O2 Device: None (Room air)    Filed Vitals:    01/30/17 1832   Weight: 69.582 kg (153 lb 6.4 oz)     Vital Signs with Ranges  Temp:  [98  F (36.7  C)-99.4  F (37.4  C)] 98  F (36.7  C)  Pulse:  [77-89] 83  Resp:  [14-18] 16  BP: (148-176)/() 154/81 mmHg  SpO2:  [93 %-98 %] 95 %  I/O last 3 completed shifts:  In: 1697 [P.O.:1020; I.V.:677]  Out: -     Gen: Patient in no acute distress.  Appears comfortable.  Neuro: Expressive Aphasia.   Heart:  S1S2+, regular rate and rhythm, No murmurs.  Lungs:  Clear to auscultation, no wheezing, no rales.   Abdomen:  Soft, non tender, non distended, bowel sounds positive.  Extremities:  No edema.    Discharge Disposition  Discharged to home  Condition at discharge: Stable    Consultations This Hospital  Stay  PHYSICAL THERAPY ADULT IP CONSULT  NUTRITION SERVICES ADULT IP CONSULT    Time Spent on This Encounter  I, Tristen Patricia, personally saw the patient today and spent greater than 30 minutes discharging this patient.    Discharge Orders    Home Care PT Referral for Hospital Discharge     Home Care OT Referral for Hospital Discharge     Home care nursing referral     Reason for your hospital stay   You presented with a urinary tract infection causing systemic signs of infection.     Follow Up and recommended labs and tests   Follow up with primary care provider, Flip Prakash, within 7 days for hospital follow- up.  The following labs/tests are recommended: Will need to follow up with his blood sugars.  He was diagnosed with Type II Dm here with a HgbA1c of 6.8.  He was educated on diet and given a blood glucose monitor.  He was not initiated on any medications at this time.     Activity   Your activity upon discharge: activity as tolerated     Monitor and record   blood glucose 3 times daily     DNR/DNI     DNR/DNI     Diet   Follow this diet upon discharge:  Combination Diet 0328-7275 Calories: Moderate Consistent CHO (4-6 CHO units/meal)       Discharge Medications  Current Discharge Medication List      START taking these medications    Details   ciprofloxacin (CIPRO) 250 MG tablet Take 1 tablet (250 mg) by mouth 2 times daily for 7 days  Qty: 14 tablet, Refills: 0    Associated Diagnoses: Urinary tract infection without hematuria, site unspecified         CONTINUE these medications which have NOT CHANGED    Details   blood glucose monitoring (NO BRAND SPECIFIED) meter device kit Use to test blood sugar as directed.  Please supply a one month supply of test strips, solution and lancets.  To monitor sugars 3 times a day.  Qty: 1 kit, Refills: 0    Associated Diagnoses: Type 2 diabetes mellitus with hyperosmolarity without coma, without long-term current use of insulin (H)      citalopram  (CELEXA) 10 MG tablet Take 1 tablet (10 mg) by mouth daily  Qty: 30 tablet, Refills: 3    Associated Diagnoses: Moderate episode of recurrent major depressive disorder (H)      ACETAMINOPHEN PO Take 650 mg by mouth every 4 hours as needed for pain      folic acid (FOLVITE) 1 MG tablet Take 1 tablet (1 mg) by mouth daily  Qty: 30 tablet    Associated Diagnoses: Iron deficiency anemia due to chronic blood loss      sennosides (SENOKOT) 8.6 MG tablet Take 2 tablets by mouth 2 times daily as needed for constipation  Qty: 120 each    Associated Diagnoses: Constipation, unspecified constipation type      ferrous sulfate (IRON) 325 (65 FE) MG tablet Take 325 mg by mouth 2 times daily       polyethylene glycol (MIRALAX/GLYCOLAX) powder Take 17 g by mouth daily as needed       labetalol (NORMODYNE) 300 MG tablet Take 1 tablet (300 mg) by mouth 2 times daily  Qty: 180 tablet, Refills: 0    Associated Diagnoses: Essential hypertension; CAD (coronary artery disease)      Atorvastatin Calcium (LIPITOR PO) Take 20 mg by mouth every evening       Multiple Vitamins-Minerals (CENTRUM SILVER) per tablet Take 1 tablet by mouth daily  Qty: 30 tablet      VITAMIN D, CHOLECALCIFEROL, PO Take 2,000 Units by mouth daily            Allergies  No Known Allergies  Data  Most Recent 3 CBC's:  Recent Labs   Lab Test  02/02/17   0805 02/01/17   1305  01/31/17   0855  01/30/17   1505 01/18/17   WBC   --    --   10.5  10.5  8.6   HGB  7.8*  7.6*  8.7*  9.8*  9.1*   MCV   --    --   96  95  97.7   PLT   --    --   214  286  299      Most Recent 3 BMP's:  Recent Labs   Lab Test  02/02/17   0805  02/01/17   1305  01/31/17   0855  01/30/17   1505   NA   --   139  142  139   POTASSIUM   --   3.6  3.5  3.8   CHLORIDE   --   107  108  104   CO2   --   23  23  25   BUN   --   29  30  33*   CR  1.45*  1.49*  1.56*  1.68*   ANIONGAP   --   9  11  10   TOR   --   8.0*  8.3*  9.1   GLC   --   237*  200*  192*     Most Recent 2 LFT's:  Recent Labs   Lab Test   01/02/17   1911  10/31/16   1040   AST  27  13   ALT  39  18   ALKPHOS  78  97   BILITOTAL  0.4  0.3     Most Recent INR's and Anticoagulation Dosing History:        Anticoagulation Dose History     Recent Dosing and Labs Latest Ref Rng 4/4/2008 4/11/2008 5/18/2010 8/17/2016 12/9/2016    INR 0.86 - 1.14 1.07 1.09 1.02 1.12 1.12        Most Recent 3 Troponin's:  Recent Labs   Lab Test  05/29/15   0005  05/28/15   1825  05/28/15   1215   TROPI  0.028  0.023  0.032     Most Recent Cholesterol Panel:  Recent Labs   Lab Test  06/15/15   1519   CHOL  130   LDL  59   HDL  49   TRIG  110     Most Recent 6 Bacteria Isolates From Any Culture (See EPIC Reports for Culture Details):  Recent Labs   Lab Test  01/30/17   1616  01/30/17   1611  01/30/17   1505  01/02/17   2011  10/31/16   1300  11/14/15   1116   CULT  No growth after 3 days  >100,000 colonies/mL Proteus mirabilis*  No growth after 3 days  >100,000 colonies/mL Proteus mirabilis*  <10,000 colonies/mL urogenital andi  <10,000 colonies/mL urogenital andi Susceptibility testing not routinely done     Most Recent TSH, T4 and A1c Labs:  Recent Labs   Lab Test  01/31/17   0855   05/28/15   1215  03/18/14   1006   TSH   --    --   3.04  0.07*   T4   --    --    --   1.62   A1C  6.8*   < >   --   6.2*    < > = values in this interval not displayed.       Results for orders placed or performed during the hospital encounter of 01/30/17   XR Chest 2 Views    Narrative    CHEST TWO VIEWS  1/30/2017 3:50 PM     HISTORY: Pneumonia.    COMPARISON:  8/21/2016.    FINDINGS: Tortuous thoracic aorta. Prior left thoracotomy.  Slight  fibrosis left base. No definite infiltrates or acute abnormality. Left  base opacity on 8/21/2016 has cleared.      Impression    IMPRESSION: Prior left thoracotomy. Nothing acute. No infiltrates.    TI CAMACHO MD

## 2017-02-02 NOTE — PLAN OF CARE
Problem: Goal Outcome Summary  Goal: Goal Outcome Summary  Pt A&Ox2; disoriented to situation and time. Pt has expressive aphasia;hs of stroke. VSS, RA. Afebrile. No c/o pain, SOB. Hgb 7.6, no s/s bleeding. Up w/ 1/walker. Incontinent of bladder; frequently urinating. . On PO antibiotics. Mod carb diet. Anticipating d/c home today.         small BM x2.

## 2017-02-02 NOTE — PROGRESS NOTES
Clinic Care Coordination Contact  Care Team Conversations    Patient admitted via ED to [facility] 01/30/2017- 02/02/17  for sepsis with UTI.  D/c'd on po Cefixime   While inpatient, noted to have elevated -237.  A1c 6.8.  No reported h/o DM.  Was advised mob carb diet.   Recommended to have diet education and glucose monitoring teaching. F/u with PCP to discuss medication.     Call placed to [facility], verified they have received appropriate orders.  RN, PT, OT. Start of care is today.   Provided Clinic RN CCC contact information, requesting callback when patient is approaching d/c from home care.     Jaleesa May RN  Clinic Care Coordinator  [facility]  [phone]

## 2017-02-02 NOTE — TELEPHONE ENCOUNTER
Reason for Call:  Form, our goal is to have forms completed with 72 hours, however, some forms may require a visit or additional information.    Type of letter, form or note:  [employer]    Who is the form from?: [employer] (if other please explain)    Where did the form come from: form was faxed in    What clinic location was the form placed at?: [facility]    Where the form was placed: Dr's Box: Flip Prakash MD    What number is listed as a contact on the form?: [employer], fax # [phone]       Additional comments: OCCUPATIONAL THERAPY Patient Missed Visit    Call taken on 2/2/2017 at 9:30 AM by JOSIAH LUIS

## 2017-02-02 NOTE — TELEPHONE ENCOUNTER
Reason for Call:  Form, our goal is to have forms completed with 72 hours, however, some forms may require a visit or additional information.    Type of letter, form or note:  [employer]    Who is the form from?: [employer] [facility] [employer] [facility] [employer] (if other please explain)    Where did the form come from: form was faxed in    What clinic location was the form placed at?: [facility]    Where the form was placed: Dr's Box: Flip Prakash MD    What number is listed as a contact on the form?: [employer], fax # [phone]       Additional comments: [employer] [facility] [employer] - Home Health Certification from 01- to 03-    Call taken on 2/2/2017 at 9:32 AM by JOSIAH LUIS

## 2017-02-02 NOTE — TELEPHONE ENCOUNTER
Form reviewed and signed by Dr. Flip Prakash and faxed to [facility].  [facility]  Edie Singh TC

## 2017-02-02 NOTE — CONSULTS
"NUTRITION EDUCATION    REASON FOR ASSESSMENT:  MD consult for diet education, new dx DM    NUTRITION HISTORY:  Information obtained from pt's wife.  \"He's a god Hungarian, eats meat and potatoes\". Pt normally eats 3 meals/day.      CURRENT DIET:  Mod consistent carb    NUTRITION DIAGNOSIS:  Food- and nutrition-related knowledge deficit R/t no prior exposure to the information, AEB new dx      INTERVENTIONS:    Nutrition Prescription:  Continue current diet    Implementation:    Nutrition Education (Content):  a) Provided handout  Living With Diabetes, Carbohydrate Counting  b) Discussed meal consistency and portion control     Nutrition Education (Application):  a) Discussed current eating habits and recommended alternative food choices    Anticipate good compliance    Diet Education - refer to Education Flowsheet    Goals:    Patient verbalizes understanding of diet     All of the above goals met during the education session    Follow Up/Monitoring:    Provided RD contact information for future questions.    Recommend Out-Patient Nutrition Referral, if further diet instructions are needed.    Criss Hawkins RD  Pager [phone] (M-F)            [phone] (W/E & Hol)            "

## 2017-02-02 NOTE — PLAN OF CARE
Problem: Goal Outcome Summary  Goal: Goal Outcome Summary  Outcome: Adequate for Discharge Date Met:  02/02/17  Pt oriented to self and time. Hx of stoke; expressive aphasia. Up with sba/walker. VSS on ra. Denies pain. Tolerating mod carb diet well. /168. Incontinent of b/b. To d/c home today with PT/OT/RN. Family to transport, wife and son.

## 2017-02-03 NOTE — TELEPHONE ENCOUNTER
Terrance called from home care, she is readmitting patient for home care.  Patient needs assistance with some ADLs deepti. with showering.  Requests verbal orders for HHA 1 visit a week until March 21st.    Verbal okay given, tate sent to provider.

## 2017-02-06 NOTE — NURSING NOTE
"Chief Complaint   Patient presents with     Hospital F/U       Initial /78 mmHg  Pulse 68  Temp(Src) 98  F (36.7  C) (Tympanic)  Resp 18  Ht 5' 5\" (1.651 m)  Wt 159 lb (72.122 kg)  BMI 26.46 kg/m2 Estimated body mass index is 26.46 kg/(m^2) as calculated from the following:    Height as of this encounter: 5' 5\" (1.651 m).    Weight as of this encounter: 159 lb (72.122 kg).  Medication Reconciliation: complete     Jigna Renee CMA      "

## 2017-02-06 NOTE — PROGRESS NOTES
"  SUBJECTIVE:                                                    Brian Hernandez is a 84 year old male who presents to clinic today for the following health issues:          Hospital Follow-up Visit:    Hospital/Nursing Home/ Rehab Facility: [facility]  Date of Admission: 2/2/17  Date of Discharge: 2/5/17  Reason(s) for Admission: sepsis            Problems taking medications regularly:  None       Medication changes since discharge: None       Problems adhering to non-medication therapy:  None    Summary of hospitalization:  [facility] discharge summary reviewed  Diagnostic Tests/Treatments reviewed.  Follow up needed: Care coordination  Other Healthcare Providers Involved in Patient s Care:         Homecare and Physical Therapy was involved until one of the sons took the patient to [facility] [place] to walk inside to get some exercise.  Patient has a very unsteady gait and walks with his walker.  When [facility] physical therapy showed up, they in essence fired him because he is not homebound.     Status since discharge: fluctuating course.     Post Discharge Medication Reconciliation: discharge medications reconciled, continue medications without change.  Plan of care communicated with patient and family     Coding guidelines for this visit:  Type of Medical   Decision Making Face-to-Face Visit       within 7 Days of discharge Face-to-Face Visit        within 14 days of discharge   Moderate Complexity 20633 [id number]   High Complexity 29666 57541                Problem list and histories reviewed & adjusted, as indicated.  Additional history: The patient, his wife and son who are present today are all frustrated.  They have 6 steps to walk up to the bedroom level of their house.  Brian has become weak to the point that traversing those stairs is probably getting to the dangerous point.    Problem list, Medication list, Allergies, and Medical/Social/Surgical histories reviewed in EPIC " "and updated as appropriate.    ROS:  Constitutional, neuro, ENT, endocrine, pulmonary, cardiac, gastrointestinal, genitourinary, musculoskeletal, integument and psychiatric systems are negative, except as otherwise noted.    OBJECTIVE:                                                    /78 mmHg  Pulse 68  Temp(Src) 98  F (36.7  C) (Tympanic)  Resp 18  Ht 5' 5\" (1.651 m)  Wt 159 lb (72.122 kg)  BMI 26.46 kg/m2  Body mass index is 26.46 kg/(m^2).  GENERAL APPEARANCE: healthy, alert, no distress and pale         ASSESSMENT/PLAN:                                                        ICD-10-CM    1. H/O sepsis Z86.19 CARE COORDINATION REFERRAL   2. Cerebrovascular accident (CVA), unspecified mechanism (H) I63.9 CARE COORDINATION REFERRAL   3. Generalized muscle weakness M62.81 CARE COORDINATION REFERRAL   4. Atrial fibrillation with RVR (H) I48.91    5. Malignant neoplasm of urinary bladder, unspecified site (H) C67.9 CARE COORDINATION REFERRAL   6. PVD (peripheral vascular disease) (H) I73.9    7. Anemia due to antineoplastic chemotherapy D64.81    8. Pancytopenia (H) D61.818    9. Essential hypertension with goal blood pressure less than 140/90 I10    10. CKD (chronic kidney disease) stage 3, GFR 30-59 ml/min N18.3        Patient Instructions   I had a 45 minute discussion with the patient, his wife and one of his sons today.  We discussed his recent issues with sepsis.  We also talked about his bladder cancer.  He has gotten weak enough that staying in their house is probably getting to the point of not being safe.  He has a 6 step climb to get to the bedroom level.  He does make that but not with great ease.  His chance for falling doing that are pretty large I think.  We talked about everything from assisted living to nursing home care.  They do have advanced directives done which are in the chart.  Patient's son was worried that they would be forced to give him a feeding tube if he didn't want to eat.  " "We had a discussion about advanced directives and how we have to abide by their choices that they have on those advance directives.  They are getting help from an  now with their finances.  I suggested that we get care coordination involved.  We may have to have the discussion with review home care about therapy.  Their \"firing\" of the patient for not being homebound was certainly silly.    I will discuss with Dr. Flip Porter the idea of placing the patient on prophylactic antibiotics on a once daily basis in the form of Macrodantin.  I suggested to the family that it was probably time to get out and look at other living situations.  I think taking care of the patient has become an extremely high burden for his wife and as noted before the safety of having him be in his own home is also questionable at this point.  I will also put orders to check a UA with micro-reflex to culture in the lab for next week.  Will also get his CBC at that point.  Further plan will be pending review of those tests.        Flip Prakash MD  [facility]    "

## 2017-02-07 NOTE — Clinical Note
[facility]  Complex Care Plan  About Me  Patient Name:  Brian Bingham    YOB: 1933  Age:   84 year old   Brie MRN: [id number] Telephone Information:     Home Phone [phone]   Mobile [phone]       Address:    [address]  [place] [address] Email address:  [email or web address]      Emergency Contact(s)  Name Relationship Lgl Grd Work Phone Home Phone Mobile Phone   1. MI BINGHAM Spouse  none [phone] None   2. LITTLE BINGHAM* Son  none [phone]           Primary language:  English     needed? No   [facility] [employer]:  [phone] op. 1  Other communication barriers:  (Failing health)  Preferred Method of Communication:  Phone (with spouse/Mi)  Current living arrangement: I live in a private home with spouse  Mobility Status/ Medical Equipment: Independent w/Device  Other information to know about me:    Health Maintenance  Health Maintenance Reviewed: Not assessed    My Access Plan  Medical Emergency 911   Primary Clinic Line [facility]- [phone]   24 Hour Appointment Line [phone] or  [phone] ([phone]) (toll-free)   24 Hour Nurse Line [phone] (toll-free)   Preferred Urgent Care [facility], [phone]   Preferred Hospital [facility]  [phone]   Preferred Pharmacy [facility] [address] AT [facility] [address]     Behavioral Health Crisis Line [facility], [phone] or 911     My Care Team Members  Patient Care Team       Relationship Specialty Notifications Start End    Flip Prakash MD PCP - General Family Practice  12/12/12     Phone: [phone] Fax: [phone]         [facility] [address] [facility] [address]    Shanna Mckay RN Clinic Care Coordinator  Admissions 2/7/17     Phone: [phone] Fax: [phone]              My Care Plans  Self Management and Treatment Plan  Goals and (Comments)  Goal #1: I want to safely stay in my home      10% of goal reached    Action Plans on File: None  Advance Care Plans/Directives Type:   Type Advanced Care Plans/Directives: Advanced Directive - On File, DNR/DNI    My Medical and Care Information  Problem List   Patient Active Problem List   Diagnosis     iamENTHESOPATHY OF HIP     Intermittent asthma     Hyperlipidemia LDL goal <70     Chronic constipation     Rhinorrhea     Infected sebaceous cyst     Cerebral infarction (H)     Occlusion and stenosis of carotid artery with cerebral infarction     Stroke (H)     Constipation     Late effects of CVA (cerebrovascular accident)     Advance Care Planning     Atrial fibrillation with RVR (H)     Syncope and collapse     Coronary artery disease involving native coronary artery of native heart without angina pectoris     Bladder cancer (H)     PVD (peripheral vascular disease) (H)     Urine frequency     Closed right hip fracture (H)     Pulmonary hypertension (H)     Snores     Pulmonary nodules     Anemia     Fall     Closed right hip fracture, with routine healing, subsequent encounter     S/P ORIF (open reduction internal fixation) fracture     Paroxysmal a-fib (H)     Anemia due to blood loss, acute     Malignant neoplasm of urinary bladder, unspecified site (H)     Constipation, unspecified constipation type     Pancytopenia (H)     Adjustment disorder with depressed mood     Essential hypertension with goal blood pressure less than 140/90     CKD (chronic kidney disease) stage 3, GFR 30-59 ml/min     Thrombocytopenia (H)     Gross hematuria     Generalized muscle weakness     Bladder tumor     UTI (urinary tract infection)     Urinary tract infection     Anemia in other chronic diseases classified elsewhere     Fever, unspecified     Elevated temperature     Sepsis (H)      Current Medications and Allergies:  See printed Medication  Report.    Care Coordination Start Date: 02/07/17   Frequency of Care Coordination: Monthly   Form Last Updated: 02/07/2017

## 2017-02-07 NOTE — PROGRESS NOTES
"Clinic Care Coordination Contact  OUTREACH    Referral Information:  Referral Source: PCP  Reason for Contact: Patient was seen in PCP clinic with spouse and son. PCP has concerns regarding safety in the home.   Care Conference: No     Universal Utilization:   ED Visits in last year: 1  Hospital visits in last year: 4  Last PCP appointment: 02/06/17  Missed Appointments: 4  Concerns: none  Multiple Providers or Specialists: Yes (Urology)    Clinical Concerns:    Current Medical Concerns: Per PCP patient had home care services, but the son took patient out of the home for an hour and home care cancelled services because they stated he was no longer \"home bound.\"  Home care agency did not inform patient of this until they inquired why no one was coming to see him.     Patient requires a walker, is not able to get out of the home without the assist of at least one other person. He is dependent with all ADL's  And his spouse states he requires frequent changing as he is incontinent.  Clinic care coordinator spoke with spouse, Mi. She is patient's primary caregiver. She states he is very dependent on her for all his needs. She states the best quality of life for patient is to have peace and quiet. She states he sleeps most of the time.  Mi does not want home care in the home, she felt they were more disruptive than beneficial.  She does not want PT for strengthening because she states all they do is teach her the exercises and expect her to do them with the patient. She states the patient has no motivation to attempt to do exercises and she is not able to help him do them.      Patient states she does not want to move. She feels that would create \"too much upheaval for both of them.\"      We discussed at length getting help for her with housekeeping and possibly help during the night for patient as spouse is getting up several times a night to change him.  Spouse agreed to discuss some of the options with her sons " "and to call clinic care coordinator with future questions.            Current Behavioral Concerns: Denies concerns. Spouse states \" if I am having a bad day, can I just call you to talk?\"  Assured spouse she could.       Education Provided to patient: Resources for in the home and out of the home.      Clinical Pathway Name: None      Medication Management:  Spouse manages all medications.      Functional Status:  Mobility Status: Independent w/Device  Equipment Currently Used at Home: walker, rolling (tub bench)  Transportation: Family provides      Psychosocial:  Current living arrangement:: I live in a private home with spouse. Patient has six stairs to get up and down from bedroom. He requires assistance from spouse to manage the steps. This is a safety concern, but spouse states they can manage at this time.        Resources and Interventions:  Current Resources:  None. Refuses the resumption of home care.      Advanced Care Plans/Directives on file:: Yes        Goals:   Goal 1 Statement: I want to safely stay in my home  Goal 1 Progression Percent: 10%  Goal 1 Progression Date: 02/07/17      Barriers: Failing health  Strengths: Family support    Patient/Caregiver understanding: Caregiver expresses understanding    Frequency of Care Coordination: Monthly        Plan: Clinic care coordinator will continue to follow and be available for spouse as needed.     Shanna Mckay RN, CCM - Care Coordinator     2/7/2017    4:11 PM  [phone]   "

## 2017-02-07 NOTE — PATIENT INSTRUCTIONS
"I had a 45 minute discussion with the patient, his wife and one of his sons today.  We discussed his recent issues with sepsis.  We also talked about his bladder cancer.  He has gotten weak enough that staying in their house is probably getting to the point of not being safe.  He has a 6 step climb to get to the bedroom level.  He does make that but not with great ease.  His chance for falling doing that are pretty large I think.  We talked about everything from assisted living to nursing home care.  They do have advanced directives done which are in the chart.  Patient's son was worried that they would be forced to give him a feeding tube if he didn't want to eat.  We had a discussion about advanced directives and how we have to abide by their choices that they have on those advance directives.  They are getting help from an  now with their finances.  I suggested that we get care coordination involved.  We may have to have the discussion with review home care about therapy.  Their \"firing\" of the patient for not being homebound was certainly silly.    I will discuss with Dr. Flip Porter the idea of placing the patient on prophylactic antibiotics on a once daily basis in the form of Macrodantin.  I suggested to the family that it was probably time to get out and look at other living situations.  I think taking care of the patient has become an extremely high burden for his wife and as noted before the safety of having him be in his own home is also questionable at this point.  I will also put orders to check a UA with micro-reflex to culture in the lab for next week.  Will also get his CBC at that point.  Further plan will be pending review of those tests.  "

## 2017-02-08 NOTE — TELEPHONE ENCOUNTER
Reason for Call:  Form, our goal is to have forms completed with 72 hours, however, some forms may require a visit or additional information.    Type of letter, form or note:  [employer] [place]    Who is the form from?: [employer] (if other please explain)    Where did the form come from: form was faxed in    What clinic location was the form placed at?: [facility]    Where the form was placed: Dr's Box: Flip Prakash MD    What number is listed as a contact on the form?: [employer], fax # [phone]       Additional comments: Signature on Physician's Orders    Call taken on 2/8/2017 at 8:34 AM by JOSIAH LUIS

## 2017-02-14 NOTE — PROGRESS NOTES
"  SUBJECTIVE:                                                    Brian Hernandez is a 84 year old male who presents to clinic today for the following health issues:      Abdominal Pain      Duration: 1-2 weeks- worsened today    Description (location/character/radiation): LLQ       Associated flank pain: None    Intensity:  severe    Accompanying signs and symptoms:        Fever/Chills: YES- CHILLS       Gas/Bloating: YES       Nausea/vomitting: no        Diarrhea: no        Dysuria or Hematuria: no     History (previous similar pain/trauma/previous testing):     Precipitating or alleviating factors:       Pain worse with eating/BM/urination: No       Pain relieved by BM: no     Therapies tried and outcome: miralax, senokot    LMP:  not applicable       Genitourinary symptoms      Duration: months    Description:  incontinence    Intensity:  severe    Accompanying signs and symptoms (fever/discharge/nausea/vomiting/back or abdominal pain):  None    History (frequent UTI's/kidney stones/prostate problems): yes, UTI's  Sexually active: no     Precipitating or alleviating factors: bladder cancer and its treatment    Therapies tried and outcome: course of antibiotics -    Outcome: helped a little       Problem list and histories reviewed & adjusted, as indicated.  Additional history: as documented    Problem list, Medication list, Allergies, and Medical/Social/Surgical histories reviewed in [facility] and updated as appropriate.    ROS:  Constitutional, HEENT, cardiovascular, pulmonary, gi and gu systems are negative, except as otherwise noted.    OBJECTIVE:                                                    /66  Pulse 66  Temp 97.6  F (36.4  C) (Tympanic)  Resp 14  Ht 5' 6\" (1.676 m)  Wt 156 lb (70.8 kg)  BMI 25.18 kg/m2  Body mass index is 25.18 kg/(m^2).  GENERAL APPEARANCE: healthy, alert and no distress  ABDOMEN: soft, nontender, without hepatosplenomegaly or masses and bowel sounds normal         ASSESSMENT/PLAN: "                                                        ICD-10-CM    1. Urine frequency R35.0 UA with Microscopic reflex to Culture   2. Chronic constipation K59.09    3. Malignant neoplasm of urinary bladder, unspecified site (H) C67.9        Patient Instructions   I had a discussion with the patient, his wife and his son about their current living situation.  Mi is complaining about how much work taking care of her  is.  However we started discussing different living situations such as moving to an assisted living spot she doesn't want to move at all right now.  We also talked about having home care come in to help and she doesn't want that either.  I think her son is in agreement that something probably needs to happen.  We did discuss the possibility of using condom catheters at night or having them contact Dr. Porter's office to discuss other possibilities to deal with his incontinence.  We will check his urine today.  The question of prophylactic antibiotics used on a daily basis also was discussed.  A total of 25 minutes was spent with the family discussing all of this.    With respect to his chronic constipation I suggested that she stop the Senokot at this point.  She will start using MiraLAX 1 dose daily.  We can adjust either up or down from that depending on how his constipation responds.      Flip Prakash MD  [facility]

## 2017-02-14 NOTE — MR AVS SNAPSHOT
After Visit Summary   2/14/2017    Brian Hernandez    MRN: [id number]           Patient Information     Date Of Birth          2/6/1933        Visit Information        Provider Department      2/14/2017 1:30 PM Flip Prakash MD [facility]        Today's Diagnoses     Urine frequency    -  1    Chronic constipation        Malignant neoplasm of urinary bladder, unspecified site (H)          Care Instructions    I had a discussion with the patient, his wife and his son about their current living situation.  Mi is complaining about how much work taking care of her  is.  However we started discussing different living situations such as moving to an assisted living spot she doesn't want to move at all right now.  We also talked about having home care come in to help and she doesn't want that either.  I think her son is in agreement that something probably needs to happen.  We did discuss the possibility of using condom catheters at night or having them contact Dr. Porter's office to discuss other possibilities to deal with his incontinence.  We will check his urine today.  The question of prophylactic antibiotics used on a daily basis also was discussed.  A total of 25 minutes was spent with the family discussing all of this.    With respect to his chronic constipation I suggested that she stop the Senokot at this point.  She will start using MiraLAX 1 dose daily.  We can adjust either up or down from that depending on how his constipation responds.        Follow-ups after your visit        Your next 10 appointments already scheduled     Mar 24, 2017  2:00 PM CDT   Cystoscopy with Flip Porter MD,  CYF   [facility] ([facility])    [address]  [address]  [facility] [address] [phone]              Who to contact     If you have questions or need follow up information about today's  "clinic visit or your schedule please contact [facility] directly at [phone].  Normal or non-critical lab and imaging results will be communicated to you by MyChart, letter or phone within 4 business days after the clinic has received the results. If you do not hear from us within 7 days, please contact the clinic through [employer] or phone. If you have a critical or abnormal lab result, we will notify you by phone as soon as possible.  Submit refill requests through [employer] or call your pharmacy and they will forward the refill request to us. Please allow 3 business days for your refill to be completed.          Additional Information About Your Visit        [employer] Information     [employer] gives you secure access to your electronic health record. If you see a primary care provider, you can also send messages to your care team and make appointments. If you have questions, please call your primary care clinic.  If you do not have a primary care provider, please call [phone] and they will assist you.        Care EveryWhere ID     This is your Care EveryWhere ID. This could be used by other organizations to access your [place] medical records  [id number]        Your Vitals Were     Pulse Temperature Respirations Height BMI (Body Mass Index)       66 97.6  F (36.4  C) (Tympanic) 14 5' 6\" (1.676 m) 25.18 kg/m2        Blood Pressure from Last 3 Encounters:   02/14/17 126/66   02/06/17 124/78   02/02/17 154/81    Weight from Last 3 Encounters:   02/14/17 156 lb (70.8 kg)   02/06/17 159 lb (72.1 kg)   01/30/17 153 lb 6.4 oz (69.6 kg)              We Performed the Following     UA with Microscopic reflex to Culture     Urine Culture Aerobic Bacterial        Primary Care Provider Office Phone # Fax #    Flip Prakash [phone]       [facility] [address] [facility] [address]        Thank you!     Thank you for choosing [facility] " [facility][1][facility]  for your care. Our goal is always to provide you with excellent care. Hearing back from our patients is one way we can continue to improve our services. Please take a few minutes to complete the written survey that you may receive in the mail after your visit with us. Thank you!             Your Updated Medication List - Protect others around you: Learn how to safely use, store and throw away your medicines at www.disposemymeds.org.          This list is accurate as of: 2/14/17  4:42 PM.  Always use your most recent med list.                   Brand Name Dispense Instructions for use    ACETAMINOPHEN PO      Take 650 mg by mouth every 4 hours as needed for pain       blood glucose calibration solution    no brand specified    1 each    Use to calibrate blood glucose monitor as directed.       blood glucose lancets standard    no brand specified    1 Box    Use to test blood sugar 3 times daily or as directed.       blood glucose monitoring meter device kit    no brand specified    1 kit    Use to test blood sugars 3 times daily or as directed       blood glucose monitoring test strip    no brand specified    100 strip    Use to test blood sugars 3 times daily or as directed       CENTRUM SILVER per tablet     30 tablet    Take 1 tablet by mouth daily       citalopram 10 MG tablet    celeXA    30 tablet    Take 1 tablet (10 mg) by mouth daily       ferrous sulfate 325 (65 FE) MG tablet    IRON     Take 325 mg by mouth 2 times daily       folic acid 1 MG tablet    FOLVITE    30 tablet    Take 1 tablet (1 mg) by mouth daily       labetalol 300 MG tablet    NORMODYNE    180 tablet    Take 1 tablet (300 mg) by mouth 2 times daily       LIPITOR PO      Take 20 mg by mouth every evening       polyethylene glycol powder    MIRALAX/GLYCOLAX     Take 17 g by mouth daily as needed       sennosides 8.6 MG tablet    SENOKOT    120 each    Take 2 tablets by mouth 2 times daily as needed for constipation        VITAMIN D (CHOLECALCIFEROL) PO      Take 2,000 Units by mouth daily

## 2017-02-14 NOTE — PATIENT INSTRUCTIONS
I had a discussion with the patient, his wife and his son about their current living situation.  Mi is complaining about how much work taking care of her  is.  However we started discussing different living situations such as moving to an assisted living spot she doesn't want to move at all right now.  We also talked about having home care come in to help and she doesn't want that either.  I think her son is in agreement that something probably needs to happen.  We did discuss the possibility of using condom catheters at night or having them contact Dr. Porter's office to discuss other possibilities to deal with his incontinence.  We will check his urine today.  The question of prophylactic antibiotics used on a daily basis also was discussed.  A total of 25 minutes was spent with the family discussing all of this.    With respect to his chronic constipation I suggested that she stop the Senokot at this point.  She will start using MiraLAX 1 dose daily.  We can adjust either up or down from that depending on how his constipation responds.

## 2017-02-15 NOTE — TELEPHONE ENCOUNTER
Reason for Call:  Form, our goal is to have forms completed with 72 hours, however, some forms may require a visit or additional information.    Type of letter, form or note:  [facility]    Who is the form from?: [facility] (if other please explain)    Where did the form come from: form was faxed in    What clinic location was the form placed at?: [facility]    Where the form was placed: Dr's Box: Flip Prakash MD    What number is listed as a contact on the form?: [facility] fax # [phone]       Additional comments: Signature on Skilled Nursing Discharge/Order Summary    Call taken on 2/15/2017 at 9:28 AM by JOSIAH LUIS

## 2017-02-15 NOTE — TELEPHONE ENCOUNTER
Forms reviewed and signed by Dr. Flip Prakash and faxed to [facility].  Form routed to [facility] to be scanned.  Edie Singh TC

## 2017-03-01 NOTE — TELEPHONE ENCOUNTER
Reason for Call:  Form, our goal is to have forms completed with 72 hours, however, some forms may require a visit or additional information.    Type of letter, form or note:  medical    Who is the form from?: Home care    Where did the form come from: form was faxed in    What clinic location was the form placed at?: [facility]    Where the form was placed: 's Box: Flip Prakash MD    What number is listed as a contact on the form?: [phone]       Additional comments:     Call taken on 3/1/2017 at 10:24 AM by Kenyatta Eaton

## 2017-03-03 NOTE — TELEPHONE ENCOUNTER
Reason for Call:  Form, our goal is to have forms completed with 72 hours, however, some forms may require a visit or additional information.    Type of letter, form or note:  medical    Who is the form from?: Home care    Where did the form come from: form was faxed in    What clinic location was the form placed at?: [facility]    Where the form was placed: 's Box: Flip Prakash MD    What number is listed as a contact on the form?: Fax # [phone]       Additional comments: Resumption of Care (Order Date: 2/3/17)    Call taken on 3/3/2017 at 4:57 PM by Mazin Mejia

## 2017-03-06 NOTE — TELEPHONE ENCOUNTER
Lilia paula [facility] in [place] is calling for:      Faxing patient orders over to be signed by provider in order to admit patient to assisted living center. Verified fax number  Patient had a chest xray 1/30/17 for pneumonia and it can count for a TB screen.

## 2017-03-06 NOTE — TELEPHONE ENCOUNTER
Reason for Call:  Form, our goal is to have forms completed with 72 hours, however, some forms may require a visit or additional information.    Type of letter, form or note:  medical    Who is the form from?: [facility]    Where did the form come from: form was faxed in    What clinic location was the form placed at?: [facility]    Where the form was placed: 's Box: Flip Prakash MD    What number is listed as a contact on the form?: [phone]       Additional comments:     Call taken on 3/6/2017 at 11:19 AM by Kenyatta Eaton

## 2017-03-07 NOTE — TELEPHONE ENCOUNTER
Tena from [facility] in [place] called, patient is hoping to move in tomorrow.  Tena requested to have a covering provider fill the form out and fax asap.   Huddled with Bibi ACEVEDO, who is working on the form.

## 2017-03-07 NOTE — PROGRESS NOTES
Clinic Care Coordination Contact  Care Team Conversations    Per chart review previous to follow up call, it was noted that a request for orders for assisted living had been received. [facility] ([phone])    Clinic care coordinator called and spoke with Lilia. She states they have received all the paper work and orders they need. They plan to admit patient tomorrow, 3/8/17.  The plan is for two weeks of respite care as patient's spouses health is failing. Lilia states the son has indicated they are hoping to make the move a permanent one.      Clinic care coordinator will follow up with Lilia in two weeks to determine decision by family.    Shanna Mckay RN, CCM - Care Coordinator     3/7/2017    2:07 PM  [phone]

## 2017-03-08 NOTE — PROGRESS NOTES
Clinic Care Coordination Contact  Care Team Conversations    Clinic care coordinator received call from Ginger, nurse at [facility]. ([phone]) Patient is being admitted today for respite care for two weeks. Ginger states when she spoke to spouse earlier this week, the spouse had indicted they may be interested in Hospice care.      After discussion it was decided [facility] staff will assess patient's status during the next two weeks. (patient is not admitted yet) If they feel he is appropriate for hospice they will discuss with family and request an order from PCP.  Ginger will be in contact with clinic care coordinator with status.    Clinic care coordinator will continue to follow.     Shanna Mckay RN, CCM - Care Coordinator     3/8/2017    11:25 AM  [phone]

## 2017-03-08 NOTE — TELEPHONE ENCOUNTER
Reason for Call:  Form, our goal is to have forms completed with 72 hours, however, some forms may require a visit or additional information.    Type of letter, form or note:  medical    Who is the form from?: [facility]    Where did the form come from: form was faxed in    What clinic location was the form placed at?: [facility]    Where the form was placed: Dr's Box: Flip Prakash MD    What number is listed as a contact on the form?: Fax # [phone]       Additional comments: POLST    Call taken on 3/8/2017 at 9:12 AM by Mazin Mejia

## 2017-03-08 NOTE — TELEPHONE ENCOUNTER
Form reviewed and signed by KRISTINA Martinez and faxed to [facility].  Edie Singh TC

## 2017-03-08 NOTE — TELEPHONE ENCOUNTER
Reason for Call:  Form, our goal is to have forms completed with 72 hours, however, some forms may require a visit or additional information.    Type of letter, form or note:  [facility]    Who is the form from?: [place] [facility] [place] (if other please explain)    Where did the form come from: form was faxed in    What clinic location was the form placed at?: [facility]    Where the form was placed: 's Box: KRISTINA Martinez    What number is listed as a contact on the form?: [facility], fax # [phone]       Additional comments: Request for approval of order to discontinue blood sugar checks as patient does not check this at home.    Call taken on 3/8/2017 at 1:47 PM by JOSIAH LUIS

## 2017-03-09 NOTE — TELEPHONE ENCOUNTER
Reason for Call:  Form, our goal is to have forms completed with 72 hours, however, some forms may require a visit or additional information.    Type of letter, form or note:  [facility]    Who is the form from?: [place] [facility] [place] (if other please explain)    Where did the form come from: form was faxed in    What clinic location was the form placed at?: [facility]    Where the form was placed: 's Box: KRISTINA Martinez    What number is listed as a contact on the form?: [facility], fax # [phone]       Additional comments: Request for order for cranberry tablet, 2 pills, q a.m.    Call taken on 3/9/2017 at 11:55 AM by JOSIAH LUIS

## 2017-03-09 NOTE — TELEPHONE ENCOUNTER
Form reviewed and signed by KRISTINA Martinez and faxed to [facility].  Edie Singh TC

## 2017-03-15 NOTE — TELEPHONE ENCOUNTER
Clinic Action Needed:Yes  FNA Triage Call  Presenting Problem: iPt this afternoon had a black stool.  He is in an assisted living arrangement and he is up and about and probably happened about 10 minutes ago.    Paged on call MD to see if they would order a guaiac test.  Pt is alert and oriented, he is not pale his B/P right now is 153/83.  Paged Dr. Bledsoe who approved guaiac test, called assisted living back and gave them the order.    Guideline Used: order put in chart    Routed to: [id number]  Laly Mart MA RN, [facility]

## 2017-03-15 NOTE — TELEPHONE ENCOUNTER
Call Type: Triage Call    Presenting Problem: Pt this afternoon had a black stool.  He is in an  assisted living arrangement and he is up and about and probably  happened about 10 minutes ago.    Paged on call MD to see if they  would order a guaiac test.  Pt is alert and oriented, he is not pale  his B/P right now is 153/83.  Test ordered and put in [facility], assisted  living called and told that order was placed.  Paged Dr. SINA Bledsoe  via [facility] at 5:15 p.m. who did approve order.  Triage Note:  Guideline Title: No Guideline - Advice Per Reference (Adult)  Recommended Disposition: Call Provider Immediately  Original Inclination: Would have called clinic  Override Disposition:  Intended Action: Call PCP/HCP  Physician Contacted: No  CALL PROVIDER IMMEDIATELY ?  YES  SEE ED IMMEDIATELY ? NO  ACTIVATE  ? NO  Physician Instructions:  Care Advice:

## 2017-03-22 NOTE — TELEPHONE ENCOUNTER
Reason for Call:  Form, our goal is to have forms completed with 72 hours, however, some forms may require a visit or additional information.    Type of letter, form or note:  [facility]    Who is the form from?: [place] [facility] [place] (if other please explain)    Where did the form come from: form was faxed in    What clinic location was the form placed at?: [facility]    Where the form was placed: 's Box: Flip Prkaash MD    What number is listed as a contact on the form?: [facility], fax # [phone]       Additional comments: Form regarding possible change in dose of Citalopram    Call taken on 3/22/2017 at 8:46 AM by JOSIAH LUIS

## 2017-03-22 NOTE — TELEPHONE ENCOUNTER
Form reviewed, completed with new order, and signed by Dr. Flip Prakash and faxed to [facility].  Edie Singh TC

## 2017-03-23 NOTE — PROGRESS NOTES
Clinic Care Coordination Contact  Care Team Conversations    Clinic care coordinator called [facility] to check patient status. Patient was admitted two weeks ago for respite care.     Clinic care coordinator spoke with nurse, Rodriguez. He states there is no indication regarding patient staying on long term. They also have no indication of patient being discharged. Rodriguez states he will discuss with Ginger and ask her to notify clinic care coordinator with family decisions.    Rodriguez states that patient's spouse comes daily to share a meal with him.     Clinic care coordinator will await call from respite and will continue to follow.     Shanna Mckay RN, [facility] - Care Coordinator     3/23/2017    1:48 PM  [phone]

## 2017-03-24 NOTE — PROGRESS NOTES
Clinic Care Coordination Contact  Care Team Conversations    Ginger from [facility] call clinic care coordinator. She states that patient is doing very well with them and they are happy to have him. Relljailyn states that the family has extended his stay for another two weeks. Right now he is scheduled to be with them until  4/4/17 when they will evaluate again.     Ginger will keep clinic care coordinator informed.     Shanna Mckay RN, CCM - Care Coordinator     3/24/2017    2:07 PM  [phone]

## 2017-03-31 NOTE — PROGRESS NOTES
Clinic Care Coordination Contact  Care Team Conversations    Clinic care coordinator received a call from Ginger nurse at [facility]. Patient has been with them for respite care for a month. Spouse has decided she wants to take him back home but would like hospice care. Family would like [facility]. They would like to meet with the hospice staff at [facility] and have everything ready in the home at the time of discharge Patient plans to discharge home on 4/4/17. Ginger is requesting an order for hospice.     She states patient would be appropriate for this level of care. She states patient is lethargic, sleeps a lot during the day. He has bladder cancer which he is receiving no treatment for any longer.  Ginger states spouse also asked about a room for both of them to be there.  They do not currently have a room available but that can always change. Ginger is not sure spouse is really interested in moving in as well.     Clinic care coordinator discussed order for hospice with physician. Approved. Sent to Ginger at [facility].    Shanna Mckay RN, CCM - Care Coordinator     3/31/2017    7:54 AM  [phone]

## 2017-04-02 PROBLEM — I50.9 CHF (CONGESTIVE HEART FAILURE) (H): Status: ACTIVE | Noted: 2017-01-01

## 2017-04-02 NOTE — IP AVS SNAPSHOT
[facility]    [address] [place] [address]., [address]    [place] [address]    Phone:  [phone]                                       After Visit Summary   4/2/2017    Brian Hernandez    MRN: [id number]           After Visit Summary Signature Page     I have received my discharge instructions, and my questions have been answered. I have discussed any challenges I see with this plan with the nurse or doctor.    ..........................................................................................................................................  Patient/Patient Representative Signature      ..........................................................................................................................................  Patient Representative Print Name and Relationship to Patient    ..................................................               ................................................  Date                                            Time    ..........................................................................................................................................  Reviewed by Signature/Title    ...................................................              ..............................................  Date                                                            Time

## 2017-04-02 NOTE — IP AVS SNAPSHOT
MRN:[id number]                      After Visit Summary   4/2/2017    Brian Hernandez    MRN: [id number]           Thank you!     Thank you for choosing [facility] for your care. Our goal is always to provide you with excellent care. Hearing back from our patients is one way we can continue to improve our services. Please take a few minutes to complete the written survey that you may receive in the mail after you visit with us. Thank you!        Patient Information     Date Of Birth          2/6/1933        About your hospital stay     You were admitted on:  April 2, 2017 You last received care in the:  [facility]    You were discharged on:  April 6, 2017       Who to Call     For medical emergencies, please call 911.  For non-urgent questions about your medical care, please call your primary care provider or clinic, [phone]          Attending Provider     Provider Specialty    Dipika Yarbrough MD Emergency Medicine    [facility], Tommy Keene MD Internal Medicine       Primary Care Provider Office Phone # Fax #    Flip Prakash [phone]       [facility] [address] [address] [place] [address]        After Care Instructions     Activity       Your activity upon discharge: activity as tolerated            Diet       Follow this diet upon discharge: Orders Placed This Encounter      Room Service      Regular Diet Adult                  General Recommendations To Control Heart Failure When You Get Home     Instructions To Patients and Families: Please read and check off each of these important instructions as you do them when you get home.           Weight and symptoms      ___ Put a scale in your bathroom  ___ Post a weight chart or calendar next to the scale  ___Weigh yourself every day as soon as you you get up in the morning. You should only be wearing your pajamas. Write your weight on the chart/calendar.  ___ Bring  "your weight chart/calendar with you to all appointments    ___Call your doctor if you gain 2 pounds in 1 day or 5 pounds in 1 week from your \"dry\" weight (baseline weight). Also call your doctor if you have shortness of breath that gets worse over time, leg swelling or fatigue.         Medicines and diet     ___ Make sure to take your medicines as prescribed.    ___Bring a current list of your medicines and all of your medicine bottles with you to all appointments.    ___ Limit fluids if you still have swelling or shortness of breath, or if your doctor tells you to do so.  ___ Eat less than 2000 mg of sodium (salt) every day. Read food labels, and do not add salt to meals.   ___ Heart healthy diet with low fat and low cholesterol          Activity and suggested lifestyle changes    ___ Stay active. Talk to your doctor about an exercise program that is safe for your heart.    ___ Stop smoking. Reduce alcohol use.      ___ Lose weight if you are overweight. Extra weight puts a lot of stress on the heart.          Control for Leg Swelling   ___ Keep your legs elevated (raised) as needed for swelling. If swelling is uncomfortable or elevation doesn t help, ask your doctor about using ACE wrap or Jobst stockings.          Follow-up appointments   ___ Make a [facility] appointment with a basic metabolic panel lab draw 3 to 5 days after you leave the hospital. Call one of the following locations:   [facility]  [phone],  [facility] [phone],  [facility]  [phone].     ___ Make sure to take your medications as prescribed and bring an accurate list of your medications and your weight chart/calendar to your follow up appointment at the [facility] for continued education and adjustments          What is the [facility] clinic?    The [facility].R[facility] (Cardiomyopathy, Optimization, Rehabilitation, Education) Clinic is a heart " failure specialty clinic within the [facility]. At [facility] you will work with nurse practitioners to carefully adjust medicines, get education and learn who and when to call if symptoms appear. [facility] nurses specialize in helping you:    better understand your disease.    slow the progress of your disease.    improve the length and quality of your life.    detect future heart problems before they become life threatening.    avoid hospital stays.            Pending Results     No orders found from 3/31/2017 to 4/3/2017.            Statement of Approval     Ordered          04/06/17 0918  I have reviewed and agree with all the recommendations and orders detailed in this document.  EFFECTIVE NOW     Approved and electronically signed by:  Moriah Long MD             Admission Information     Date & Time Provider Department Dept. Phone    4/2/2017 Tommy Balderas MD [facility] [phone]      Your Vitals Were     Blood Pressure Pulse Temperature Respirations Weight Pulse Oximetry    151/95 (BP Location: Left arm) 63 99.2  F (37.3  C) (Oral) 18 67.7 kg (149 lb 4 oz) 91%    BMI (Body Mass Index)                   24.09 kg/m2           MyChart Information     [employer] gives you secure access to your electronic health record. If you see a primary care provider, you can also send messages to your care team and make appointments. If you have questions, please call your primary care clinic.  If you do not have a primary care provider, please call [phone] and they will assist you.        Care EveryWhere ID     This is your Care EveryWhere ID. This could be used by other organizations to access your [facility] medical records  [id number]           Review of your medicines      START taking        Dose / Directions    atropine 1 % ophthalmic solution   Used for:  Hospice care patient        Dose:  2 drop   Take 2 drops by mouth, place  under tongue or place inside cheek every 2 hours as needed for other (terminal respiratory secretions) Not for ophthalmic use.   Quantity:  5 mL   Refills:  1       bisacodyl 10 MG Suppository   Commonly known as:  DULCOLAX   Used for:  Hospice care patient        Unwrap and insert 1 suppository rectally twice daily as needed for constipation.   Quantity:  12 suppository   Refills:  1       carvedilol 12.5 MG tablet   Commonly known as:  COREG   Used for:  Acute congestive heart failure, unspecified congestive heart failure type (H)        Dose:  12.5 mg   Take 1 tablet (12.5 mg) by mouth 2 times daily (with meals)   Quantity:  60 tablet   Refills:  0       LORazepam 0.5 MG tablet   Commonly known as:  ATIVAN   Used for:  Hospice care patient        Dose:  0.5 mg   Take 1 tablet (0.5 mg) by mouth, place under tongue or insert rectally every 4 hours as needed for anxiety (restlessness)   Quantity:  30 tablet   Refills:  1       MEDICATION INSTRUCTION   Used for:  Hospice care patient        If care facility cannot accept or use ranges, facility is instructed to use lower end of dosing range   Refills:  0       MEDICATION INSTRUCTION   Used for:  Hospice care patient        If care facility cannot accept or use ranges, facility is instructed to use lower end of dosing range   Refills:  0       morphine 2.5 MG solu-tab   Used for:  Hospice care patient        Dose:  2.5 mg   Place 1 tablet (2.5 mg) under the tongue every 2 hours as needed for moderate to severe pain (and/or shortness of breath.)   Quantity:  30 tablet   Refills:  0         CONTINUE these medicines which may have CHANGED, or have new prescriptions. If we are uncertain of the size of tablets/capsules you have at home, strength may be listed as something that might have changed.        Dose / Directions    * ACETAMINOPHEN PO   This may have changed:  Another medication with the same name was added. Make sure you understand how and when to take each.         Dose:  650 mg   Take 650 mg by mouth every 4 hours as needed for pain   Refills:  0       * acetaminophen 650 MG Suppository   Commonly known as:  TYLENOL   This may have changed:  You were already taking a medication with the same name, and this prescription was added. Make sure you understand how and when to take each.   Used for:  Hospice care patient        Dose:  650 mg   Place 1 suppository (650 mg) rectally every 4 hours as needed for fever or mild pain (Do not exceed 4000 mg total acetaminophen per day.) Unwrap prior to insertion.   Quantity:  12 suppository   Refills:  1       * Notice:  This list has 2 medication(s) that are the same as other medications prescribed for you. Read the directions carefully, and ask your doctor or other care provider to review them with you.      CONTINUE these medicines which have NOT CHANGED        Dose / Directions    blood glucose calibration solution   Commonly known as:  no brand specified   Used for:  Type 2 diabetes mellitus with hyperosmolarity without coma, without long-term current use of insulin (H)        Use to calibrate blood glucose monitor as directed.   Quantity:  1 each   Refills:  0       blood glucose lancets standard   Commonly known as:  no brand specified   Used for:  Type 2 diabetes mellitus with hyperosmolarity without coma, without long-term current use of insulin (H)        Use to test blood sugar 3 times daily or as directed.   Quantity:  1 Box   Refills:  0       blood glucose monitoring meter device kit   Commonly known as:  no brand specified   Used for:  Type 2 diabetes mellitus with hyperosmolarity without coma, without long-term current use of insulin (H)        Use to test blood sugars 3 times daily or as directed   Quantity:  1 kit   Refills:  0       blood glucose monitoring test strip   Commonly known as:  no brand specified   Used for:  Type 2 diabetes mellitus with hyperosmolarity without coma, without long-term current use of  insulin (H)        Use to test blood sugars 3 times daily or as directed   Quantity:  100 strip   Refills:  0       CENTRUM SILVER per tablet        Dose:  1 tablet   Take 1 tablet by mouth daily   Quantity:  30 tablet   Refills:  0       CITALOPRAM HYDROBROMIDE PO        Dose:  20 mg   Take 20 mg by mouth daily   Refills:  0       Cranberry 405 MG Caps        Dose:  810 mg   Take 810 mg by mouth daily (2 x 405 mg capsules = 810 mg dose)   Refills:  0       ferrous sulfate 325 (65 FE) MG tablet   Commonly known as:  IRON        Dose:  325 mg   Take 325 mg by mouth 2 times daily   Refills:  0       folic acid 1 MG tablet   Commonly known as:  FOLVITE   Used for:  Iron deficiency anemia due to chronic blood loss        Dose:  1 mg   Take 1 tablet (1 mg) by mouth daily   Quantity:  30 tablet   Refills:  0       polyethylene glycol powder   Commonly known as:  MIRALAX/GLYCOLAX        Dose:  17 g   Take 17 g by mouth daily as needed   Refills:  0       sennosides 8.6 MG tablet   Commonly known as:  SENOKOT   Used for:  Constipation, unspecified constipation type        Dose:  2 tablet   Take 2 tablets by mouth 2 times daily as needed for constipation   Quantity:  120 each   Refills:  0       VITAMIN D (CHOLECALCIFEROL) PO        Dose:  2000 Units   Take 2,000 Units by mouth daily   Refills:  0         STOP taking     labetalol 300 MG tablet   Commonly known as:  NORMODYNE           LIPITOR PO                Where to get your medicines      These medications were sent to [facility] - [address]  [address] [facility] [address], [place] [address]     Phone:  [phone]     carvedilol 12.5 MG tablet         Some of these will need a paper prescription and others can be bought over the counter. Ask your nurse if you have questions.     Bring a paper prescription for each of these medications     acetaminophen 650 MG Suppository    atropine 1 % ophthalmic solution    bisacodyl 10 MG Suppository     LORazepam 0.5 MG tablet    morphine 2.5 MG solu-tab       You don't need a prescription for these medications     MEDICATION INSTRUCTION    MEDICATION INSTRUCTION                Protect others around you: Learn how to safely use, store and throw away your medicines at www.disposemymeds.org.             Medication List: This is a list of all your medications and when to take them. Check marks below indicate your daily home schedule. Keep this list as a reference.      Medications           Morning Afternoon Evening Bedtime As Needed    * ACETAMINOPHEN PO   Take 650 mg by mouth every 4 hours as needed for pain                                   * acetaminophen 650 MG Suppository   Commonly known as:  TYLENOL   Place 1 suppository (650 mg) rectally every 4 hours as needed for fever or mild pain (Do not exceed 4000 mg total acetaminophen per day.) Unwrap prior to insertion.                                   atropine 1 % ophthalmic solution   Take 2 drops by mouth, place under tongue or place inside cheek every 2 hours as needed for other (terminal respiratory secretions) Not for ophthalmic use.                                   bisacodyl 10 MG Suppository   Commonly known as:  DULCOLAX   Unwrap and insert 1 suppository rectally twice daily as needed for constipation.   Last time this was given:  10 mg on 4/6/2017 10:24 AM                                   blood glucose calibration solution   Commonly known as:  no brand specified   Use to calibrate blood glucose monitor as directed.                                blood glucose lancets standard   Commonly known as:  no brand specified   Use to test blood sugar 3 times daily or as directed.                                blood glucose monitoring meter device kit   Commonly known as:  no brand specified   Use to test blood sugars 3 times daily or as directed                                blood glucose monitoring test strip   Commonly known as:  no brand specified   Use  to test blood sugars 3 times daily or as directed                                carvedilol 12.5 MG tablet   Commonly known as:  COREG   Take 1 tablet (12.5 mg) by mouth 2 times daily (with meals)   Last time this was given:  12.5 mg on 4/6/2017  7:38 AM   Next Dose Due:  4/6/17 9 pm                                      CENTRUM SILVER per tablet   Take 1 tablet by mouth daily   Last time this was given:  1 tablet on 4/6/2017  9:04 AM   Next Dose Due:  4/7/17 9 am                                   CITALOPRAM HYDROBROMIDE PO   Take 20 mg by mouth daily   Last time this was given:  20 mg on 4/6/2017  9:04 AM   Next Dose Due:  4/7/17 9 am                                    Cranberry 405 MG Caps   Take 810 mg by mouth daily (2 x 405 mg capsules = 810 mg dose)   Next Dose Due:  4/7/17 9 am                                    ferrous sulfate 325 (65 FE) MG tablet   Commonly known as:  IRON   Take 325 mg by mouth 2 times daily   Last time this was given:  325 mg on 4/6/2017  9:04 AM   Next Dose Due:  4/6/17 9 pm                                       folic acid 1 MG tablet   Commonly known as:  FOLVITE   Take 1 tablet (1 mg) by mouth daily   Last time this was given:  1 mg on 4/6/2017  9:04 AM   Next Dose Due:  4/7/17 9 am                                    LORazepam 0.5 MG tablet   Commonly known as:  ATIVAN   Take 1 tablet (0.5 mg) by mouth, place under tongue or insert rectally every 4 hours as needed for anxiety (restlessness)                                   MEDICATION INSTRUCTION   If care facility cannot accept or use ranges, facility is instructed to use lower end of dosing range                                MEDICATION INSTRUCTION   If care facility cannot accept or use ranges, facility is instructed to use lower end of dosing range                                morphine 2.5 MG solu-tab   Place 1 tablet (2.5 mg) under the tongue every 2 hours as needed for moderate to severe pain (and/or shortness of breath.)      "                              polyethylene glycol powder   Commonly known as:  MIRALAX/GLYCOLAX   Take 17 g by mouth daily as needed                                   sennosides 8.6 MG tablet   Commonly known as:  SENOKOT   Take 2 tablets by mouth 2 times daily as needed for constipation                                   VITAMIN D (CHOLECALCIFEROL) PO   Take 2,000 Units by mouth daily   Last time this was given:  2,000 Units on 4/6/2017  9:04 AM   Next Dose Due:  4/7/17 9 am                                    * Notice:  This list has 2 medication(s) that are the same as other medications prescribed for you. Read the directions carefully, and ask your doctor or other care provider to review them with you.              More Information      Honoring Choices - Your Rights: Making Your Own Health Care Treatment Decisions  [place] Law:  [place] law allows you to inform others of your health care wishes. You have the right to state your wishes or appoint an agent in writing so that others will know what you want if you can't tell them because of illness or injury. The information that follows tells about health care directives and how to prepare them. It does not give every detail of the law.  What is a health care directive?  A health care directive is a written document that informs others of your wishes about health care. It allows you to name a person (\"agent\") to decide for you if you are unable to decide. It also allows you to name an agent if you want someone else to decide for you while you still have capacity. You must be at least 18 years old to make a health care directive.  Why have a health care directive?  A health care directive is important if your attending physician determines you can't communicate your health care choices (because of physical or mental incapacity). It is also important if you wish to have someone else make your health care decisions. In some circumstances, your directive may state " that you want someone other than an attending physician to decide when you cannot make your own decisions.  Must I have a health care directive? What happens if I don't have one?  You don't have to have a health care directive. But, writing one helps to make sure your wishes are followed. You will still receive medical treatment if you don't have a written directive. Health care providers will listen to what people close to you say about your treatment preferences, but the best way to be sure your wishes are followed is to have a health care directive.  How do I make a health care directive?  There are forms for health care directives. You don't have to use a form, but your health care directive must meet the following requirements to be legal:    Be in writing, dated, and state your name.    Be signed by you or someone you authorize to sign for you when you can understand and communicate your health care wishes.    Have your signature verified by a  or two witnesses (notaries and witnesses cannot also be named as agent).    Include the appointment of an agent to make health care decisions for you and/or instructions about the health care choices you wish to make.  Before you prepare or revise your directive, you should discuss your health care wishes with your doctor or other health care provider. Information about where to get health care directive forms is given at the end of this document.  What can I put in a health care directive?  You have many choices of what to put in your health care directive. For example, you may include:    The person you trust as your agent to make health care decisions for you. You can name alternate agents, in case the first agent is unavailable, or joint agents.    Your goals, values, preferences, and cultural beliefs about health care.    The types of medical treatment you would want (or not want).    How you want your agent or agents to decide.    Where you want to  receive care.    Instructions about artificial nutrition and hydration.    Mental health treatments that use electroshock therapy or neuroleptic medications.    Instructions if you are pregnant.    Donation of organs, tissues and eyes.     arrangements.    Who you would like as your guardian or conservator if there is a court action.  You may be as specific or as general as you wish. You can choose which issues or treatments to deal with in your health care directive.  Are there any limits to what I can put in my health care directive?  There are some limits about what you can put in your health care directive. For instance:    Your agent must be at least 18 years of age.    Your agent cannot be your health care provider, unless the health care provider is a family member or you give reasons for the naming of the agent in your directive.    You cannot request health care treatment that is outside of reasonable medical practice.    You cannot request assisted suicide.  How long does a health care directive last? Can I change it?  Your health care directive lasts until you change or cancel it. As long as the changes meet the health care directive requirements listed above, you may cancel your directive by any of the following:    A written statement saying you want to cancel it    Destroying it    Telling at least two other people you want to cancel it    Writing a new health care directive.  What should I do with my health care directive after I have signed it?  You should inform others of your health care directive and give people copies of it. You may wish to inform family members, your health care agent or agents, and your health care providers that you have a health care directive. You should give them a copy. It's a good idea to review and update your directive as your needs change. Keep it in a safe place where it is easily found.   We are committed to making your health care wishes known. You may give a  copy of your directive to any care team member or bring or mail a copy to any of our locations, and we will keep it in your medical record.  What if I've already prepared a health care document? Is it still good?  Before August 1, 1998, [place] law provided for several other types of directives, including living gale, durable health care mayberry of  and mental health declarations. The law changed so people can use one form for all their health care instructions. Forms created before August 1, 1998 are still legal if they followed the law in effect when written. They are also legal if they meet the requirements of the new law (described above). You may want to review any existing documents to make sure they say what you want and meet all requirements.  I prepared my directive in another state. Is it still good?  Health care directives prepared in other states are legal if they meet the requirements of the other state's laws or the [place] requirements. But requests for assisted suicide will not be followed.  What if my health care provider refuses to follow my health care directive?  Your health care provider generally will follow your health care directive, or any instructions from your agent, as long as the health care follows reasonable medical practice. But, you or your agent cannot request treatment that will not help you or which the provider cannot provide. If the provider cannot follow the agent's directions about life-sustaining treatment, the provider must inform the agent. The provider must also document the notice in your medical record. The provider must allow the agency to arrange to transfer you to another provider who will follow the agent's directions.  What if I believe a health care provider has not followed health care directive requirements?  Complaints of this type can be filed with the Office of Health Facility Complaints at [phone] ([facility]) or toll free at  [phone].  What if I believe a health plan has not followed health care directive requirements?  Complaints of this type can be filed with the [place] Health Information Clearinghouse at [phone] or toll free at [phone].  How to obtain more information  Ask any care team member for information, materials or how to register for a free class on advance care planning and creating a health care directive. Or, visit www.[employer].org/choices, or call [phone] or [phone].   Also: [place] Board on Aging's Senior [employer], 1-692.764.4989. Another health care directive form is at: www.mnaging.  For informational purposes only. Not to replace the advice of your health care provider.  Copyright   2012 [employer]. All rights reserved. Konoz 1626 - REV 06/16.             Chronic Kidney Disease (CKD)    The role of the kidneys is to remove waste products and excess water from the blood.  When the kidneys do not function normally and waste products begin to build up in the blood, this is called chronic kidney disease (CKD). CKD is defined as the presence of kidney damage or a decrease in kidney function lasting 3 months or more. CKD allows excess water, waste, and toxic substances to build up in the body. This can eventually become life-threatening, requiring dialysis or a kidney transplant to stay alive. This most severe form is called end stage renal disease or ESRD.  Diabetes is the leading causes of chronic renal failure. Other causes include high blood pressure, hardening of the arteries (atherosclerosis), lupus, inflammation of the blood vessels (vasculitis), prior viral and bacterial infections, and others. Certain over-the-counter pain medicines can cause renal failure when taken often over a long period of time. These include aspirin, ibuprofen, and related anti-inflammatory medicines called NSAIDs (nonsteroidal anti-inflammatory drugs).  Home care  The following  guidelines will help you care for yourself at home:  1. If you have diabetes, talk to your doctor about the quality of your blood sugar control and any adjustments needed to your diet, lifestyle, or medicines.  2. If you have high blood pressure:    Take prescribed medicine to lower your blood pressure to the recommended goal of less than 130/80.    Take up a regular exercise program that you enjoy. Check with your doctor to be sure your planned exercise program is right for you.    Reduce your salt (sodium) intake. Your doctor can tell you how much salt per day is safe for you.  3. If you are overweight, talk to your doctor about a weight loss plan.  4. If you smoke, you must quit. Smoking worsens kidney disease. Talk to your doctor about ways to help you quit.  For more information, visit the following links:    www.smokefree.gov/sites/default/files/pdf/clearing-the-air-accessible.pdf    www.smokefree.gov    www.cancer.org/healthy/stayawayfromtobacco/guidetoquittingsmoking/  5. Most patients with chronic renal failure need to follow a special diet.  Be sure you understand yours. In general, you will need to restrict protein, salt, potassium, and phosphorus. You also need to limit fluid intake. A calcium supplement may be prescribed to protect your bones from osteoporosis.  6. CKD is a risk factor for heart disease. Talk to your doctor about any other risk factors you might have and what you can do to lessen them.  7. Talk to your doctor about any medicines you are taking to determine if they need to be reduced or stopped.  8. Avoid the following over-the-counter medicines, or consult your doctor before using:    Aspirin and nonsteroidal anti-inflammatory drugs (NSAIDs) such as ibuprofen or naproxen (short-term use of acetaminophen for fever or pain is okay)    Laxatives and antacids containing magnesium or aluminum    Fleet or phospho soda enemas containing phosphorus    Certain stomach acid-blocking medicine such  as cimetidine or ranitidine     Decongestants containing pseudoephedrine     Herbal supplements  Follow-up care  Follow up with your doctor or as advised by our staff. Contact one of the following for more information:    American Association of Kidney Patients (131) 434-8232 www.aakp.org    National Kidney Foundation ([phone] 096-0569 www.kidney.org    American Kidney Fund (588) 773-3113 www.kidneyfund.org    National Kidney Disease Education Program (190) 0IVBUIU www.nkdep.nih.gov  [NOTE: If an X-ray, EKG (cardiogram), or other diagnostic test was taken, another specialist will review it. You will be notified of any new findings that may affect your care.]  When to seek medical care  Get prompt medical attention or contact your doctor if any of the following occur:    Nausea or vomiting    Fever greater than 100.4 F (38 C)    Severe weakness, dizziness, fainting, drowsiness, or confusion    Chest pain or shortness of breath    Unexpected weight gain or swelling in the legs, ankles, or around the eyes    Heart beating fast, slow, or irregularly    Decrease or absent urine output    5573-9602 [employer]. [address] [place] [address]. All rights reserved. This information is not intended as a substitute for professional medical care. Always follow your healthcare professional's instructions.                Right-Sided Congestive Heart Failure    The heart is a large muscle that pumps blood throughout the body. Blood carries oxygen to all the organs (including the brain), muscles, and skin. After the body takes the oxygen out of the blood, the blood returns to the heart. The right side of the heart collects that blood and pumps it to the lungs to get fresh oxygen. This oxygen-rich blood from the lungs then returns to the left side of the heart, where it is pumped back out to the rest of the body and the brain, starting the process all over.  Heart failure (HF) occurs when the heart muscle is  weakened. This can occur after a heart attack or with disease of the coronary arteries that affects the heart over time, and in turn affects the pumping action of the heart.  When the right side of the heart is weakened, it can t handle the blood it is getting from the rest of the body. This blood returns to the heart through veins. When too much pressure builds up in the veins, fluid leaks out into the tissues. Gravity then causes that fluid to spread to those parts of the body that are the lowest. So one of the first symptoms of heart failure is swelling in the feet and ankles. If the condition worsens, the swelling can even go up past the knees. In more severe cases, the liver may also become congested with extra fluid.  Causes of right-sided heart failure    Coronary artery disease    Heart attack in the past (heart attack is also known as acute myocardial infarction, or AMI)    High blood pressure, particularly in the pulmonary circulation    Damaged heart valve    Diabetes    Obesity    Cigarette smoking    Alcohol abuse    Increased pressure of the lungs weakening the right side of the heart  Treatment  Heart failure is a chronic condition. There is no cure. The purpose of medical treatment is to improve the pumping action of the heart, and remove excess water and fluid from the body. A number of medicines can help reach this goal, improve symptoms, and prevent the heart from becoming weaker. In some cases of severe heart failure, mechanical devices can be implanted to help with the heart's pumping function. Another major goal is to better treat the causes of heart failure, such as diabetes, high blood pressure, and your lifestyle.  Home care    Check your weight every day. A sudden increase in weight gain could mean worsening heart failure.    Use the same scale every day.    Weigh yourself at the same time every day.    Make sure the scale is on the floor, not on a rug.    Keep a record of your weight every  day so your healthcare provider can see it. If you are not given a log sheet for this, keep a separate journal for this purpose.     Cut back on how much salt (sodium) you eat:    Your healthcare provider will tell you  what level of salt you can have daily, usually 2,000 mg or less.    Avoid high-salt foods. These include olives, pickles, smoked meats, processed foods, and salted potato chips.    Don't add salt to your food at the table. Use only small amounts of salt when cooking.    Follow your healthcare provider's recommendations about how much fluid you should have.    Stop smoking.    Cut back on the amount of alcohol you drink.    Lose weight if you are overweight. The excess weight adds a lot of stress on the workload of the heart.    Stay active. Talk with your provider about an exercise program that is safe for your heart.    Keep your feet elevated to reduce swelling. Ask your provider about support hose as a preventive treatment for daytime leg swelling.    Follow your healthcare provider's instructions closely.  Besides taking your medicine as instructed, an important part of treatment is lifestyle changes. These include diet, physical activity, stopping smoking, and weight control.  Improve your diet. Often in the hospital, people are given a heart healthy diet. This includes more fresh foods, lower fat, less processed foods, and lower salt.  Follow-up care  Follow up with your healthcare provider, or as advised. Make sure to keep any appointments that were made for you. This can help better control heart failure.  If an X-ray was done, you will be told of any new findings that may affect your care.  Call 911  Call 911 if you:    Become severely short of breath    Feel lightheaded, or feel like you might pass out or faint    Have chest pain or discomfort that's different than usual, the medicines your provider told you to use for this don't help, or the pain lasts longer than 10 to 15 minutes    You  suddenly develop a rapid heart rate  When to seek medical advice  Call your healthcare provider right away if you have any of these signs. They may mean your heart failure is getting worse:    Sudden weight gain. This means more than 2 or more pounds in 1 day or 5 pounds in 1 week, or whatever weight gain you were told to report by your provider.    Trouble breathing not related to being active    New or increased swelling of your legs or ankles    Swelling or pain in your abdomen    Breathing trouble at night. This means waking up short of breath or needing more pillows to elevate your upper body to breathe.    Frequent coughing that doesn t go away    Feeling much more tired than usual    0246-6297 [employer]. [address] [place] [address], [place] [address]. All rights reserved. This information is not intended as a substitute for professional medical care. Always follow your healthcare professional's instructions.                Diet for Chronic Kidney Disease  Following a special diet when you have kidney disease can help you stay as healthy as possible. Your doctor or dietitian should make a special diet plan just for you.    Here are some good eating rules:    Protein. Eating protein is important for your body, but too much protein can put a strain on the kidneys. Eating less protein may slow the progression of chronic kidney disease. Foods high in protein include meat, fish, eggs, cheese, and other dairy products. A registered dietitian can help you plan a diet that contains the right amount of protein for you.    Sodium. Excess salt in your diet makes you retain water. Talk to your doctor or dietitian to find out how much sodium per day you are allowed. This will help you avoid fluid retention and help control high blood pressure. Learn to read food labels to know how much sodium is in one serving. Foods high in salt include processed meats, canned and boxed foods, sauces, salted chips and snacks,  pickled foods, frozen dinners, and restaurant and fast food.    Fluids. If you have advanced kidney disease, you will need to limit the water and fluids you drink. Otherwise, excess water builds up in the body. The exact amount of fluid you can drink depends on your kidney function. Ask your doctor how much water you can safely drink each day.    Potassium. In advanced kidney disease, your potassium level can go dangerously high. This affects the heart and can cause arrhythmia (irregular heartbeat). Ask your doctor or dietitian if you should limit potassium in your diet. Foods high in potassium include dairy products (milk, yogurt, cheese), dried beans, bananas, oranges, potatoes, tomatoes, spinach, cantaloupe, honeydew melon, dried fruits, and nuts.     Calcium. Calcium is important to build strong bones. Foods high in calcium are also high in phosphorus, which can cause calcium to be taken from your bones. Limiting foods high in phosphorus will help keep calcium in your bones. Ask your doctor how much calcium you should get each day.    Phosphorus. In advanced kidney disease, your phosphorus level can go dangerously high. This affects many systems in the body and can damage your heart. Limit your intake of phosphorus-rich foods such as dried beans and peas, nuts, peanut butter, cocoa, beer, cola drinks, and dairy products.    [id number] [employer]. [address] [place] [address]. All rights reserved. This information is not intended as a substitute for professional medical care. Always follow your healthcare professional's instructions.                Diabetes and Heart Disease  If you have diabetes, you are two to four times more likely to have heart disease than someone without diabetes. This higher risk is due to diabetes, but it is also due to other risk factors for heart disease that occur in people with diabetes. But there s good news. You can help control your health risks by making  some changes in your life. You can take steps to reduce your risk of heart disease by half--similar to the risk in people who don't have diabetes.        Your health care team will develop a treatment plan that works best for you.     Your main risk factors  Three major risk factors for heart disease are high blood sugar, high blood pressure, and high levels of lipids. By keeping risk factors under control, you can help keep your heart and arteries healthy. This may reduce your chances of a heart attack.    Blood sugar. High blood sugar can make artery walls tough and rough. Plaque (waxy material in the blood) can then build up, making it harder for blood to flow through the arteries. Having high blood sugar increases the chances of having high blood pressure and high cholesterol.    Blood pressure. When blood pressure is high all the time, artery walls become damaged, increasing the risk for plaque build up.    Lipids. The body needs some lipids in the blood to stay healthy. But lipid levels that are too high can damage the artery walls. Lipids include cholesterol and triglycerides. There are two kinds of cholesterol. LDL ( bad ) cholesterol can damage the arteries. But HDL ( good ) cholesterol helps clear LDL cholesterol from the blood vessels. This helps keep the arteries healthy. When blood sugar is high, the level of triglycerides in the blood may also be high. High blood triglyceride levels can cause plaque to form.   Other risk factors  Certain lifestyle factors can increase levels of your blood sugar, blood pressure, and lipids. Such increases raise your risk of heart disease.    Smoking damages the lining of your arteries. This allows plaque to build up in the artery walls. Smoking also constricts (narrows) the arteries, which can raise blood pressure. Smoking also increases your risk of getting type 2 diabetes.    Not being active makes it harder for your heart to do its work. Inactivity is linked to many  other risk factors, such as high blood pressure and poor cholesterol levels. Inactivity also increases your risk of getting type 2 diabetes.    Being overweight makes it harder for your body to use insulin. It also makes your heart work too hard. Being overweight is also the main contributor to the development of type 2 diabetes,   Changes you can make     Take your medications as directed each day, even if you feel fine.   Following a few simple steps can help keep your risk factors under control. Work with your health care team to reach your goals.    Quitting smoking could save your life. Smoking damages the lining of the blood vessels and raises blood pressure. Smoking also affects how your body uses insulin. This makes it harder to keep blood sugar under control. If you smoke and need help quitting, talk to your health care team.     Testing your blood sugar is the only way to know whether it is under control. Be sure to test your blood sugar yourself. Also get your blood tested in the lab, as directed.    Monitoring your blood pressure and lipid levels can help you achieve safe levels. Visit your health care team as scheduled.    Taking medications as directed can help control blood sugar, blood pressure, blood clotting, and/or cholesterol levels.    Eating right can reduce your risk factors and help you lose weight. Try to limit the amount of processed or refined carbohydrates you eat at one time. Cut back on your total calorie intake. Eat foods low in saturated fat and cholesterol. Eat fiber, including vegetables and whole grains, and cut down on salt. A dietitian or diabetes educator can help form a meal plan that works for you.    Being active can help reduce your weight, strengthen your heart, and lower your lipid levels and blood pressure. Exercise and activity are good for your whole body. Talk to your health care team about increasing your activity safely over time.    Keeping your appointments with  your health care provider helps you stay healthy. Go in for checkups and lab tests as scheduled.    6117-8619 [employer]. [address] [place] [address], [place] [address]. All rights reserved. This information is not intended as a substitute for professional medical care. Always follow your healthcare professional's instructions.                Heart Failure: Tracking Your Weight  You have a condition called heart failure (also known as congestive heart failure, or CHF). When you have heart failure, a sudden weight gain or a steady rise in weight is a warning sign that your body is retaining too much water and salt. This could mean your heart failure is getting worse. Weighing yourself each day is the best way to know if you re retaining water. If your weight goes up quickly, call your doctor. You will be given instructions on how to get rid of the excess water. This will help your heart work better.  Call your doctor if you gain more than 2 pounds in 1 day, or more than 5 pounds in 1 week, or whatever weight gain you were told to report by your doctor. This is often a sign of worsening heart failure. Your doctor will tell you what to do next.   Tips for weighing yourself      Weigh yourself at the same time each morning, wearing the same clothes. Weigh yourself after urinating and before eating.    Use the same scale each day. Put it on a flat, hard surface -- not on a rug or carpet.    Do not stop weighing yourself. If you forget one day, weigh again the next morning.  How to use your weight chart    Keep your weight chart near the scale. Write your weight on the chart as soon as you get off the scale.    Fill in the month and the start date on the chart. Then write down your weight each day. Your chart will look like this:      If you miss a day, leave the space blank. Weigh yourself the next day and write your weight in the next space.    Take your weight chart with you when you go to see your doctor.     1823-3949 [employer]. [address] [place] [address]. All rights reserved. This information is not intended as a substitute for professional medical care. Always follow your healthcare professional's instructions.                Heart Failure: Warning Signs of a Flare-Up  You have a condition called heart failure (also known as congestive heart failure, or CHF). Once you have heart failure, flare-ups can happen. Below are signs that can mean your heart failure is getting worse. If you notice any of these warning signs, call your health care provider.  Swelling      Your ankles or lower legs get puffier.    Your shoes feel too tight.    Your clothes are tighter in the waist.    You have trouble getting rings on or off your fingers.  Shortness of breath    You have to breathe harder even when you re doing your normal activities or when you re resting.    You wake up at night short of breath or coughing.    You need to use more pillows or sit up to sleep.  Other warning signs    You feel weaker, dizzy, or more tired.    You have chest pain or changes in your heartbeat.    You have a cough that won t go away.    You can t remember things or don t feel like eating.  Tracking your weight  Gaining weight is often the first warning sign that heart failure is getting worse. Gaining even a few pounds can be a sign that your body is retaining excess water and salt. Weighing yourself each day is the best way to know if you're retaining water. Your health care provider will show you how to track your weight. Call your doctor if you gain more than 2 pounds in 1 day, 5 pounds in 1 week, or whatever weight gain you were told to report by your doctor. This is often a sign of worsening heart failure. Your doctor will tell you what to do next.      1535-7346 [employer]. [address] [place] [address]. All rights reserved. This information is not intended as a substitute for  professional medical care. Always follow your healthcare professional's instructions.

## 2017-04-03 NOTE — PHARMACY-ADMISSION MEDICATION HISTORY
Admission medication history interview status for the 4/2/2017  admission is complete. See EPIC admission navigator for prior to admission medications     Medication history source reliability:Good    Actions taken by pharmacist (provider contacted, etc):Obtained MAR from [facility]     Additional medication history information not noted on PTA med list :None    Medication reconciliation/reorder completed by provider prior to medication history? No    Time spent in this activity: 15 minutes     Prior to Admission medications    Medication Sig Last Dose Taking? Auth Provider   CITALOPRAM HYDROBROMIDE PO Take 20 mg by mouth daily 4/2/2017 at 0900 Yes Unknown, Entered By History   Cranberry 405 MG CAPS Take 810 mg by mouth daily (2 x 405 mg capsules = 810 mg dose) 4/2/2017 at 0900 Yes Unknown, Entered By History   ACETAMINOPHEN PO Take 650 mg by mouth every 4 hours as needed for pain Past Week at PRN Yes Reported, Patient   folic acid (FOLVITE) 1 MG tablet Take 1 tablet (1 mg) by mouth daily 4/2/2017 at 0900 Yes Papo Ochoa MD   sennosides (SENOKOT) 8.6 MG tablet Take 2 tablets by mouth 2 times daily as needed for constipation Past Week at PRN Yes Papo Ochoa MD   ferrous sulfate (IRON) 325 (65 FE) MG tablet Take 325 mg by mouth 2 times daily  4/2/2017 at 1700 Yes Reported, Patient   polyethylene glycol (MIRALAX/GLYCOLAX) powder Take 17 g by mouth daily as needed  Past Week at PRN Yes Reported, Patient   labetalol (NORMODYNE) 300 MG tablet Take 1 tablet (300 mg) by mouth 2 times daily 4/2/2017 at 1700 Yes Whitney Duarte MD   Atorvastatin Calcium (LIPITOR PO) Take 20 mg by mouth every evening  4/2/2017 at 1700 Yes Reported, Patient   Multiple Vitamins-Minerals (CENTRUM SILVER) per tablet Take 1 tablet by mouth daily 4/2/2017 at 0900 Yes    VITAMIN D, CHOLECALCIFEROL, PO Take 2,000 Units by mouth daily  4/2/2017 at 0900 Yes Reported, Patient   blood glucose (NO BRAND SPECIFIED) lancets standard Use to  test blood sugar 3 times daily or as directed.   Tristen Patricia MD   blood glucose calibration (NO BRAND SPECIFIED) solution Use to calibrate blood glucose monitor as directed.   Tristen Patricia MD   blood glucose monitoring (NO BRAND SPECIFIED) meter device kit Use to test blood sugars 3 times daily or as directed   Tristen Patricia MD   blood glucose monitoring (NO BRAND SPECIFIED) test strip Use to test blood sugars 3 times daily or as directed   Tristen Patricia MD

## 2017-04-03 NOTE — CONSULTS
DATE OF SERVICE:  04/03/2017      REQUESTING PHYSICIAN:  Hospitalist Service.      HISTORY OF PRESENT ILLNESS:  I had the pleasure to see Brian Hernadnez, a very pleasant 84-year-old gentleman, in initial consultation today at the request of the inpatient Hospitalist Service.        I have been asked to see the patient today because of concern about hydronephrosis.      The patient who was admitted yesterday has been admitted with a number of significant medical issues which include essential hypertension, coronary artery disease, congestive cardiac failure, atrial fibrillation, history of a stroke with expressive aphasia, dysphagia, peripheral arterial disease, severe pulmonary hypertension, dyslipidemia, chronic renal disease and history of bladder cancer.  He has been admitted predominantly because of shortness of breath and had no chest pain.  At the time of admission, he was afebrile and normotensive but required oxygenation.  His creatinine is 1.94 when compared to baseline in February of this year of 1.45.  Ultrasound done at that time did show mild right hydronephrosis with a lower pole right renal cyst on that side but no sign of kidney stones.      PAST MEDICAL HISTORY:   1.  Hypertension.   2.  Coronary artery disease.   3.  Congestive cardiac failure.   4.  Atrial fibrillation.   5.  History of cerebrovascular disease with a stroke in 2014.   6.  Peripheral vascular disease.   7.  Pulmonary hypertension.   8.  History of invasive bladder cancer who had declined major surgery but who has had both chemotherapy and radiation therapy.   9.  Dyslipidemia.   10.  Gastroesophageal reflux.   11.  Chronic renal disease.   12.  Anemia.   13.  Osteoarthritis.   14.  Depression.   15.  Peripheral neuropathy.   16.  History of rectal cancer, status post hemicolectomy.      PAST SURGICAL HISTORY:  Includes transurethral resection of bladder tumors on 3 occasions, right hip fracture in 2015.  Left carotid endarterectomy  in 2014, bilateral total hip arthroplasties.  Surgery on the lung for lung nodule, surgery for hemicolectomy of rectal cancer and cataract surgery.      MEDICATIONS:  At home include atorvastatin, citalopram, labetalol.      SOCIAL HISTORY:  Nonsmoker, , 3 children.      FAMILY HISTORY:  Noncontributory.      REVIEW OF SYSTEMS:  At the time I saw him today, there were no other major symptoms at this time.  He was getting some slight discomfort in his low back.      PHYSICAL EXAMINATION:   VITAL SIGNS:  At the time I saw him were as follows, afebrile, blood pressure of 128/74 and a pulse rate of 64.   GENERAL:  Pleasant gentleman, not in distress, with some difficulty with communication.   HEENT:  There was no evidence of jaundice.  There was mild paleness of the mucous membranes.   SKIN:  Otherwise normal to examination.     HEART:  Revealed a normal rhythm without significant murmurs.   LUNGS:  Clear to percussion and essentially to auscultation.   ABDOMEN:  Soft, nondistended otherwise unremarkable.   EXTREMITIES:  Showed no significant peripheral edema.   NEUROLOGIC:  No focal obvious abnormal signs that were new in the central peripheral nervous systems.      LABORATORY DATA:  Showed the following serum creatinine currently 1.99 this morning.  Hemoglobin 7.4, white count 7900, platelets 313,000.  The CT scan done on 04/02 shows bilateral hydronephrosis which is relatively mild when carefully reviewed.  There is also mild dilatation of the ureters and some enlargement of the prostate gland.      SITUATION:  This patient is DNR/DNI.  He does have a high grade invasive bladder cancer.  He has not been considered a candidate for cystectomy and has had radiation and chemotherapy to the bladder.  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  Subsequent resection was also performed after 3 months  and this showed invasion into the muscularis propria.  Since then he has been followed by a medical oncologist as well as ourselves and is also being followed by the radiation oncologist.  He has tended to be anemic.      I discussed the situation carefully with the staff on the [place] this morning.  There has been a mild increase in the creatinine from his baseline of 1.45 to 1.99.  There is evidence of mild hydronephrosis.  The patient is not complaining of severe pain.  My recommendation at present is I do not think placing stents would be easy and may be very uncomfortable for him and it may not make a significant difference to his renal function given the mild degree of hydronephrosis observed at present.  I would recommend that either a prerenal or renal cause for the rising creatinine be identified if possible, but if this proves not to be the case, then we can have further discussions about whether placing stents is indicated in this rather challenging situation.  I did discuss the whole issue carefully with the patient and with the staff on the floor this morning.         NEENA MERCHANT MD             D: 2017 13:11   T: 2017 13:54   MT: [id number]      Name:     DAVID BINGHAM   MRN:      [id number]        Account:       [id number]   :      1933           Consult Date:  2017      Document: [id number]

## 2017-04-03 NOTE — PROGRESS NOTES
DORIAN  D: DORIAN following for discharge.   I: Spoke with Sky FISHER, pt was living with spouse, and spouse needing some respite, therefore pt moved to [place].  [place] providing 3 meals per day, dressing, ADL's, ambulation with walker, bathing, and meds.  Pt was set up for a  Hospice meeting, but then was hospitalized and spouse not feeling well.  DORIAN spoke with spouse and Edilma at  Hospice.  Pt's spouse will attempt to get transport here, either around 3pm tomorrow for meeting or on Wednesday.  Spouse will call DORIAN and let know when she will get transport.  DORIAN then to follow up with Edilma.   A; Likely hospice meeting in 1-2 days at [facility].   P: SW to follow.

## 2017-04-03 NOTE — ED NOTES
"[facility]  ED Nurse Handoff Report    ED Chief complaint: Shortness of Breath (SOB, \"not feeling right\", pale)      ED Diagnosis:   Final diagnoses:   Acute congestive heart failure, unspecified congestive heart failure type (H)   Renal failure   Elevated liver function tests       Code Status: DNR / DNI----according to prior visit    Allergies: No Known Allergies    Activity level - Baseline/Home:  Stand with Assist of 2----baseline right sided weakness from CVA    Activity Level - Current:   Stand with Assist of 2     Needed?: No    Isolation: No  Infection: Not Applicable    Bariatric?: No    Vital Signs:   Vitals:    04/02/17 2030 04/02/17 2045 04/02/17 2130 04/02/17 2145   BP: 130/85  (!) 136/93    Pulse:       Resp:    (!) 39   Temp:       SpO2: 95% 95%  97%       Cardiac Rhythm: ,    NSR    Pain level:  denies    Is this patient confused?: No----has expressive aphasia    Patient Report: Initial Complaint: SOB, not feeling right today and pale  Focused Assessment: SOB reported with infrequent cough and coarse lungs.  Tender abdomen with palpation  Tests Performed: labs, ekg, CT abdomen, u/s abdomen, CXR  Abnormal Results: bilateral pleural effusions, lactic 2.3,  Negative influenza, BNP 30234, see further lab results  Treatments provided: IV lasix,  O2 3 liters via NC    Family Comments: no family present    OBS brochure/video discussed/provided to patient: N/A    ED Medications:   Medications   0.9% sodium chloride infusion ( Intravenous New Bag 4/2/17 1923)   furosemide (LASIX) injection 40 mg (40 mg Intravenous Given 4/2/17 2137)       Drips infusing?:  No      ED NURSE PHONE NUMBER: *[id number]         "

## 2017-04-03 NOTE — CONSULTS
CARDIOLOGY CONSULTATION       REASON FOR CONSULTATION:  I was asked to see Brian Hernandez in cardiology consultation because of his presentation to [facility] with increasing symptoms of shortness of breath and evidence of congestive heart failure.      HISTORY:  The patient is an 84-year-old slender, frail-appearing white male with a past medical history positive for hypertension, coronary artery disease, congestive heart failure, paroxysmal atrial fibrillation, stroke with expressive aphasia, peripheral arterial disease, severe pulmonary hypertension, dyslipidemia, chronic renal insufficiency, history of bladder cancer who presents with increasing symptoms of shortness of breath.  He had been residing in an assisted living facility because his wife was unable to take care of him.  Most of the information is obtained from the medical records since the patient is a very poor historian and often unable to express the answers to questions.  He did not have thiago symptoms of chest pain, fevers, chills or sweats.  He has had a cough that has been nonproductive.  He has been evaluated in the emergency room where he has not shown to have a fever, but had stable blood pressure and heart rate.  A drop in O2 saturations.  Creatinine was increased to 1.94/1.45 from the previous month, ALT was increased at 381,  and 140 low albumin at 2.4.  Lactic acid mildly elevated.  N-terminal proBNP was 98,138.  Troponin was 0.042.  Other pertinent labs showed white blood cell count 9.4, hemoglobin 7.5.  He had imaging with a CT of the abdomen and pelvis without contrast that demonstrated the following:  Bilateral pleural effusions, right greater than left, cardiomegaly, new bilateral hydronephrosis, questionably enlarged prostate gland with distal ureters moderately obscured by artifact from hip replacements, likely constipation but no evidence of bowel obstruction, diverticulitis, gallbladder was nondistended.   Ultrasound of the abdomen showed no evidence of gallstones or bile duct dilatation with right hydronephrosis and lower pole renal cysts, no obvious renal calculi.  The patient was given an IV dose of Lasix.      The patient's past medical history is extremely long and complex.  It includes hypertension, coronary artery disease, status post LAD stent in 2003 with right coronary artery stent of the circumflex in 2008, last angiogram in 2010 showed stents were patent, but there was some apical LAD disease of 60-70% in the proximal circumflex, 75%, of which was felt to be best managed medically.  The patient has been noted to have congestive heart failure with an ejection fraction noted at 2015 of 45%.  Right ventricle had mild to moderate hypertrophy with borderline reduced systolic function.  He has had a history of atrial fibrillation in the past.  Also, a stroke history, which happened in 2014, new with residual expressive aphasia.  He had not had the anticoagulation resumed because of his bladder cancer and tendency to hematuria, his peripheral vascular disease, status post left carotid endarterectomy in 2014, history of severe pulmonary hypertension documented in the echo at 2015, bladder cancer, history of chemotherapy and radiation therapy with multiple cystoscopies and transurethral resection 3 times in 2015.  He also has a history of dyslipidemia, gastroesophageal reflux disease, chronic renal insufficiency and anemia with slowly increasing severity, osteoarthritis, depression/anxiety, neuropathy and rectal cancer, status post hemicolectomy.        PAST SURGICAL HISTORY:  Remarkable for cataracts, hemicolectomy, lung surgery for nodule, bilateral total hip arthroplasties, left carotid endarterectomy, right hip fracture in 2015, cystoscopy with TURBT in 2016.      HOME MEDICATIONS:   1.  Acetaminophen 650 mg every 4 hours as needed.   2.  Atorvastatin 20 mg at night.     3.  Citalopram 20 mg a day.   4.   Cranberry 810 mg a day.   5.  Ferrous sulfate 325 mg b.i.d.   6.  Folic acid 1 mg a day.   7.  Labetalol 300 mg b.i.d.   8.  Multivitamin 1 per day.   9.  MiraLax 17 g daily as needed.     10.  Sennosides 2 tablets b.i.d. p.r.n.   11.  Vitamin D 2000 units a day.      SOCIAL HISTORY:  There is no history of cigarette smoking.  He has occasional alcoholic beverage.  There is no history of diabetes.      REVIEW OF SYSTEMS:  The patient is feeling better and has had some diuresis after being given IV furosemide.      REVIEW OF SYSTEMS:  Extremely limited because of his expressive aphasia.  There is no recent headaches, seizure, syncope, exacerbation of asthma, pneumonia, bronchitis, dyspeptic symptoms, specific bowel or bladder issues that have not already been described with regards to his hemicolectomy and bladder cancer therapy.  He has had some swelling of the lower extremities with no obvious focal weakness.  Cardiology consultation is requested because of his presentation with evidence for congestive heart failure with elevated N-terminal proBNP and pleural effusions with pulmonary congestion.      PHYSICAL EXAMINATION:   GENERAL:  The patient is an elderly white male.   VITAL SIGNS:  Blood pressure 139/77 with a heart rate of 60-70.   HEENT:  Pupils equal, round, react to light and accommodate.   NECK:  Showed no evidence of significant carotid bruit with jugular venous pressure estimated to slightly increased at 6-7 cm.  There was no palpable thyroid.   CHEST:  Showed decreased breath sounds at the bases, prolonged expiratory phase and isolated crackles and scattered rhonchi.   CARDIAC:  Revealed a fairly regular rhythm with a normal S1, physiologically split S2.  There was a 1-2/6 systolic murmur at the left sternal border with minimal radiation.  No diastolic murmur, rub or S3.   ABDOMEN:  Appeared soft, nontender without obvious palpable liver, spleen or masses.  Bowel sounds are present.   EXTREMITIES:   Showed trace to 1+ edema.  Pulses were 1 to 2+ below the femorals, 2+ above.   NEUROLOGIC:  Patient was able to move all extremities and respond somewhat to external stimuli but mental status was decreased not typically alert and oriented, possibly related to previous stroke.      LABORATORY DATA:  Demonstrated a sodium 140, potassium 4.7, chloride 104, CO2 28, BUN 23, creatinine 1.94, calcium 8.9, albumin 2.4, alkaline phosphatase 108, , , lactic acid 2.3.  N-terminal proBNP 98,138, troponin 0.042, glucose 118.  WBC 9.4, hemoglobin 7.5, hematocrit 24.6, platelet count 358,000.  Electrocardiogram showed a normal sinus rhythm.  There is evidence of left atrial abnormality, possible left ventricular hypertrophy, nonspecific ST-T wave changes.  This is from December.  No electrocardiogram from this admission.  Echocardiogram is still pending.  Chest x-ray demonstrated small bilateral pleural effusions and some increased interstitial prominence in both lungs suggestive of pulmonary congestion with moderate cardiomegaly.      CLINICAL IMPRESSION:   1.  Increased symptoms of shortness of breath with pulmonary congestion and elevated proBNP consistent with congestive heart failure.   2.  History of ischemic heart disease with multivessel involvement, status post multiple stents placed in 2005 and 2010 range with evidence of decreased left ventricular function by echocardiography in 2015, ejection fraction of 45% to 50%.   3.  Renal insufficiency.   4.  Anemia exacerbated from previous numbers of 9-7.5.   5.  History of hypertension.   6.  History of paroxysmal atrial fibrillation.   7.  History of stroke with residual expressive aphasia.   8.  Peripheral vascular disease, status post left carotid endarterectomy in 2014.   9.  History of severe pulmonary hypertension.   10.  History of bladder cancer, status post chemotherapy and radiation therapy and hematuria.   11.  History of dyslipidemia.   12.   Gastroesophageal reflux disease.   13.  Osteoarthritis.   14.  Depression and anxiety.   15.  Neuropathy.   16.  Rectal cancer, status post hemicolectomy.        DISCUSSION:  The patient has very complex medical picture.  He presently is a DNR/DNI.  He has been started since his admission on IV parenteral diuretic therapy with some diuresis, although the INR is not well documented.  He would probably benefit from adjustment of medications to perhaps adjust labetalol to carvedilol for his underlying LV dysfunction and ischemic disease and consideration for addition of either moderate to high dose nitrates or low dose hydralazine.  He will probably continue to need a diuretic closely monitoring renal function and potassium.  He also probably would benefit from a hemoglobin more in the 9 range rather than the 7.5 range.  His anemia should be further evaluated for possible blood loss in addition to his chronic disease.      RECOMMENDATIONS:   1.  Telemetry.   2.  Check echocardiogram for left ventricular function.   3.  Consider possible transfusion or erythropoietin treatment besides iron supplement to raise hemoglobin towards 9.   4.  Would consider switching labetalol to carvedilol in an effort to treat his underlying heart disease and adding either nitrates or hydralazine, avoiding ACE or ARB drugs for the time being because of his renal insufficiency.   5.  Anemia evaluation.   6.  Consider cardiac rehabilitation.   7.  Continue oxygen.   8.  Routine medical followup.         HARJIT BRIGGS MD, [facility]             D: 2017 12:27   T: 2017 13:27   MT: DORITA      Name:     DAVID BINGHAM   MRN:      [id number]        Account:       [id number]   :      1933           Consult Date:  2017      Document: [id number]       cc: Flip Prakash MD

## 2017-04-03 NOTE — ED PROVIDER NOTES
"CHIEF COMPLAINT:  Feeling odd.      HISTORY OF PRESENT ILLNESS:  Brian Hernandez is an 84-year-old gentleman with a history of previous stroke with residual right side deficits and moderate expressive aphasia, which makes him somewhat of a poor historian, who presents from his nursing home/assisted living facility after complaining of not feeling right.  He describes this as feeling \"odd.\"  They were worried at the facility about increased respiratory rate and some labored breathing, so they called 911 for transport.  They also felt that he might be paler than normal.  He denies any fevers or chills, no sore throat or cough.  He denies any chest pain or abdominal pain.  He states he is eating okay.  No headaches, no diarrhea, no dizziness.  He is not sure if his bowel movements and urination have been normal.  The nurse, upon his arrival here, noted his oxygen saturations to be 87% on room air, and he was 96% when placed on nasal cannula.      PAST MEDICAL HISTORY:  Cerebrovascular accident, hypertension, bladder cancer, constipation, peripheral vascular disease, pulmonary hypertension, pulmonary nodules, anemia, paroxysmal atrial fibrillation, depression, chronic kidney disease, coronary artery disease, gastroesophageal reflux disease, hyperlipidemia, osteoarthritis.      PAST SURGICAL HISTORY:  Coronary artery stents, hip surgery, carotid endarterectomy, open reduction internal fixation of the mandible, cornea replacement.      SOCIAL HISTORY:  The patient is an ex-smoker.  Denies any illicit drug or alcohol use.      ALLERGIES:  No known drug allergies.      MEDICATIONS:  Labetalol, Senokot, Lipitor, citalopram, ferrous sulfate, Centrum Silver.      REVIEW OF SYSTEMS:  Please see the HPI.  The remaining systems are negative.      PHYSICAL EXAM:   VITAL SIGNS:  Temperature is 98.1 orally.  Blood pressure is 128/60.  Pulse is 74.  Respiratory rate is 24.  Oxygen saturation was 92% on room air, 97% on nasal cannula. "   CONSTITUTIONAL:  An 84-year-old elderly man, appears frail, lying supine in bed.   HEENT:  Pupils equal, round, reactive to light.  Extraocular movements are intact.  Mucous membranes are tacky.   CARDIOVASCULAR:  Regular rate and rhythm.  No appreciable murmurs.   RESPIRATORY:  The patient has slightly diminished breath sounds, but no acute wheezes, rales or rhonchi.   ABDOMEN:  The patient has tenderness to palpation in the right side of his abdomen, with appreciable mass.  No guarding or rebound.  No pulsatile mass.   NEUROLOGIC:  The patient is alert.  He is oriented to person, place and time.  He does have some mild expressive aphasia from previous stroke, as well as some mild right-sided weakness of the upper and lower extremities.  No sensory deficits.   SKIN:  Warm, dry and intact.   MUSCULOSKELETAL:  Mild weakness of the right upper extremity and lower extremity, otherwise normal range of motion.   PSYCHIATRIC:  Mildly depressed, otherwise normal behavior.      DIAGNOSTIC STUDIES:  EKG shows a sinus rhythm at 69 beats per minute.  Normal OK and QRS interval.  QTc interval is 505.  Nonspecific ST and T-wave abnormalities.  No significant change from EKG done on 12/09/2016.      DIAGNOSTIC TESTS:   1.  White blood cell count is 9.4.  Hemoglobin is 7.5; it is noted that his baseline is 7-9.  Platelets are 358,000.  Hematocrit is 24.6.   2.  Sodium 140, potassium 4.7, chloride 104, bicarbonate 28, glucose 118, BUN 23, creatinine 1.94 (previous creatinine on 02/02/2017 was 1.45), , , bilirubin 0.5.   3.  Influenza swab is negative.   4.  Lactic acid 2.3.   5.  BNP 98,138.   6.  Troponin is 0.042.   7.  Ultrasound of the abdomen, right upper quadrant:  No evidence of gallstones or bile duct dilatation.  Right hydronephrosis and lower pole renal cyst as described, no obvious renal calculi.   8.  CT of the abdomen with oral contrast only:  New bilateral pleural effusions, right larger than left, and  stable cardiomegaly.  New bilateral hydronephrosis and hydroureter.  Questionable enlarged prostate gland.  Distal ureters and bladder are obscured by artifact from the hip replacements.  Possible constipation, but no evidence of bowel obstruction or diverticulitis.  Appendix is normal.  Gallbladder is nondistended, but the wall is slightly indistinct.  Follow-up ultrasound recommended.   9.  Chest x-ray, PA and lateral:  New bilateral pleural effusions and new minimal bibasilar airspace opacities that may represent atelectasis or pneumonia.  Increased interstitial prominence in both lungs that may represent early pulmonary edema.      EMERGENCY DEPARTMENT COURSE AND TREATMENT:  This patient was placed on a cardiac monitor and a pulse oximeter, and a peripheral IV was established.  It is noted that the patient presented at a time when Epic had a widespread downtime with no ability to read his chart, so information was very limited.  Diagnostic evaluation was performed, as above.  He is chronically anemic, but his white blood cell count is normal with no evidence of left shift.  He has worsening renal function, otherwise no metabolic derangement.  He did have elevated liver function, and with right-sided abdominal pain, he underwent a CT of his abdomen as well as ultrasound.  There is no acute infection or obstruction, and no sign of kidney stone, but he does have right-sided hydroureter, and a Wilkins catheter was recommended, but the patient refuse, he did urinate quite a bit.      He does have CHF both on his chest x-ray and CT, as well as blood work.  He was given IV Lasix for this.  At this time, I think that the CHF most likely caused his symptoms which caused concern at his facility today where he might have had labored breathing and increased respiratory rate.  I am not sure what is causing the elevated liver function, certainly medications could be in the differential, but he does not appear to be septic, and  this does not appear to be shock liver.  His lactic acid is slightly elevated, but I suspect that this is due to dehydration rather than sepsis due to his lack of symptoms, no fever, and WBC being normal in the setting of acute RF.  He did consent to admission.      DIAGNOSES:   1.  Congestive heart failure.   2.  Chronic anemia.   3.  Renal failure.  4.  Hydronephrosis/hydrouerter   5.  Elevated liver function.         TAISHA ZAMBRANO MD             D: 2017 22:10   T: 2017 01:13   MT: WANDA#[id number]      Name:     DAVID BINGHAM   MRN:      [id number]        Account:      [id number]   :      1933           Visit Date:   2017      Document: [id number]       cc: Flip Prakash MD

## 2017-04-03 NOTE — H&P
DATE OF ADMISSION:  04/02/2017.      PRIMARY CARE PHYSICIAN:  Dr. Prakash.      CHIEF COMPLAINT:  Shortness of breath.      HISTORY OF PRESENT ILLNESS:  Brian Hernandez is  an 84-year-old male patient with history noted below including hypertension, coronary artery disease, CHF, atrial fibrillation, stroke with expressive aphasia, peripheral arterial disease, severe pulmonary hypertension, dyslipidemia, chronic kidney disease and history of bladder cancer, who presents with the above issues.  The patient currently is residing at an assisted living facility which is respite care as his wife who was his primary caretaker has been unable to care for him.  The patient presented from the facility today with shortness of breath.  No chest pain.  No fevers or chills.  The patient's son does note he has had a cough, but has not been really productive.  No fevers or chills.  No nausea or vomiting.      The patient seen in the ER by Dr. Yarbrough.  Vitals showed temperature 98.1 degrees, heart rate 74, blood pressure 128/60, respiratory rate 24, and O2 saturations dropped to 88% on room air.  He was placed on oxygen with improvement.  He went on to have laboratory evaluation which showed his creatinine was elevated at 1.94 compared with 1.45 in 02/2017.  His ALT was 381, AST was 290, 140, and otherwise LFTs are normal.  He had a low albumin at 2.4.  Did have lactic acid that was elevated at 2.3.  N-terminal proBNP is 98,138.  Troponin was 0.042.  Other labs showed white count 9.4, hemoglobin 7.5.  Did go on to have imaging including a CT of the abdomen and pelvis without contrast that showed the following:  New bilateral pleural effusions, right larger than left with stable cardiomegaly; new bilateral hydronephrosis on hydroureter; questionable enlarged prostate gland with distal ureters and bladder obscured by artifact from the hip replacements; possible constipation but no evidence of bowel obstruction or diverticulitis;  gallbladder nondistended with a wall slightly indistinct.  Ultrasound of the abdomen showed no evidence of gallstones or bile duct dilatation with right hydronephrosis and lower pole renal cyst.  No obvious renal calculi.  The patient was given a dose of IV Lasix 40 mg and, given his issues, request for admission was made.      PAST MEDICAL HISTORY:   1.  Hypertension.   2.  Coronary artery disease.  History of LAD stent in 2003.  History of RCA stents and left circumflex stent in 2008.  Last angiogram in 2010 showed that the stents were patent, but there was some apical LAD, 60-70% disease, and there was also a proximal left circumflex (before this) of 75% and these were managed medically.   3.  Congestive heart failure.  Echocardiogram in 08/2015 showed a left ventricular EF of 45% to 50% with wall motion abnormalities and RV with mild to moderate hypertrophy and borderline reduced systolic function with severe pulmonary hypertension.   4.  Atrial fibrillation.   5.  Stroke history.  The patient suffered a stroke around 2014 with residual expressive aphasia.  He does have a history of atrial fibrillation but is not on anticoagulation given history of hematuria in the setting of bladder cancer.   6.  Peripheral vascular disease.  Status post left carotid endarterectomy in 2014.   7.  Severe pulmonary hypertension.  Echo from 2015 as above.  8.  Bladder cancer.  History of treatment including chemotherapy and radiation therapy.  He has history of cystoscopies with transurethral resection of bladder tumors 3 times in 2016 by Dr. Porter.  He also has history of hematuria.   9.  Rectal cancer, status post hemicolectomy.   10.  Dyslipidemia.   11.  Gastroesophageal reflux disease.   12.  Chronic kidney disease.  Baseline creatinine 1.2-1.4.   13.  Anemia.  Iron studies from 02/2017 suggest anemia of chronic disease component.  14.  Osteoarthritis.   15.  Depression/anxiety.   16.  Neuropathy.      PAST SURGICAL  HISTORY:   1.  Status post cataract surgery.   2.  Status post hemicolectomy.   3.  Status post lung surgery for nodule.   4.  Status post bilateral total hip arthroplasties.   5.  Status post left carotid endarterectomy in 2014.   6.  Status post ORIF right hip fracture in 2015.   7.  Status post cystoscopy with TURBT x3 in 12/2016.      ALLERGIES:  No known drug allergies.      HOME MEDICATIONS:   1.  Acetaminophen 650 mg q.4 h. p.r.n.   2.  Atorvastatin 20 mg every evening.   3.  Citalopram 20 mg daily.   4.  Cranberry 810 mg daily.   5.  Ferrous sulfate 325 mg b.i.d.   6.  Folic acid 1 mg a day.   7.  Labetalol 300 mg b.i.d.   8.  Multivitamin daily.   9.  MiraLax 17 grams daily as needed.   10.  Sennosides 2 tablets b.i.d. p.r.n.   11.  Vitamin D 2000 units a day.      SOCIAL HISTORY:  The patient does not smoke.  Occasional beer and octavio previously.  He is .  He has 3 children.  Retired and used to work at a [employer] factory.      FAMILY HISTORY:  Reviewed and not felt to be contributory.      REVIEW OF SYSTEMS:  As in HPI, otherwise 10-point systems negative.      PHYSICAL EXAMINATION:   VITAL SIGNS:  Temperature 98.1 degrees, heart rate 74, blood pressure 136/93, respiration rate 39, and O2 saturations 97% on 3 liters.   GENERAL:  An 84-year-old male patient lying in bed.  He is conversant and friendly.  Does not appear severely tachypneic.   HEENT:  Pupils equal, round, reactive.  No scleral icterus or conjunctival injection.  Oropharynx reveals no gross erythema or exudate.   NECK:  No bruits, does have elevated JVP and sitting at 45 degrees.   HEART:  Regular rate and rhythm without significant murmurs, rubs, gallops.   LUNGS:  Diminished in the bases, no crackles or wheezes.   ABDOMEN:  Soft, nontender, nondistended, positive bowel sounds, no femoral bruits.   EXTREMITIES:  Show no significant edema with palpable dorsalis pedis pulses bilaterally.   NEUROLOGIC:  No gross focal motor or  sensory deficits.      LABORATORY DATA:  Show sodium 140, potassium 4.7, chloride 104, bicarbonate 28, BUN 23, creatinine 1.894, calcium 8.9.  LFTs showed albumin of 2.4, total protein 7.1.  Total bilirubin 0.5, alkaline phosphatase 108, , , lactic acid 2.3.  N-terminal proBNP 98,138, troponin 0.042.  CBC showed white count 9.4, hemoglobin 7.5, platelets 358.      IMAGING:  As above.      ASSESSMENT AND PLAN:  Brian Hernandez is  an 84-year-old male patient with history noted below including hypertension, coronary artery disease, CHF, atrial fibrillation, stroke with expressive aphasia, peripheral arterial disease, dyslipidemia, chronic kidney disease and history of bladder cancer, who presents with shortness of breath with imaging revealing evidence of new bilateral pleural effusions as well as new bilateral hydronephrosis and worsened renal function.    1.  Coronary artery disease with cardiomyopathy with acute systolic congestive heart failure exacerbation:  Suspect multifactorial and may have been precipitated by worsening renal function, but cannot rule out a primary cardiac decompensation.  The patient has history of known coronary artery disease with prior stenting involving the LAD, left circumflex and RCA with most recent stenting in 2008.  Last angiogram in 2010 showed that the stents were patent, but there was some apical LAD 60-70% disease, and there was also a proximal left circumflex lesion of 75% and these were managed medically.  Echocardiogram in 05/2015 showed a EF of 45% to 50% and borderline reduced right ventricular systolic function and severe pulmonary hypertension.  This evening, the patient was hypoxic with [place] of 18383; CT did show bilateral pleural effusions and cardiomegaly, but it also did show some urologic issues which will be discussed further below.  The patient was given 40 mg intravenous Lasix.  Will have a Wilkins catheter placed and will monitor for output.  Order  echocardiogram.  Continue atorvastatin and labetalol.  We will ask Cardiology to see the patient.  Monitor Ins and Outs and daily weights.    2.  Acute kidney injury on chronic kidney disease with bilateral hydronephrosis and hydroureter noted on imaging, with history of bladder cancer:  Patient with history of bladder cancer status post radiation and chemotherapy in the past and transurethral resection of bladder tumors, most recently in 12/2016.  Follows with Dr. Porter.  Baseline creatinine noted to be around 1.2-1.4 range.  This evening, his creatinine is 1.94.  Acute injury could be cardiorenal, but given the hydronephrosis, cannot rule out obstructive process.  Will have a Wilkins catheter placed and monitor renal function closely with attempted diuresis.  If creatinine worsens tomorrow then we will hold off on further attempts at diuresis.  If improves and otherwise responds well to diuresis and creatinine stable, then could order more scheduled IV diuretic.  Will aks Urology to evaluate the patient, and will keep the patient n.p.o. after midnight.    3.  Anemia, chronic component:  The patient has history of chronic anemia dating back at least the past year.  More recently, his hemoglobins have been in the 7 range.  Hemoglobin this evening is 7.5.  Prior iron studies in 02/2017 do suggest a component of anemia of chronic disease.  We will monitor CBC.  Consider transfusion if hemoglobin is less than 7 or if acute bleeding with hemodynamic instability or if symptomatic.  Consent has been obtained from the patient's son.    4.  Benign essential hypertension:  Prior to admission regimen consists of labetalol 300 mg b.i.d.  Will continue labetalol with hold parameters.    5.  Atrial fibrillation:  Presently patient appears to be in sinus.  Not on anticoagulation or aspirin due to history of hematuria in the setting of bladder cancer.  Continue labetalol.     6.  Stroke history:  History of stroke 2 years ago with  expressive aphasia.  As above, not on any antiplatelets or anticoagulation.  Monitor clinically.     7.  Dyslipidemia:  Continue atorvastatin.    8.  Prophylaxis:  Pneumo boots and ambulation.      CODE STATUS:  Discussed with patient and son.  He is DNR/DNI.         ERIK WASHINGTON JR., MD             D: 2017 22:20   T: 2017 02:56   MT: martina      Name:     DAVID BINGHAM   MRN:      [id number]        Account:      [id number]   :      1933           Admitted:     [id number]      Document: [id number]       cc: Flip Prakash MD

## 2017-04-03 NOTE — PROGRESS NOTES
[facility]    Hospitalist Progress note  Moriah Long MD    4/3/2017       ASSESSMENT AN PLAN:  Brian Hernandez is an 84-year-old male patient with history noted below including hypertension, coronary artery disease, CHF, atrial fibrillation, stroke with expressive aphasia, peripheral arterial disease, dyslipidemia, chronic kidney disease and history of bladder cancer, who presents with shortness of breath with imaging revealing evidence of new bilateral pleural effusions as well as new bilateral hydronephrosis and worsened renal function.     1. Coronary artery disease with cardiomyopathy with acute systolic congestive heart failure exacerbation: Suspect multifactorial and may have been precipitated by worsening renal function, but cannot rule out a primary cardiac decompensation. The patient has history of known coronary artery disease with prior stenting involving the LAD, left circumflex and RCA with most recent stenting in 2008. Last angiogram in 2010 showed that the stents were patent, but there was some apical LAD 60-70% disease, and there was also a proximal left circumflex lesion of 75% and these were managed medically. Echocardiogram in 05/2015 showed a EF of 45% to 50% and borderline reduced right ventricular systolic function and severe pulmonary hypertension. The patient was hypoxic [place]NP of 16[address]; CT did show bilateral pleural effusions and cardiomegaly. The patient was given 40 mg intravenous Lasix. Wilkins catheter placed to monitor output and decompress  ----Echocardiogram was done and showed moderate global hypokinesia of the LV with EF at 37-42%, mild to moderately reduced RVSF. Sever PHTN  ----PTA atorvastatin and labetalol.   ----Cardiology consulted and input appreciated  --- switched labetalol to carvedilol and add nitrates  ---- lasix 40 mg po daily  ----Monitor Ins and Outs and daily weights.     2. Acute kidney injury on chronic kidney disease with bilateral  hydronephrosis and hydroureter noted on imaging, with history of bladder cancer: Patient with history of bladder cancer status post radiation and chemotherapy in the past and transurethral resection of bladder tumors, most recently in 12/2016. Follows with Dr. Porter. Baseline creatinine noted to be around 1.2-1.4 range.   ----Wilkins catheter placed and monitor renal function closely with attempted diuresis.  ----Follow up out put and daily Cr.  ----Urology consulted and input appreciated. No intervention indicated at the moment     3. Anemia, chronic component: The patient has history of chronic anemia dating back at least the past year. More recently, his hemoglobins have been in the 7 range. Hemoglobin this evening is 7.5. Prior iron studies in 02/2017 do suggest a component of anemia of chronic disease.  --- Will consider transfusion to keep hemoglobin around 8 as per cardiology recommendation  --- Consent has been obtained from the patient's son.     4. Benign essential hypertension: Prior to admission regimen consists of labetalol 300 mg b.i.d. Will continue labetalol with hold parameters.     5. Atrial fibrillation: Presently patient appears to be in sinus. Not on anticoagulation or aspirin due to history of hematuria in the setting of bladder cancer. Continue BB      6. Stroke history: History of stroke 2 years ago with expressive aphasia. As above, not on any antiplatelets or anticoagulation. Monitor clinically.      7. Dyslipidemia: Continue atorvastatin.     8. Prophylaxis: Pneumo boots and ambulation.       CODE STATUS: Discussed with patient and son. He is DNR/DNI.       Disposition: Depends on the clinical course.      Moriah Long MD  [facility]ist  Board certified,   Internal Medicine  Pager # [phone]  4/3/2017      SUBJECTIVE: Patient seen and examine. Report feeling better. No chest pain or shortness of breath    OBJECTIVE:    BP (P) 134/78 (BP Location: Left arm)  Pulse 74  Temp  (P) 98.1  F (36.7  C) (Oral)  Resp 18  SpO2 (P) 97%     GENERAL:  NAD.   HEENT:  Head is normocephalic and atraumatic. PERRL. EOMI. No scleral icterus. No conjunctival erythema. No nasal discharge.  Moist mucous membranes. Pharynx unremarkable.  NECK:  Supple. Non-tender. No LAD or masses.  No carotid bruits.  LUNGS:  Fine crepitations at the base of the lungs with scattered wheezing  HEART:  RRR. S1 S2. No murmurs.  ABD:  Soft, ND, NT, + BS. No masses or HSM.   SKIN:  No ulcers, rashes or lesions.   EXT:  No calf tenderness. Pulses equal throughout.   MSK:  No deformities. Edema 2+  PSYCH: Appropriate mood and affect. Judgement and thought intact.       Labs:    Most Recent 3 CBC's:  Recent Labs   Lab Test  04/03/17   0601 04/02/17 1824 02/02/17   0805 01/31/17   0855   WBC  7.9  9.4   --    --   10.5   HGB  7.4*  7.5*  7.8*   < >  8.7*   MCV  98  98   --    --   96   PLT  313  358   --    --   214    < > = values in this interval not displayed.      Most Recent 3 BMP's:  Recent Labs   Lab Test  04/03/17   0601 04/02/17 1824 02/02/17   0805 02/01/17   1305   NA  140  140   --   139   POTASSIUM  3.8  4.7   --   3.6   CHLORIDE  101  104   --   107   CO2  30  28   --   23   BUN  24  23   --   29   CR  1.99*  1.94*  1.45*  1.49*   ANIONGAP  9  8   --   9   TOR  8.3*  8.9   --   8.0*   GLC  98  118*   --   237*     Most Recent 3 Troponin's:  Recent Labs   Lab Test  04/02/17   1824  05/29/15   0005  05/28/15   1825   TROPI  0.042  0.028  0.023     Most Recent 3 INR's:  Recent Labs   Lab Test  12/09/16   1030  08/17/16   1738  05/18/10   0547   INR  1.12  1.12  1.02     Most Recent 2 LFT's:  Recent Labs   Lab Test  04/03/17   0601  04/02/17   1824   AST  508*  940*   ALT  334*  381*   ALKPHOS  97  108   BILITOTAL  0.5  0.5     Most Recent Cholesterol Panel:  Recent Labs   Lab Test  06/15/15   1519   CHOL  130   LDL  59   HDL  49   TRIG  110     Most Recent 6 Bacteria Isolates From Any Culture (See EPIC  Reports for Culture Details):  Recent Labs   Lab Test  02/14/17   1523  01/30/17   1616  01/30/17   1611  01/30/17   1505  01/02/17   2011  10/31/16   1300   CULT  No growth  No growth  >100,000 colonies/mL Proteus mirabilis*  No growth  >100,000 colonies/mL Proteus mirabilis*  <10,000 colonies/mL urogenital andi     Most Recent TSH, T4 and HgbA1c: Recent Labs   Lab Test  01/31/17   0855   05/28/15   1215  03/18/14   1006   TSH   --    --   3.04  0.07*   T4   --    --    --   1.62   A1C  6.8*   < >   --   6.2*    < > = values in this interval not displayed.       Prescriptions Prior to Admission   Medication Sig Dispense Refill Last Dose     CITALOPRAM HYDROBROMIDE PO Take 20 mg by mouth daily   4/2/2017 at 0900     Cranberry 405 MG CAPS Take 810 mg by mouth daily (2 x 405 mg capsules = 810 mg dose)   4/2/2017 at 0900     ACETAMINOPHEN PO Take 650 mg by mouth every 4 hours as needed for pain   Past Week at PRN     folic acid (FOLVITE) 1 MG tablet Take 1 tablet (1 mg) by mouth daily 30 tablet  4/2/2017 at 0900     sennosides (SENOKOT) 8.6 MG tablet Take 2 tablets by mouth 2 times daily as needed for constipation 120 each  Past Week at PRN     ferrous sulfate (IRON) 325 (65 FE) MG tablet Take 325 mg by mouth 2 times daily    4/2/2017 at 1700     polyethylene glycol (MIRALAX/GLYCOLAX) powder Take 17 g by mouth daily as needed    Past Week at PRN     labetalol (NORMODYNE) 300 MG tablet Take 1 tablet (300 mg) by mouth 2 times daily 180 tablet 0 4/2/2017 at 1700     Atorvastatin Calcium (LIPITOR PO) Take 20 mg by mouth every evening    4/2/2017 at 1700     Multiple Vitamins-Minerals (CENTRUM SILVER) per tablet Take 1 tablet by mouth daily 30 tablet  4/2/2017 at 0900     VITAMIN D, CHOLECALCIFEROL, PO Take 2,000 Units by mouth daily    4/2/2017 at 0900     blood glucose (NO BRAND SPECIFIED) lancets standard Use to test blood sugar 3 times daily or as directed. 1 Box 0 Taking     blood glucose calibration (NO BRAND  SPECIFIED) solution Use to calibrate blood glucose monitor as directed. 1 each 0 Taking     blood glucose monitoring (NO BRAND SPECIFIED) meter device kit Use to test blood sugars 3 times daily or as directed 1 kit 0 Taking     blood glucose monitoring (NO BRAND SPECIFIED) test strip Use to test blood sugars 3 times daily or as directed 100 strip 0 Taking       Scheduled medications:    carvedilol  12.5 mg Oral BID w/meals     isosorbide mononitrate  30 mg Oral Daily     furosemide  40 mg Oral Daily     sodium chloride (PF)  3 mL Intracatheter Q8H     atorvastatin (LIPITOR) tablet 20 mg  20 mg Oral QPM     citalopram (celeXA) tablet 20 mg  20 mg Oral Daily     ferrous sulfate  325 mg Oral BID     folic acid  1 mg Oral Daily     multivitamin, therapeutic with minerals  1 tablet Oral Daily     cholecalciferol  2,000 Units Oral Daily          Data   Data reviewed today:  I personally reviewed no images or EKG's today.    Recent Labs  Lab 04/03/17  0601 04/02/17  1824   WBC 7.9 9.4   HGB 7.4* 7.5*   MCV 98 98    358    140   POTASSIUM 3.8 4.7   CHLORIDE 101 104   CO2 30 28   BUN 24 23   CR 1.99* 1.94*   ANIONGAP 9 8   TOR 8.3* 8.9   GLC 98 118*   ALBUMIN 2.2* 2.4*   PROTTOTAL 6.3* 7.1   BILITOTAL 0.5 0.5   ALKPHOS 97 108   * 381*   * 940*   TROPI  --  0.042       Recent Results (from the past 24 hour(s))   Chest XR,  PA & LAT    Narrative    CHEST TWO VIEWS  4/2/2017 7:00 PM     COMPARISON: Two view chest x-ray 1/30/2017.    HISTORY: CHF.       Impression    IMPRESSION: There are new small bilateral pleural effusions and new  minimal bibasilar airspace opacity that may represent atelectasis or  pneumonia. There is increased interstitial prominence in both lungs  that may represent early pulmonary edema. There is moderate  cardiomegaly.     EVETTE ROMERO MD   CT Abdomen Pelvis w/o Contrast    Narrative    CT ABDOMEN PELVIS WITHOUT CONTRAST 4/2/2017 7:52 PM     HISTORY: Abdominal pain, RUQ.      COMPARISON: CT chest abdomen pelvis 11/1/2016.    TECHNIQUE: Axial images are obtained from the lung bases to the  symphysis without oral or IV contrast. Coronal reformatted images are  also generated.  Radiation dose for this scan was reduced using  automated exposure control, adjustment of the mA and/or kV according  to patient size, or iterative reconstruction technique.    FINDINGS:     A small right and trace left pleural effusion are noted. These are new  since the prior exam. Lung base atelectasis and cardiomegaly are also  noted.    Abdomen: Calcified granulomas are noted in the spleen which is  otherwise normal in size. The liver, pancreas and adrenal glands are  unremarkable. Gallbladder is nondistended but the margins are slightly  ill-defined possibly indicating gallbladder wall inflammation. No  enlarged lymph nodes. Aorta is tortuous and calcified. No evidence of  aneurysm. The bowel is normal in caliber without obstruction. Appendix  is normal. Possible constipation but no evidence of diverticulitis.  The kidneys bilaterally demonstrate mild hydronephrosis and  hydroureter. Pelvis is obscured by artifact from bilateral hip  replacements therefore distally obstructing stones or prostate  enlargement is difficult to rule out. These findings are new since the  prior CT.    Pelvis: The majority of the pelvis is obscured by artifact from  bilateral hip replacements. Bladder is partially decompressed. Rectum  is grossly unremarkable. No significant free pelvic fluid or pelvic  lymph node enlargement. Degenerative spine changes are present.      Impression    IMPRESSION:  1. New bilateral pleural effusions, right larger than left and stable  cardiomegaly.  2. New bilateral hydronephrosis and hydroureter. Questionable enlarged  prostate gland. Distal ureters and bladder are obscured by artifact  from the hip replacements.  3. Possible constipation but no evidence of bowel obstruction or  diverticulitis.  Appendix is normal.  4. Gallbladder is nondistended but the wall is slightly indistinct.  Follow-up ultrasound of the gallbladder if clinically warranted for  further assessment. No calcified gallstones are appreciated.     SANTY MATHEW MD   Abdomen US, limited (RUQ only)    Narrative    US ABDOMEN LIMITED 4/2/2017 9:16 PM     HISTORY: Abnormal appearing gallbladder on CT performed earlier today.      COMPARISON: CT abdomen pelvis 4/2/2017.    FINDINGS:  Liver is normal in echogenicity without focal lesions. The  gallbladder is normal without stones or sludge. Common bile duct is  normal in diameter. Pancreas is normal where visualized. Examination  of the right kidney demonstrates a cyst in the lower pole not  appreciated on the prior CT and measures 1.1 cm in diameter. Right  hydronephrosis is again noted. No obvious renal calculi. Incidental  right pleural effusion as described on prior CT.      Impression    IMPRESSION:    1. No evidence of gallstones or bile duct dilatation.  2. Right hydronephrosis and lower pole renal cyst as described. No  obvious renal calculi.     SANTY MATHEW MD

## 2017-04-03 NOTE — DOWNTIME EVENT NOTE
The EMR was down for hours on 4/2/2017.    Fela Marley RN & Dr Yarbrough was responsible for completing the paper charting during this time period.     The following information was re-entered into the system by Fela Marley: Allergies and Flowsheet data, orders so far at care handoff at 1900.    The following information will remain in the paper chart:     Fela Marley  4/2/2017

## 2017-04-03 NOTE — PLAN OF CARE
Problem: Goal Outcome Summary  Goal: Goal Outcome Summary  Outcome: No Change  A&O with forgetfulness, pt has expressive aphasia, VSS, stable on 3L NC, tele is SR, pt denies having chest pains. Pt has R) sided weakness from hx of CVA. Pt is incontinent of bladder; had 3 heavily saturated briefs this shift. Pt is currently NPO with plan for cardiology and urology consult today. UA order in place; however pt is incontinent of bladder. Continue to monitor.    Update:    Lab just informed writer that she found pt's LFA IV line disconnected. IV was placed early this AM. IV D/Cd. Site is covered with a gauze. Pt has a working IV access to his AC that was placed in the ED.    Elissa Dc RN  4/3/17 [id number]

## 2017-04-03 NOTE — PLAN OF CARE
Problem: Goal Outcome Summary  Goal: Goal Outcome Summary  Outcome: No Change  Disoriented to situation. 97% on 3L. R side weakness w/ expressive aphasia. ECHO done today 37-42%. Started on carvedilol, isosorbide and Lasix. Possible hospice consult. Continue to monitor.

## 2017-04-04 NOTE — PROGRESS NOTES
[facility]    Hospitalist Progress note  Moriah Long MD    4/4/2017       ASSESSMENT AN PLAN:  Brian Hernandez is an 84-year-old male patient with history noted below including hypertension, coronary artery disease, CHF, atrial fibrillation, stroke with expressive aphasia, peripheral arterial disease, dyslipidemia, chronic kidney disease and history of bladder cancer, who presents with shortness of breath with imaging revealing evidence of new bilateral pleural effusions as well as new bilateral hydronephrosis and worsened renal function.     1. Coronary artery disease with cardiomyopathy with acute systolic congestive heart failure exacerbation: Suspect multifactorial and may have been precipitated by worsening renal function, but cannot rule out a primary cardiac decompensation. The patient has history of known coronary artery disease with prior stenting involving the LAD, left circumflex and RCA with most recent stenting in 2008. Last angiogram in 2010 showed that the stents were patent, but there was some apical LAD 60-70% disease, and there was also a proximal left circumflex lesion of 75% and these were managed medically. Echocardiogram in 05/2015 showed a EF of 45% to 50% and borderline reduced right ventricular systolic function and severe pulmonary hypertension. The patient was hypoxic with [place] 5[address]; CT did show bilateral pleural effusions and cardiomegaly. The patient was given 40 mg intravenous Lasix. Wilkins catheter placed to monitor output and decompress  ----Echocardiogram was done and showed moderate global hypokinesia of the LV with EF at 37-42%, mild to moderately reduced RVSF. Sever PHTN  ----PTA atorvastatin and labetalol.   ----Cardiology consulted and input appreciated  --- switched labetalol to carvedilol and add nitrates  ---- lasix 40 mg po daily  ----Monitor Ins and Outs and daily weights.     2. Acute kidney injury on chronic kidney disease with bilateral  hydronephrosis and hydroureter noted on imaging, with history of bladder cancer: Patient with history of bladder cancer status post radiation and chemotherapy in the past and transurethral resection of bladder tumors, most recently in 12/2016. Follows with Dr. Porter. Baseline creatinine noted to be around 1.2-1.4 range.   ----Wilkins catheter placed and monitor renal function closely with attempted diuresis.  ----Follow up out put and daily Cr.  ----Urology consulted and input appreciated. No intervention indicated at the moment     3. Anemia, chronic component: The patient has history of chronic anemia dating back at least the past year. More recently, his hemoglobins have been in the 7 range. Hemoglobin this evening is 7.5. Prior iron studies in 02/2017 do suggest a component of anemia of chronic disease.  --- Consent has been obtained from the patient's son.  --- Will give one unit of PRBC  --- Follow up cbc in am.     4. Benign essential hypertension: Prior to admission regimen consists of labetalol 300 mg b.i.d. Will continue labetalol with hold parameters.     5. Atrial fibrillation: Presently patient appears to be in sinus. Not on anticoagulation or aspirin due to history of hematuria in the setting of bladder cancer. Continue BB      6. Stroke history: History of stroke 2 years ago with expressive aphasia. As above, not on any antiplatelets or anticoagulation. Monitor clinically.      7. Dyslipidemia: Continue atorvastatin.     8. Prophylaxis: Pneumo boots and ambulation.       CODE STATUS: Discussed with patient and son. He is DNR/DNI.       Disposition: Depends on the clinical course.      Moriah Long MD  [facility]ist  Board certified,   Internal Medicine  Pager # [phone]  4/4/2017      SUBJECTIVE: Patient seen and examine. Report feeling fatigued and tired.     OBJECTIVE:    BP (P) 125/72 (BP Location: Left arm)  Pulse 63  Temp (P) 98  F (36.7  C) (Oral)  Resp (P) 18  Wt 67.5 kg  (148 lb 13 oz)  SpO2 (P) 93%  BMI 24.02 kg/m2     GENERAL:  NAD.   HEENT:  Head is normocephalic and atraumatic. PERRL. EOMI. No scleral icterus. No conjunctival erythema. No nasal discharge.  Moist mucous membranes. Pharynx unremarkable.  NECK:  Supple. Non-tender. No LAD or masses.  No carotid bruits.  LUNGS:  Fine crepitations at the base of the lungs with scattered wheezing  HEART:  RRR. S1 S2. No murmurs.  ABD:  Soft, ND, NT, + BS. No masses or HSM.   SKIN:  No ulcers, rashes or lesions.   EXT:  No calf tenderness. Pulses equal throughout.   MSK:  No deformities. Edema 2+  PSYCH: Appropriate mood and affect. Judgement and thought intact.       Labs:    Most Recent 3 CBC's:  Recent Labs   Lab Test  04/03/17   0601  04/02/17   1824  02/02/17   0805   01/31/17   0855   WBC  7.9  9.4   --    --   10.5   HGB  7.4*  7.5*  7.8*   < >  8.7*   MCV  98  98   --    --   96   PLT  313  358   --    --   214    < > = values in this interval not displayed.      Most Recent 3 BMP's:  Recent Labs   Lab Test  04/04/17   1230  04/03/17   0601  04/02/17   1824   NA  139  140  140   POTASSIUM  3.5  3.8  4.7   CHLORIDE  100  101  104   CO2  32  30  28   BUN  24  24  23   CR  2.05*  1.99*  1.94*   ANIONGAP  7  9  8   TOR  8.8  8.3*  8.9   GLC  142*  98  118*     Most Recent 3 Troponin's:  Recent Labs   Lab Test  04/02/17   1824  05/29/15   0005  05/28/15   1825   TROPI  0.042  0.028  0.023     Most Recent 3 INR's:  Recent Labs   Lab Test  12/09/16   1030  08/17/16   1738  05/18/10   0547   INR  1.12  1.12  1.02     Most Recent 2 LFT's:  Recent Labs   Lab Test  04/03/17   0601  04/02/17   1824   AST  508*  940*   ALT  334*  381*   ALKPHOS  97  108   BILITOTAL  0.5  0.5     Most Recent Cholesterol Panel:  Recent Labs   Lab Test  06/15/15   1519   CHOL  130   LDL  59   HDL  49   TRIG  110     Most Recent 6 Bacteria Isolates From Any Culture (See EPIC Reports for Culture Details):  Recent Labs   Lab Test  02/14/17   1523  01/30/17    1616  01/30/17   1611  01/30/17   1505  01/02/17   2011  10/31/16   1300   CULT  No growth  No growth  >100,000 colonies/mL Proteus mirabilis*  No growth  >100,000 colonies/mL Proteus mirabilis*  <10,000 colonies/mL urogenital andi     Most Recent TSH, T4 and HgbA1c:   Recent Labs   Lab Test  01/31/17   0855   05/28/15   1215  03/18/14   1006   TSH   --    --   3.04  0.07*   T4   --    --    --   1.62   A1C  6.8*   < >   --   6.2*    < > = values in this interval not displayed.       Prescriptions Prior to Admission   Medication Sig Dispense Refill Last Dose     CITALOPRAM HYDROBROMIDE PO Take 20 mg by mouth daily   4/2/2017 at 0900     Cranberry 405 MG CAPS Take 810 mg by mouth daily (2 x 405 mg capsules = 810 mg dose)   4/2/2017 at 0900     ACETAMINOPHEN PO Take 650 mg by mouth every 4 hours as needed for pain   Past Week at PRN     folic acid (FOLVITE) 1 MG tablet Take 1 tablet (1 mg) by mouth daily 30 tablet  4/2/2017 at 0900     sennosides (SENOKOT) 8.6 MG tablet Take 2 tablets by mouth 2 times daily as needed for constipation 120 each  Past Week at PRN     ferrous sulfate (IRON) 325 (65 FE) MG tablet Take 325 mg by mouth 2 times daily    4/2/2017 at 1700     polyethylene glycol (MIRALAX/GLYCOLAX) powder Take 17 g by mouth daily as needed    Past Week at PRN     labetalol (NORMODYNE) 300 MG tablet Take 1 tablet (300 mg) by mouth 2 times daily 180 tablet 0 4/2/2017 at 1700     Atorvastatin Calcium (LIPITOR PO) Take 20 mg by mouth every evening    4/2/2017 at 1700     Multiple Vitamins-Minerals (CENTRUM SILVER) per tablet Take 1 tablet by mouth daily 30 tablet  4/2/2017 at 0900     VITAMIN D, CHOLECALCIFEROL, PO Take 2,000 Units by mouth daily    4/2/2017 at 0900     blood glucose (NO BRAND SPECIFIED) lancets standard Use to test blood sugar 3 times daily or as directed. 1 Box 0 Taking     blood glucose calibration (NO BRAND SPECIFIED) solution Use to calibrate blood glucose monitor as directed. 1 each 0 Taking      blood glucose monitoring (NO BRAND SPECIFIED) meter device kit Use to test blood sugars 3 times daily or as directed 1 kit 0 Taking     blood glucose monitoring (NO BRAND SPECIFIED) test strip Use to test blood sugars 3 times daily or as directed 100 strip 0 Taking       Scheduled medications:    carvedilol  12.5 mg Oral BID w/meals     isosorbide mononitrate  30 mg Oral Daily     furosemide  40 mg Oral Daily     sodium chloride (PF)  3 mL Intracatheter Q8H     atorvastatin (LIPITOR) tablet 20 mg  20 mg Oral QPM     citalopram (celeXA) tablet 20 mg  20 mg Oral Daily     ferrous sulfate  325 mg Oral BID     folic acid  1 mg Oral Daily     multivitamin, therapeutic with minerals  1 tablet Oral Daily     cholecalciferol  2,000 Units Oral Daily       I/O last 3 completed shifts:  In: 3 [I.V.:3]  Out: -   Data   Data reviewed today:  I personally reviewed no images or EKG's today.    Recent Labs  Lab 04/04/17  1230 04/03/17  0601 04/02/17  1824   WBC  --  7.9 9.4   HGB  --  7.4* 7.5*   MCV  --  98 98   PLT  --  313 358    140 140   POTASSIUM 3.5 3.8 4.7   CHLORIDE 100 101 104   CO2 32 30 28   BUN 24 24 23   CR 2.05* 1.99* 1.94*   ANIONGAP 7 9 8   TOR 8.8 8.3* 8.9   * 98 118*   ALBUMIN  --  2.2* 2.4*   PROTTOTAL  --  6.3* 7.1   BILITOTAL  --  0.5 0.5   ALKPHOS  --  97 108   ALT  --  334* 381*   AST  --  508* 940*   TROPI  --   --  0.042       No results found for this or any previous visit (from the past 24 hour(s)).

## 2017-04-04 NOTE — PROGRESS NOTES
BP (!) 143/97  Pulse 63  Temp 97.9  F (36.6  C) (Oral)  Resp 18  Wt 67.5 kg (148 lb 13 oz)  SpO2 99%  BMI 24.02 kg/m2  Seen in follow-up consultation this morning.  The situation is largely unchanged.  Creatinine   Date Value Ref Range Status   04/03/2017 1.99 (H) 0.66 - 1.25 mg/dL Final   ]  Most recent creatinine was 1.99, mild hydronephrosis observed.  At this time we do not see an indication for stent placement as noted yesterday but will follow with the staff here and we'll be very happy for further consultation should the possibility of drainage of the kidneys be contemplated.  I did carefully discussed the situation with staff this morning

## 2017-04-04 NOTE — PLAN OF CARE
Problem: Goal Outcome Summary  Goal: Goal Outcome Summary  Outcome: No Change  Pt A/O, VSS on 3L NC. Tele: SR. Pt incontinent of urine. Turned/repositioned. Denies pain/SOB. LS diminished. Plan for hospice meeting today or tomorrow.

## 2017-04-04 NOTE — PROGRESS NOTES
SWS PROGRESS NOTE:     I: SW was able to coordinate  Hospice mtg with pt and her youngest son, Eladio, for 1300 today.  Hospice staff will be present for mtg.   P: SW will wait for  Hospice to discuss DC options and then SW will assist with DC planning, as needed.     Michelle Acevedo, MSW, LGSW *[phone]    UPDATE 1400:  DORIAN met with Sandy Severin from [facility]. Pt's family is concerned with finances and have expressed a desire to consider other, more financially considerate options for pt's placement. Linda brainstormed with pt's family about potential for pt to participate in PT/OT at [facility]; this would be covered under his Medicare benefits and then pt may be able to return to [place] with less assistance, in turn costing less money. Other options discussed included pt's inability or lack of desire to participate in therapies which would then render him unable to have Medicare coverage at [facility]. Per Linda, PT came to assess pt during the  Hospice meeting and they said they would return at a later date. If pt is assessed and deemed an appropriate candidate for TCU, this option could be discussed further. At this time, it is likely that pt would benefit most from returning to [facility] with [facility]. Pt's family could then discuss options for pt's future placement, should finances become a further issue;  Hospice would then be available to support the family through that transition.   At this time, DORIAN will plan for pt to return to [facility] [facility] with  Hospice unless pt is assessed as appropriate for TCU by PT.

## 2017-04-04 NOTE — CONSULTS
Writer met with pt, wife and son Eladio in pt room to discuss hospice services.   Pt son questioning pt prognosis and treatment plan in regards to transfusions. HE also questions if pleural effusions have resolved or what plans are for this.   Pt did not offer any questions or concerns during conversation.  He did not answer any questions.  The family  state that they want to focus on comfort and symptom management upon discharge.  Biggest concerns are financial.   Pt was at [facility] assisted living prior to hospitalization.  Plan is for pt to discharge back to [facility] when ready but will need to discuss long term plans due to finances.    Hospice will meet pt wife at her home to have consents signed when pt is discharged.  Will need to know if pt will be on oxygen, if he needs a hospital bed, commode, wheelchair upon discharge so hospice can order it to be at [facility] when pt arrives.  Please order hospice comfort meds upon discharge with exception of haldol.  No ranges on comfort meds and they will need to be bubble packed for sunrise.  Morphine or dilaudid will need to be in tablet form. Please contact hospice when discharge plan is known.  Thank you for this referral  [phone].  Sandra Severin RN

## 2017-04-04 NOTE — PLAN OF CARE
AOx4, VSS, Tele SR HR 66. Incontinent of urine. Skin intact, lungs diminished throughout. A1, Limited mobility due to residual from stroke 2 yrs ago (right sided weakness). CTM.

## 2017-04-04 NOTE — PLAN OF CARE
Problem: Goal Outcome Summary  Goal: Goal Outcome Summary  OT/Cardiac Rehab: Received order.  Noted plan for hospice meeting today at 1300.  Discussed with RN, will defer eval until hospice meeting establishes pt/family wishes and goals of care.  RN in agreement.    Addendum: Noted plan for discharge back to [facility] with hospice.  Will sign off.

## 2017-04-04 NOTE — PLAN OF CARE
Problem: Goal Outcome Summary  Goal: Goal Outcome Summary  Outcome: No Change  A&O but forgetful. 95% on 3L. R sided weakness. Hospice consult done today. Hg draw in the am. D/c to [place] when d/c is known. Incontinent. Continue to monitor.

## 2017-04-04 NOTE — PROGRESS NOTES
[facility]    Cardiology Progress Note    Brian Hernandez is a 84 year old male who was admitted on 4/2/2017.      Assessment & Plan     A: 1)Relatively stable cardiac condition         2)ASCVD-decreased LV function with ischemic cardiomyopathy      3)Renal insufficiency      4)Anemia    P:  1)Telemetry       2)Decide disposition-?hospice/comfort care       3)Continue coreg +/- diuretic Rx       4)Consider 1-2 units pRBC transfusion  Time Spent: < than 30 minutes of medical management with >50% counseling    Eladio Montano MD [facility]    Interval History   S:   Resting comfortably; without chest discomfort,SOB    Physical Exam   Vitals: Blood pressure 142/81, pulse 63, temperature 98.5  F (36.9  C), temperature source Oral, resp. rate 18, weight 67.5 kg (148 lb 13 oz), SpO2 93 %., Heart Rate: 70, Wt Readings from Last 4 Encounters:   04/04/17 67.5 kg (148 lb 13 oz)   02/14/17 70.8 kg (156 lb)   02/06/17 72.1 kg (159 lb)   01/30/17 69.6 kg (153 lb 6.4 oz)       I/O last 3 completed shifts:  In: 243 [P.O.:240; I.V.:3]  Out: -     Lungs:  Decreased BS at bases with crackles  Heart: RR,1-2/6 systolic murmur  Extremities: trace-1+ edema      Medications     NaCl Stopped (04/02/17 1968)     - MEDICATION INSTRUCTIONS -         carvedilol  12.5 mg Oral BID w/meals     isosorbide mononitrate  30 mg Oral Daily     furosemide  40 mg Oral Daily     sodium chloride (PF)  3 mL Intracatheter Q8H     atorvastatin (LIPITOR) tablet 20 mg  20 mg Oral QPM     citalopram (celeXA) tablet 20 mg  20 mg Oral Daily     ferrous sulfate  325 mg Oral BID     folic acid  1 mg Oral Daily     multivitamin, therapeutic with minerals  1 tablet Oral Daily     cholecalciferol  2,000 Units Oral Daily          All laboratory data reviewed  ROUTINE IP LABS (Last four results)  BMP  Recent Labs  Lab 04/04/17  1230 04/03/17  0601 04/02/17  1824    140 140   POTASSIUM 3.5 3.8 4.7   CHLORIDE 100 101 104   TOR 8.8 8.3* 8.9   CO2 32 30 28    BUN 24 24 23   CR 2.05* 1.99* 1.94*   * 98 118*     CBC  Recent Labs  Lab 04/03/17  0601 04/02/17  1824   WBC 7.9 9.4   RBC 2.45* 2.52*   HGB 7.4* 7.5*   HCT 23.9* 24.6*   MCV 98 98   MCH 30.2 29.8   MCHC 31.0* 30.5*   RDW 16.3* 16.2*    358     INRNo lab results found in last 7 days. Lab Results   Component Value Date    TROPI 0.042 04/02/2017    TROPI 0.028 05/29/2015    TROPI 0.023 05/28/2015    TROPI 0.032 05/28/2015    TROPI <0.012 03/10/2014         EKG results:  Reviewed if available.   Performed on 4/42017        Results:  Sinus rhythm      Cardiac Imaging:   ECHO: decreased LV function with EF 35-40% and decreased RV function

## 2017-04-05 NOTE — PROGRESS NOTES
[facility]    Hospitalist Progress note  Moriah Long MD    4/5/2017       ASSESSMENT AN PLAN:  Brian Hernandez is an 84-year-old male patient with history noted below including hypertension, coronary artery disease, CHF, atrial fibrillation, stroke with expressive aphasia, peripheral arterial disease, dyslipidemia, chronic kidney disease and history of bladder cancer, who presents with shortness of breath with imaging revealing evidence of new bilateral pleural effusions as well as new bilateral hydronephrosis and worsened renal function.     1. Coronary artery disease with cardiomyopathy with acute systolic congestive heart failure exacerbation: Suspect multifactorial and may have been precipitated by worsening renal function, but cannot rule out a primary cardiac decompensation. The patient has history of known coronary artery disease with prior stenting involving the LAD, left circumflex and RCA with most recent stenting in 2008. Last angiogram in 2010 showed that the stents were patent, but there was some apical LAD 60-70% disease, and there was also a proximal left circumflex lesion of 75% and these were managed medically. Echocardiogram in 05/2015 showed a EF of 45% to 50% and borderline reduced right ventricular systolic function and severe pulmonary hypertension. The patient was [place] 9[address]; CT did show bilateral pleural effusions and cardiomegaly. The patient was given 40 mg intravenous Lasix. Wilkins catheter placed to monitor output and decompress  ----Echocardiogram was done and showed moderate global hypokinesia of the LV with EF at 37-42%, mild to moderately reduced RVSF. Sever PHTN  ----PTA atorvastatin and labetalol.   ----Cardiology consulted and input appreciated  --- switched labetalol to carvedilol and add nitrates  ---- Lasix 40 mg po daily and will hold it for now as Cr up to 2.05  ----Monitor Ins and Outs and daily weights.     2. Acute kidney  injury on chronic kidney disease with bilateral hydronephrosis and hydroureter noted on imaging, with history of bladder cancer: Patient with history of bladder cancer status post radiation and chemotherapy in the past and transurethral resection of bladder tumors, most recently in 12/2016. Follows with Dr. Porter. Baseline creatinine noted to be around 1.2-1.4 range.   ----Wilkins catheter was placed and monitored renal function closely with attempted diuresis.  ----Urology consulted and input appreciated. No intervention indicated at the moment     3. Anemia, chronic component: The patient has history of chronic anemia dating back at least the past year. More recently, his hemoglobins have been in the 7 range. Hemoglobin this evening is 7.5. Prior iron studies in 02/2017 do suggest a component of anemia of chronic disease.  --- S/P one unit of PRBC on 4/4/17  --- Hgb up to 9.  --- Continue po iron supplement     4. Benign essential hypertension: Prior to admission regimen consists of labetalol 300 mg b.i.d. Will continue labetalol with hold parameters.     5. Atrial fibrillation: Presently patient appears to be in sinus. Not on anticoagulation or aspirin due to history of hematuria in the setting of bladder cancer. Continue BB      6. Stroke history: History of stroke 2 years ago with expressive aphasia. As above, not on any antiplatelets or anticoagulation. Monitor clinically.      7. Dyslipidemia: Continue atorvastatin.     8. Prophylaxis: Pneumo boots and ambulation.       CODE STATUS: Discussed with patient and son. He is DNR/DNI.       Disposition: TCU likely tomorrow    Today: will check TSH. Hold diuretics. PT eval. Follow up BMP in AM    Moriah Long MD  [facility]  Board certified,   Internal Medicine  Pager # [phone]  4/5/2017      SUBJECTIVE: Patient seen and examine. Report feeling fatigued and tired. S/p one unit PRBC.     OBJECTIVE:    /88 (BP Location: Left arm)  Pulse 63   Temp 98.4  F (36.9  C) (Oral)  Resp 16  Wt 67.2 kg (148 lb 2.4 oz)  SpO2 98%  BMI 23.91 kg/m2     GENERAL:  NAD.   HEENT:  Head is normocephalic and atraumatic. PERRL. EOMI. No scleral icterus. No conjunctival erythema. No nasal discharge.  Moist mucous membranes. Pharynx unremarkable.  NECK:  Supple. Non-tender. No LAD or masses.  No carotid bruits.  LUNGS:  Fine crepitations at the base of the lungs with scattered wheezing  HEART:  RRR. S1 S2. No murmurs.  ABD:  Soft, ND, NT, + BS. No masses or HSM.   SKIN:  No ulcers, rashes or lesions.   EXT:  No calf tenderness. Pulses equal throughout.   MSK:  No deformities. Edema 2+  PSYCH: Appropriate mood and affect. Judgement and thought intact.       Labs:    Most Recent 3 CBC's:  Recent Labs   Lab Test  04/05/17   0615  04/03/17   0601  04/02/17   1824   WBC  11.0  7.9  9.4   HGB  9.1*  7.4*  7.5*   MCV  96  98  98   PLT  337  313  358      Most Recent 3 BMP's:  Recent Labs   Lab Test  04/04/17   1230  04/03/17   0601  04/02/17   1824   NA  139  140  140   POTASSIUM  3.5  3.8  4.7   CHLORIDE  100  101  104   CO2  32  30  28   BUN  24  24  23   CR  2.05*  1.99*  1.94*   ANIONGAP  7  9  8   TOR  8.8  8.3*  8.9   GLC  142*  98  118*     Most Recent 3 Troponin's:  Recent Labs   Lab Test  04/02/17   1824  05/29/15   0005  05/28/15   1825   TROPI  0.042  0.028  0.023     Most Recent 3 INR's:  Recent Labs   Lab Test  12/09/16   1030  08/17/16   1738  05/18/10   0547   INR  1.12  1.12  1.02     Most Recent 2 LFT's:  Recent Labs   Lab Test  04/03/17   0601  04/02/17   1824   AST  508*  940*   ALT  334*  381*   ALKPHOS  97  108   BILITOTAL  0.5  0.5     Most Recent Cholesterol Panel:  Recent Labs   Lab Test  06/15/15   1519   CHOL  130   LDL  59   HDL  49   TRIG  110     Most Recent 6 Bacteria Isolates From Any Culture (See EPIC Reports for Culture Details):  Recent Labs   Lab Test  02/14/17   1523  01/30/17   1616  01/30/17   1611  01/30/17   1505  01/02/17 2011   10/31/16   1300   CULT  No growth  No growth  >100,000 colonies/mL Proteus mirabilis*  No growth  >100,000 colonies/mL Proteus mirabilis*  <10,000 colonies/mL urogenital andi     Most Recent TSH, T4 and HgbA1c:   Recent Labs   Lab Test  01/31/17   0855   05/28/15   1215  03/18/14   1006   TSH   --    --   3.04  0.07*   T4   --    --    --   1.62   A1C  6.8*   < >   --   6.2*    < > = values in this interval not displayed.       Prescriptions Prior to Admission   Medication Sig Dispense Refill Last Dose     CITALOPRAM HYDROBROMIDE PO Take 20 mg by mouth daily   4/2/2017 at 0900     Cranberry 405 MG CAPS Take 810 mg by mouth daily (2 x 405 mg capsules = 810 mg dose)   4/2/2017 at 0900     ACETAMINOPHEN PO Take 650 mg by mouth every 4 hours as needed for pain   Past Week at PRN     folic acid (FOLVITE) 1 MG tablet Take 1 tablet (1 mg) by mouth daily 30 tablet  4/2/2017 at 0900     sennosides (SENOKOT) 8.6 MG tablet Take 2 tablets by mouth 2 times daily as needed for constipation 120 each  Past Week at PRN     ferrous sulfate (IRON) 325 (65 FE) MG tablet Take 325 mg by mouth 2 times daily    4/2/2017 at 1700     polyethylene glycol (MIRALAX/GLYCOLAX) powder Take 17 g by mouth daily as needed    Past Week at PRN     labetalol (NORMODYNE) 300 MG tablet Take 1 tablet (300 mg) by mouth 2 times daily 180 tablet 0 4/2/2017 at 1700     Atorvastatin Calcium (LIPITOR PO) Take 20 mg by mouth every evening    4/2/2017 at 1700     Multiple Vitamins-Minerals (CENTRUM SILVER) per tablet Take 1 tablet by mouth daily 30 tablet  4/2/2017 at 0900     VITAMIN D, CHOLECALCIFEROL, PO Take 2,000 Units by mouth daily    4/2/2017 at 0900     blood glucose (NO BRAND SPECIFIED) lancets standard Use to test blood sugar 3 times daily or as directed. 1 Box 0 Taking     blood glucose calibration (NO BRAND SPECIFIED) solution Use to calibrate blood glucose monitor as directed. 1 each 0 Taking     blood glucose monitoring (NO BRAND SPECIFIED) meter  device kit Use to test blood sugars 3 times daily or as directed 1 kit 0 Taking     blood glucose monitoring (NO BRAND SPECIFIED) test strip Use to test blood sugars 3 times daily or as directed 100 strip 0 Taking       Scheduled medications:    carvedilol  12.5 mg Oral BID w/meals     isosorbide mononitrate  30 mg Oral Daily     furosemide  40 mg Oral Daily     sodium chloride (PF)  3 mL Intracatheter Q8H     atorvastatin (LIPITOR) tablet 20 mg  20 mg Oral QPM     citalopram (celeXA) tablet 20 mg  20 mg Oral Daily     ferrous sulfate  325 mg Oral BID     folic acid  1 mg Oral Daily     multivitamin, therapeutic with minerals  1 tablet Oral Daily     cholecalciferol  2,000 Units Oral Daily       I/O last 3 completed shifts:  In: 840 [P.O.:240]  Out: -   Data   Data reviewed today:  I personally reviewed no images or EKG's today.    Recent Labs  Lab 04/05/17  0615 04/04/17  1230 04/03/17  0601 04/02/17  1824   WBC 11.0  --  7.9 9.4   HGB 9.1*  --  7.4* 7.5*   MCV 96  --  98 98     --  313 358   NA  --  139 140 140   POTASSIUM  --  3.5 3.8 4.7   CHLORIDE  --  100 101 104   CO2  --  32 30 28   BUN  --  24 24 23   CR  --  2.05* 1.99* 1.94*   ANIONGAP  --  7 9 8   TOR  --  8.8 8.3* 8.9   GLC  --  142* 98 118*   ALBUMIN  --   --  2.2* 2.4*   PROTTOTAL  --   --  6.3* 7.1   BILITOTAL  --   --  0.5 0.5   ALKPHOS  --   --  97 108   ALT  --   --  334* 381*   AST  --   --  508* 940*   TROPI  --   --   --  0.042       No results found for this or any previous visit (from the past 24 hour(s)).

## 2017-04-05 NOTE — PLAN OF CARE
Problem: Goal Outcome Summary  Goal: Goal Outcome Summary  Outcome: No Change  Pt VSS on 3L O2. NSR. A&O but forgetful. Up with 2. Incontinent, wearing brief, refusing pritchard. Skin integrity intact. Denies pain. Slept. Met with hospice yesterday. Continue to monitor.

## 2017-04-05 NOTE — PROGRESS NOTES
Social Work Progress Note  Pt chart reviewed. Pt discussed in interdisciplinary rounds.     Intervention: Per MD, pt to d/c tomorrow. Fernie spoke with pt's son Alo who informed sw that family have decided to sign onto hospice and have pt return to [facility]. Alo stated that he has to check in with his brother to find out if he is available to transport pt tomorrow (Per bedside RN, pt can be transported by family). Pt will need 02 during transport, currently on 3L NC. Fernie updated Kassandra at [facility] (p:[phone] f:[phone]).     Kassandra confirmed that they would prefer that pt discharges before 1500. Kassandra also confirmed that pt DMEs has been delivered by hospice. Fernie contacted pt's wife Mi and left her a voice message. Fernie spoke with [place] in hospice who stated that they will plan to have meet pt and family at [facility] at 1300. Which means pt will have to be transported around 1200. Sw to await a return call from Alo.     Team members notified: Bedside RN,& CC    Plan:  Anticipated Discharge Placement: [facility].   Barriers: None identified at this time.   Follow-up plan: Fernie to continue to follow.     VAMSHI Cheek, [facility]  [phone]    ADDENDUM:   Fernie received a call from Mi, and updated her of te above. Fernie is still awaiting a call from pt's sons.     VAMSHI Cheek, [facility]  [phone]

## 2017-04-05 NOTE — PLAN OF CARE
Problem: Goal Outcome Summary  Goal: Goal Outcome Summary  PT: Evaluation orders received, plan is for pt to discharge to [facility] with Hospice Care tomorrow. Spoke with  and RN, pt does not need an acute/inpaitent evaluation as the plan is already in place. Should the pt/family decide they would like to have PT services at discharge they can work with their Hospice team to set this up. PT order completed at this time.

## 2017-04-05 NOTE — PLAN OF CARE
Problem: Goal Outcome Summary  Goal: Goal Outcome Summary  Outcome: Improving  A&O. Neuros intact. VSS. Tele NSR.  Reg diet. Up with Ax1 and walker. Denies pain. Plan:  Awaiting dc plan

## 2017-04-05 NOTE — PLAN OF CARE
Problem: Goal Outcome Summary  Goal: Goal Outcome Summary  VSS. Pt c/o generalized weakness. Incontinent of urine. Gave 1 unit blood, pt tolerated well. Recheck Hgb in the AM. Continue to monitor.

## 2017-04-05 NOTE — PROGRESS NOTES
[facility]    Cardiology Progress Note    Brian Hernandez is a 84 year old male who was admitted on 4/2/2017.      Assessment & Plan     A: 1)Relatively stable cardiac condition         2)ASCVD- decreased LV function      3)Renal insufficiency      4)Anemia-slightly improved after transfusion    P:  1)Telemetry       2)Continue coreg, atorvastatin, isosorbide;adjust doses as BP/HR dictate       3)Disposition- TCU vs. hospice       4)Will see again upon request  Time Spent: < than 30 minutes    Eladio Montano MD [facility]    Interval History   S:   Tired, weak with out chest pain,SOB    Physical Exam   Vitals: Blood pressure 120/78, pulse 63, temperature 98.1  F (36.7  C), temperature source Oral, resp. rate 16, weight 67.2 kg (148 lb 2.4 oz), SpO2 98 %., Heart Rate: 68, Wt Readings from Last 4 Encounters:   04/05/17 67.2 kg (148 lb 2.4 oz)   02/14/17 70.8 kg (156 lb)   02/06/17 72.1 kg (159 lb)   01/30/17 69.6 kg (153 lb 6.4 oz)       I/O last 3 completed shifts:  In: 840 [P.O.:240]  Out: -     Lungs:  Decreased BS at bases with crackles  Heart: RR,S4,1-2/6 systolic murmur  Extremities: 1+ edema      Medications     NaCl Stopped (04/02/17 5747)     - MEDICATION INSTRUCTIONS -         carvedilol  12.5 mg Oral BID w/meals     isosorbide mononitrate  30 mg Oral Daily     sodium chloride (PF)  3 mL Intracatheter Q8H     atorvastatin (LIPITOR) tablet 20 mg  20 mg Oral QPM     citalopram (celeXA) tablet 20 mg  20 mg Oral Daily     ferrous sulfate  325 mg Oral BID     folic acid  1 mg Oral Daily     multivitamin, therapeutic with minerals  1 tablet Oral Daily     cholecalciferol  2,000 Units Oral Daily          All laboratory data reviewed  ROUTINE IP LABS (Last four results)  BMP  Recent Labs  Lab 04/04/17  1230 04/03/17  0601 04/02/17  1824    140 140   POTASSIUM 3.5 3.8 4.7   CHLORIDE 100 101 104   TOR 8.8 8.3* 8.9   CO2 32 30 28   BUN 24 24 23   CR 2.05* 1.99* 1.94*   * 98 118*     CBC  Recent  Labs  Lab 04/05/17  0615 04/03/17  0601 04/02/17  1824   WBC 11.0 7.9 9.4   RBC 2.96* 2.45* 2.52*   HGB 9.1* 7.4* 7.5*   HCT 28.5* 23.9* 24.6*   MCV 96 98 98   MCH 30.7 30.2 29.8   MCHC 31.9 31.0* 30.5*   RDW 16.3* 16.3* 16.2*    313 358     INRNo lab results found in last 7 days. Lab Results   Component Value Date    TROPI 0.042 04/02/2017    TROPI 0.028 05/29/2015    TROPI 0.023 05/28/2015    TROPI 0.032 05/28/2015    TROPI <0.012 03/10/2014         EKG results:  Reviewed if available.   Performed on 4/5/2017        Results:  Sinus rhythm

## 2017-04-06 NOTE — PLAN OF CARE
Problem: Goal Outcome Summary  Goal: Goal Outcome Summary  Outcome: No Change  PT A & O x4, VSS, O2 98% on 2L. Tele: NSR. Denies any pain. Cough infrequent but strong. Assist of 1 with walker. Pt continues to be incontinent but making effort to ambulate to bathroom for bowel movements. Code status: DNR/DNI. Plan is to discharge tomorrow to hospice: [facility].

## 2017-04-06 NOTE — DISCHARGE INSTRUCTIONS
Patient will discharge to [facility] with [facility] today around noon.  [facility]'s phone number is [phone].

## 2017-04-06 NOTE — PLAN OF CARE
Problem: Goal Outcome Summary  Goal: Goal Outcome Summary  Outcome: No Change  A&O. Up with 1-2 assist. Incont. Of urine. VSS. Plan to discharge to [facility] with  hospice around 12 noon.

## 2017-04-06 NOTE — DISCHARGE SUMMARY
VS stable. Tele monitor shows patient is in ST with PVC, HR of 126. Medications discussed, information reviewed, questions and concerns addressed, follow-up instructions reviewed. Pt belongings accounted for and sent home ([facility] assisted [facility]) with them. Pt left via wheelchair and driven by son.

## 2017-04-06 NOTE — DISCHARGE SUMMARY
[facility]  Discharge Summary        Brian Hernandez MRN# [id number]   YOB: 1933 Age: 84 year old     Date of Admission:  4/2/2017  Date of Discharge:  4/6/2017  Admitting Physician:  Tommy Balderas MD  Discharge Physician: Moriah Long MD  Discharging Service: Hospitalist     Primary Provider: Flip Herrera  Primary Care Physician Phone Number: [phone]         Discharge Diagnoses:         Acute combined congestive heart failure, unspecified congestive heart failure type (H)  Acute kidney failure on CKD  Elevated liver function tests  Hospice care patient  Acute on chronic iron deficiency anemia        Problem Oriented Hospital Course (Providers):    Brian Hernandez was admitted on 4/2/2017 by Tommy Balderas MD and I would refer you to their history and physical.  The following problems were addressed during his hospitalization:  1. Coronary artery disease with cardiomyopathy with acute systolic congestive heart failure exacerbation: Suspect multifactorial and may have been precipitated by worsening renal function, but cannot rule out a primary cardiac decompensation. The patient has history of known coronary artery disease with prior stenting involving the LAD, left circumflex and RCA with most recent stenting in 2008. Last angiogram in 2010 showed that the stents were patent, but there was some apical LAD 60-70% disease, and there was also a proximal left circumflex lesion of 75% and these were managed medically. Echocardiogram in 05/2015 showed a EF of 45% to 50% and borderline reduced right ventricular systolic function and severe pulmonary hypertension. The patient was hypoxic [place] 536[address]; CT did show bilateral pleural effusions and cardiomegaly. The patient was given 40 mg intravenous Lasix. Wilkins catheter placed to monitor output and decompress  ----Echocardiogram was done and showed moderate global hypokinesia of the LV with  EF at 37-42%, mild to moderately reduced RVSF. Sever PHTN  ----Cardiology consulted and followed  --- switched labetalol to carvedilol and add nitrates      2. Acute kidney injury on chronic kidney disease with bilateral hydronephrosis and hydroureter noted on imaging, with history of bladder cancer: Patient with history of bladder cancer status post radiation and chemotherapy in the past and transurethral resection of bladder tumors, most recently in 12/2016. Follows with Dr. Porter. Baseline creatinine noted to be around 1.2-1.4 range.   ----Wilkins catheter was placed and monitored renal function closely with attempted diuresis.  ----Urology consulted and no intervention indicated at the moment      3. Anemia, chronic component: The patient has history of chronic anemia dating back at least the past year. More recently, his hemoglobins have been in the 7 range. Hemoglobin this evening is 7.5. Prior iron studies in 02/2017 do suggest a component of anemia of chronic disease.  --- S/P one unit of PRBC on 4/4/17  --- Hgb up to 9.  --- Continue po iron supplement      4. Benign essential hypertension: Prior to admission regimen consists of labetalol 300 mg bid was changed to metoprolol per cardiology recommendation.       5. Atrial fibrillation: stable.      6. Stroke history: History of stroke 2 years ago with expressive aphasia. As above, not on any antiplatelets or anticoagulation. Monitor clinically.      Patient was seen prior to discharge. He was found to be stable. Patient will be discharged with home hospice care.           Code Status:      DNR / DNI         Important Results:      See below.         Pending Results:        Unresulted Labs Ordered in the Past 30 Days of this Admission     No orders found from 2/1/2017 to 4/3/2017.               Discharge Instructions and Follow-Up:               Discharge Disposition:      Discharged to home         Discharge Medications:        Current Discharge Medication  List      START taking these medications    Details   carvedilol (COREG) 12.5 MG tablet Take 1 tablet (12.5 mg) by mouth 2 times daily (with meals)  Qty: 60 tablet, Refills: 0    Associated Diagnoses: Acute congestive heart failure, unspecified congestive heart failure type (H)      MEDICATION INSTRUCTION If care facility cannot accept or use ranges, facility is instructed to use lower end of dosing range    Associated Diagnoses: Hospice care patient      morphine sulfate HIGH CONCENTRATE (ROXANOL *CONCENTRATED*) 100 MG/5ML (HIGH CONC) solution Take 0.125 mLs (2.5 mg) by mouth or place under tongue every 2 hours as needed for moderate to severe pain (and/or shortness of breath.)  Qty: 30 mL, Refills: 0    Associated Diagnoses: Hospice care patient      atropine 1 % ophthalmic solution Take 2 drops by mouth, place under tongue or place inside cheek every 2 hours as needed for other (terminal respiratory secretions) Not for ophthalmic use.  Qty: 5 mL, Refills: 1    Associated Diagnoses: Hospice care patient      LORazepam (ATIVAN) 2 MG/ML (HIGH CONC) solution Take 0.25 mLs (0.5 mg) by mouth, place under tongue or insert rectally every 4 hours as needed for anxiety (restlessness)  Qty: 30 mL, Refills: 1    Associated Diagnoses: Hospice care patient      haloperidol (HALDOL) 2 MG/ML (HIGH CONC) solution Take 1 mL (2 mg) by mouth, place under tongue or insert rectally every 6 hours as needed for agitation (nausea)  Qty: 30 mL, Refills: 1    Associated Diagnoses: Hospice care patient      bisacodyl (DULCOLAX) 10 MG Suppository Unwrap and insert 1 suppository rectally twice daily as needed for constipation.  Qty: 12 suppository, Refills: 1    Associated Diagnoses: Hospice care patient      acetaminophen (TYLENOL) 650 MG Suppository Place 1 suppository (650 mg) rectally every 4 hours as needed for fever or mild pain (Do not exceed 4000 mg total acetaminophen per day.) Unwrap prior to insertion.  Qty: 12 suppository,  Refills: 1    Associated Diagnoses: Hospice care patient         CONTINUE these medications which have NOT CHANGED    Details   CITALOPRAM HYDROBROMIDE PO Take 20 mg by mouth daily      Cranberry 405 MG CAPS Take 810 mg by mouth daily (2 x 405 mg capsules = 810 mg dose)      ACETAMINOPHEN PO Take 650 mg by mouth every 4 hours as needed for pain      folic acid (FOLVITE) 1 MG tablet Take 1 tablet (1 mg) by mouth daily  Qty: 30 tablet    Associated Diagnoses: Iron deficiency anemia due to chronic blood loss      sennosides (SENOKOT) 8.6 MG tablet Take 2 tablets by mouth 2 times daily as needed for constipation  Qty: 120 each    Associated Diagnoses: Constipation, unspecified constipation type      ferrous sulfate (IRON) 325 (65 FE) MG tablet Take 325 mg by mouth 2 times daily       polyethylene glycol (MIRALAX/GLYCOLAX) powder Take 17 g by mouth daily as needed       Multiple Vitamins-Minerals (CENTRUM SILVER) per tablet Take 1 tablet by mouth daily  Qty: 30 tablet      VITAMIN D, CHOLECALCIFEROL, PO Take 2,000 Units by mouth daily       blood glucose (NO BRAND SPECIFIED) lancets standard Use to test blood sugar 3 times daily or as directed.  Qty: 1 Box, Refills: 0    Associated Diagnoses: Type 2 diabetes mellitus with hyperosmolarity without coma, without long-term current use of insulin (H)      blood glucose calibration (NO BRAND SPECIFIED) solution Use to calibrate blood glucose monitor as directed.  Qty: 1 each, Refills: 0    Associated Diagnoses: Type 2 diabetes mellitus with hyperosmolarity without coma, without long-term current use of insulin (H)      blood glucose monitoring (NO BRAND SPECIFIED) meter device kit Use to test blood sugars 3 times daily or as directed  Qty: 1 kit, Refills: 0    Associated Diagnoses: Type 2 diabetes mellitus with hyperosmolarity without coma, without long-term current use of insulin (H)      blood glucose monitoring (NO BRAND SPECIFIED) test strip Use to test blood sugars 3  times daily or as directed  Qty: 100 strip, Refills: 0    Associated Diagnoses: Type 2 diabetes mellitus with hyperosmolarity without coma, without long-term current use of insulin (H)         STOP taking these medications       labetalol (NORMODYNE) 300 MG tablet Comments:   Reason for Stopping:         Atorvastatin Calcium (LIPITOR PO) Comments:   Reason for Stopping:                    Allergies:       No Known Allergies         Consultations This Hospital Stay:      Consultation during this admission received from cardiology         Condition and Physical Exam on Discharge:      Discharge condition: Stable   Discharge vitals: Heart Rate: 117, Blood pressure (!) 151/95, pulse 63, temperature 99.2  F (37.3  C), temperature source Oral, resp. rate 18, weight 67.7 kg (149 lb 4 oz), SpO2 92 %.       Constitutional: Awake, alert. No apparent distress   Lungs: Clear to auscultation bilaterally, no crackles or wheezing   Cardiovascular: Regular HR, normal S1 and S2, and no murmur noted   Abdomen: Normal bowel sounds, soft, non-distended, non-tender   Skin: No rashes, no cyanosis.   Other: LE: Trace  edema.                Discharge Orders for Skilled Facility (from Discharge Orders):               Rehab orders for Skilled Facility (from Discharge Orders):               Discharge Time:      Greater than 30 minutes.        Image Results From This Hospital Stay (For Non-EPIC Providers):        Results for orders placed or performed during the hospital encounter of 04/02/17   CT Abdomen Pelvis w/o Contrast    Narrative    CT ABDOMEN PELVIS WITHOUT CONTRAST 4/2/2017 7:52 PM     HISTORY: Abdominal pain, RUQ.     COMPARISON: CT chest abdomen pelvis 11/1/2016.    TECHNIQUE: Axial images are obtained from the lung bases to the  symphysis without oral or IV contrast. Coronal reformatted images are  also generated.  Radiation dose for this scan was reduced using  automated exposure control, adjustment of the mA and/or kV  according  to patient size, or iterative reconstruction technique.    FINDINGS:     A small right and trace left pleural effusion are noted. These are new  since the prior exam. Lung base atelectasis and cardiomegaly are also  noted.    Abdomen: Calcified granulomas are noted in the spleen which is  otherwise normal in size. The liver, pancreas and adrenal glands are  unremarkable. Gallbladder is nondistended but the margins are slightly  ill-defined possibly indicating gallbladder wall inflammation. No  enlarged lymph nodes. Aorta is tortuous and calcified. No evidence of  aneurysm. The bowel is normal in caliber without obstruction. Appendix  is normal. Possible constipation but no evidence of diverticulitis.  The kidneys bilaterally demonstrate mild hydronephrosis and  hydroureter. Pelvis is obscured by artifact from bilateral hip  replacements therefore distally obstructing stones or prostate  enlargement is difficult to rule out. These findings are new since the  prior CT.    Pelvis: The majority of the pelvis is obscured by artifact from  bilateral hip replacements. Bladder is partially decompressed. Rectum  is grossly unremarkable. No significant free pelvic fluid or pelvic  lymph node enlargement. Degenerative spine changes are present.      Impression    IMPRESSION:  1. New bilateral pleural effusions, right larger than left and stable  cardiomegaly.  2. New bilateral hydronephrosis and hydroureter. Questionable enlarged  prostate gland. Distal ureters and bladder are obscured by artifact  from the hip replacements.  3. Possible constipation but no evidence of bowel obstruction or  diverticulitis. Appendix is normal.  4. Gallbladder is nondistended but the wall is slightly indistinct.  Follow-up ultrasound of the gallbladder if clinically warranted for  further assessment. No calcified gallstones are appreciated.     SANTY MATHEW MD   Chest XR,  PA & LAT    Narrative    CHEST TWO VIEWS  4/2/2017 7:00 PM      COMPARISON: Two view chest x-ray 1/30/2017.    HISTORY: CHF.       Impression    IMPRESSION: There are new small bilateral pleural effusions and new  minimal bibasilar airspace opacity that may represent atelectasis or  pneumonia. There is increased interstitial prominence in both lungs  that may represent early pulmonary edema. There is moderate  cardiomegaly.     EVETTE ROMERO MD   Abdomen US, limited (RUQ only)    Narrative    US ABDOMEN LIMITED 4/2/2017 9:16 PM     HISTORY: Abnormal appearing gallbladder on CT performed earlier today.      COMPARISON: CT abdomen pelvis 4/2/2017.    FINDINGS:  Liver is normal in echogenicity without focal lesions. The  gallbladder is normal without stones or sludge. Common bile duct is  normal in diameter. Pancreas is normal where visualized. Examination  of the right kidney demonstrates a cyst in the lower pole not  appreciated on the prior CT and measures 1.1 cm in diameter. Right  hydronephrosis is again noted. No obvious renal calculi. Incidental  right pleural effusion as described on prior CT.      Impression    IMPRESSION:    1. No evidence of gallstones or bile duct dilatation.  2. Right hydronephrosis and lower pole renal cyst as described. No  obvious renal calculi.     SANTY MATHEW MD           Most Recent Lab Results In EPIC (For Non-EPIC Providers):    Most Recent 3 CBC's:  Recent Labs   Lab Test  04/05/17   0615  04/03/17   0601  04/02/17   1824   WBC  11.0  7.9  9.4   HGB  9.1*  7.4*  7.5*   MCV  96  98  98   PLT  337  313  358      Most Recent 3 BMP's:  Recent Labs   Lab Test  04/04/17   1230  04/03/17   0601  04/02/17   1824   NA  139  140  140   POTASSIUM  3.5  3.8  4.7   CHLORIDE  100  101  104   CO2  32  30  28   BUN  24  24  23   CR  2.05*  1.99*  1.94*   ANIONGAP  7  9  8   TOR  8.8  8.3*  8.9   GLC  142*  98  118*     Most Recent 3 Troponin's:  Recent Labs   Lab Test  04/02/17   1824  05/29/15   0005  05/28/15   1825   TROPI  0.042  0.028  0.023      Most Recent 3 INR's:  Recent Labs   Lab Test  12/09/16   1030  08/17/16   1738  05/18/10   0547   INR  1.12  1.12  1.02     Most Recent 2 LFT's:  Recent Labs   Lab Test  04/03/17   0601  04/02/17   1824   AST  508*  940*   ALT  334*  381*   ALKPHOS  97  108   BILITOTAL  0.5  0.5     Most Recent Cholesterol Panel:  Recent Labs   Lab Test  06/15/15   1519   CHOL  130   LDL  59   HDL  49   TRIG  110     Most Recent 6 Bacteria Isolates From Any Culture (See EPIC Reports for Culture Details):  Recent Labs   Lab Test  02/14/17   1523  01/30/17   1616  01/30/17   1611  01/30/17   1505  01/02/17   2011  10/31/16   1300   CULT  No growth  No growth  >100,000 colonies/mL Proteus mirabilis*  No growth  >100,000 colonies/mL Proteus mirabilis*  <10,000 colonies/mL urogenital andi     Most Recent TSH, T4 and HgbA1c:  Recent Labs   Lab Test  04/04/17   1230  01/31/17   0855   03/18/14   1006   TSH  0.81   --    < >  0.07*   T4   --    --    --   1.62   A1C   --   6.8*   < >  6.2*    < > = values in this interval not displayed.

## 2017-04-06 NOTE — PROGRESS NOTES
Clinic Care Coordination Contact  Care Team Conversations    Patient has returned to [facility] with hospice care from [place].      Clinic care coordinator will follow up to monitor status as family still may decide to take patient home.     Shanna Mckay RN, CCM - Care Coordinator     4/6/2017    2:18 PM  [phone]

## 2017-04-06 NOTE — PROGRESS NOTES
SW:  D:  Received discharge orders for patient.  Patient is planning on returning home to [facility] upon discharge with [facility] today around 12:00.  Patient's son will transport, he will go to [facility] to get a portable oxygen tank for transport and then come to the hospital to transport patient around 12:00.  Patient and family informed of the plan and in agreement to the plan.  Call placed to update [facility] and faxed the orders.

## 2017-04-10 NOTE — TELEPHONE ENCOUNTER
Reason for Call:  Form, our goal is to have forms completed with 72 hours, however, some forms may require a visit or additional information.    Type of letter, form or note:  [facility]    Who is the form from?: [facility] (if other please explain)    Where did the form come from: form was faxed in    What clinic location was the form placed at?: [facility]    Where the form was placed: Dr's Box: Flip Prakash MD    What number is listed as a contact on the form?: [facility], fax # [phone]       Additional comments: [facility] Orders    Call taken on 4/10/2017 at 10:45 AM by JOSIAH LUIS

## 2017-04-12 NOTE — TELEPHONE ENCOUNTER
Reason for Call:  Form, our goal is to have forms completed with 72 hours, however, some forms may require a visit or additional information.    Type of letter, form or note:  [facility]    Who is the form from?: [facility] (if other please explain)    Where did the form come from: form was faxed in    What clinic location was the form placed at?: [facility][1]    Where the form was placed: Dr's Box: Flip Prakash MD    What number is listed as a contact on the form?: [facility], fax # [phone]       Additional comments: [facility] - Hospice Certification of Terminal Illness from 04- to 07-    Call taken on 4/12/2017 at 1:09 PM by JOSIAH LUIS

## 2017-04-12 NOTE — TELEPHONE ENCOUNTER
Form reviewed and signed by Dr. Flip Prakash and faxed to [facility].  Form routed to [facility] to be scanned.  Edie Singh TC

## 2017-04-15 NOTE — PROGRESS NOTES
[facility] Triage team will be sharing after hours issues and concerns about our patients through [employer].  If follow up or concerns are noted, please call back to the office and ask to speak to the RN Case Manager or Associate  for the patient.    [phone]    DATE OF FALL/ 04/15/2017  TIME OF FALL/ 0030  INJURIES/ none noted  HOW DID IT HAPPEN/ unwitnessed  INTERVENTIONS/ [facility]AL[facility] staff assisted patient back into bed without difficulty.  FALL PREVENTION INTERVENTION IMPLEMENTED/ senior care staff will check on patient at least hourly.  ALL APPROPRIATE DOCUMENTATION HAS BEEN COMPLETED/ yes  VULNERABLE ADULT DOCUMENTATION AND COMMUNICATION COMPLETED/ n/a  MD NOTIFIED BY/ via EPIC  FAMILY NOTIFIED BY/ [facility]ATL[facility] staff to notify family in the morning.    Thank You for your assistance in improving collaboration for our patients,  [facility] Triage Team  [phone]

## 2017-04-16 NOTE — PROGRESS NOTES
[facility] Triage team will be sharing after hours issues and concerns about our patients through [employer].  If follow up or concerns are noted, please call back to the office and ask to speak to the RN Case Manager or Associate  for the patient.    [phone]    DATE OF FALL/ 04/16/2017  TIME OF FALL/ 0530  INJURIES/ none noted  HOW DID IT HAPPEN/ During episode of terminal agitation and restlessness, patient slid off of his bed and landed on the floor in a seated position.  INTERVENTIONS/ HO visit nurse to assess if changes are needed to current interventions for terminal agitation   FALL PREVENTION INTERVENTION IMPLEMENTED/ hourly checks by facility staff  ALL APPROPRIATE DOCUMENTATION HAS BEEN COMPLETED/ yes  VULNERABLE ADULT DOCUMENTATION AND COMMUNICATION COMPLETED/ n/a  MD NOTIFIED BY/ EPIC message  FAMILY NOTIFIED BY/ Facility staff    Thank You for your assistance in improving collaboration for our patients,  [facility] Triage Team  [phone]

## 2017-04-17 NOTE — TELEPHONE ENCOUNTER
Form reviewed and signed by Dr. Flip Prakash and faxed to [facility].  Edie Singh TC

## 2017-04-17 NOTE — TELEPHONE ENCOUNTER
Reason for Call:  Form, our goal is to have forms completed with 72 hours, however, some forms may require a visit or additional information.    Type of letter, form or note:  [facility]    Who is the form from?: [facility] (if other please explain)    Where did the form come from: form was faxed in    What clinic location was the form placed at?: [facility]    Where the form was placed: Dr's Box: Flip Prakash MD    What number is listed as a contact on the form?: [facility], fax # [phone]       Additional comments: D/C all PO medications, except for comfort medications.  At the end of life and no longer swallowing per hospice orders.    Call taken on 4/17/2017 at 1:11 PM by JOSIAH LUIS

## 2017-04-18 ENCOUNTER — TELEPHONE (OUTPATIENT)
Dept: FAMILY MEDICINE | Facility: CLINIC | Age: 82
End: 2017-04-18

## 2017-04-18 ENCOUNTER — CARE COORDINATION (OUTPATIENT)
Dept: CARE COORDINATION | Facility: CLINIC | Age: 82
End: 2017-04-18

## 2017-04-18 NOTE — PROGRESS NOTES
Clinic Care Coordination Contact  Care Team Conversations    Per after hours message. Gavino from [facility] left message for clinic care coordinator, stating patient passed away at 6:30 pm on 4/17/17. Patient had been receiving hospice services.     PCP notified.     Closed to clinic care coordination.     Shanna Mckay RN, CCM - Care Coordinator     4/18/2017    7:47 AM  [phone]

## 2017-04-18 NOTE — TELEPHONE ENCOUNTER
Reason for Call:  Form, our goal is to have forms completed with 72 hours, however, some forms may require a visit or additional information.    Type of letter, form or note:  [facility]    Who is the form from?: [facility] (if other please explain)    Where did the form come from: form was faxed in    What clinic location was the form placed at?: [facility]    Where the form was placed: Dr's Box: Flip Prakash MD    What number is listed as a contact on the form?: [facility], fax # [phone]       Additional comments: [facility] - Death Notification - Patient  on 2017 @ 6:30 p.m.    Call taken on 2017 at 11:24 AM by JOSIAH LUIS

## 2017-04-24 ENCOUNTER — TELEPHONE (OUTPATIENT)
Dept: FAMILY MEDICINE | Facility: CLINIC | Age: 82
End: 2017-04-24

## 2017-04-24 NOTE — TELEPHONE ENCOUNTER
Reason for Call:  Form, our goal is to have forms completed with 72 hours, however, some forms may require a visit or additional information.    Type of letter, form or note:  [facility]    Who is the form from?: [facility] (if other please explain)    Where did the form come from: form was faxed in    What clinic location was the form placed at?: [facility]    Where the form was placed: Dr's Box: Arsh Durham MD    What number is listed as a contact on the form?: [facility] fax # [phone]       Additional comments: Home Care DC Summary    Call taken on 4/24/2017 at 2:26 PM by JOSIAH LUIS